# Patient Record
Sex: MALE | Race: WHITE | NOT HISPANIC OR LATINO | Employment: OTHER | URBAN - METROPOLITAN AREA
[De-identification: names, ages, dates, MRNs, and addresses within clinical notes are randomized per-mention and may not be internally consistent; named-entity substitution may affect disease eponyms.]

---

## 2017-01-13 RX ORDER — LIDOCAINE 50 MG/G
PATCH TOPICAL
Qty: 9 PATCH | Refills: 3 | Status: SHIPPED | OUTPATIENT
Start: 2017-01-13 | End: 2017-01-27 | Stop reason: SDUPTHER

## 2017-01-19 RX ORDER — TRIAMTERENE AND HYDROCHLOROTHIAZIDE 37.5; 25 MG/1; MG/1
TABLET ORAL
Qty: 30 TAB | Refills: 6 | Status: SHIPPED | OUTPATIENT
Start: 2017-01-19 | End: 2017-07-24 | Stop reason: SDUPTHER

## 2017-01-23 ENCOUNTER — TELEPHONE (OUTPATIENT)
Dept: MEDICAL GROUP | Facility: MEDICAL CENTER | Age: 62
End: 2017-01-23

## 2017-01-27 ENCOUNTER — OFFICE VISIT (OUTPATIENT)
Dept: MEDICAL GROUP | Facility: MEDICAL CENTER | Age: 62
End: 2017-01-27
Attending: FAMILY MEDICINE
Payer: MEDICAID

## 2017-01-27 VITALS
DIASTOLIC BLOOD PRESSURE: 80 MMHG | TEMPERATURE: 97.5 F | WEIGHT: 226 LBS | HEIGHT: 66 IN | BODY MASS INDEX: 36.32 KG/M2 | SYSTOLIC BLOOD PRESSURE: 130 MMHG | RESPIRATION RATE: 20 BRPM | HEART RATE: 88 BPM | OXYGEN SATURATION: 94 %

## 2017-01-27 DIAGNOSIS — M79.674 PAIN OF TOE OF RIGHT FOOT: ICD-10-CM

## 2017-01-27 DIAGNOSIS — M25.562 CHRONIC PAIN OF LEFT KNEE: ICD-10-CM

## 2017-01-27 DIAGNOSIS — N30.10 INTERSTITIAL CYSTITIS: ICD-10-CM

## 2017-01-27 DIAGNOSIS — I10 ESSENTIAL HYPERTENSION: ICD-10-CM

## 2017-01-27 DIAGNOSIS — G89.29 CHRONIC PAIN OF LEFT KNEE: ICD-10-CM

## 2017-01-27 PROCEDURE — 99212 OFFICE O/P EST SF 10 MIN: CPT | Performed by: FAMILY MEDICINE

## 2017-01-27 PROCEDURE — 99214 OFFICE O/P EST MOD 30 MIN: CPT | Performed by: FAMILY MEDICINE

## 2017-01-27 RX ORDER — HYDROCODONE BITARTRATE AND ACETAMINOPHEN 10; 325 MG/1; MG/1
1 TABLET ORAL EVERY 6 HOURS PRN
Qty: 120 TAB | Refills: 0 | Status: SHIPPED | OUTPATIENT
Start: 2017-01-27 | End: 2017-02-27 | Stop reason: SDUPTHER

## 2017-01-27 RX ORDER — LIDOCAINE 50 MG/G
PATCH TOPICAL
Qty: 30 PATCH | Refills: 3 | Status: SHIPPED | OUTPATIENT
Start: 2017-01-27 | End: 2018-12-28

## 2017-01-27 NOTE — PROGRESS NOTES
Subjective:      Allen Mccabe is a 61 y.o. male who presents with No chief complaint on file.            HPI 1. Interstitial cystitis-patient still continues to experience daily anterior low pelvic pain. He has been on Elmiron for about 7 months. He did consult with Dr. Orr who stated that if he takes Elmiron for a year without benefit it is likely not to work. Dr. Orr would like to do a updated cystoscopy which Allen is not consenting to because they tend to cause him significant pain for several days after the procedure and there is no active suspicion is he understands that of a new finding that would change therapy. Patient continues to take Norco 10 mg on average 4 times a day which is fairly effective. Intermittently he will add Dilaudid 4 mg 4 flareups of pain that often times are multiple doses just in several days and then the Dilaudid does not get added for days on end and he relies on the Norco alone. Not reporting current hematuria or any urinary incontinence.  2. Left knee pain-patient reports some injury to his left knee about 25 years ago which was improved with a intra-articular steroidal injection at that time. Reports over the last 1-1/2 years he's had intermittent sharp pains in certain positions in the area of his left kneecap. He is not reporting any locking, buckling, significant swelling.  3. Right toe pain-patient notes about a 5 year history of intermittent pain if he is reclining with his feet elevated and particular he pulls his right great toe back towards him. With standing or walking he is not having any significant pain. Has not noticed any localized redness or swelling in the area of the right great toe.  4. Hypertension-she states he didn't think his patient is compliant taking Dyazide 37.5/25, diltiazem 300 mg once daily, and terazosin 5 mg. Not reporting any chest pain or chest pressure. Reports home blood pressures typically running approximately 120s over 70s.    ROS  negative for nausea, diarrhea, rash, focal numbness   Social history-single, not working  Current medications-  Prior to Admission medications    Medication Sig Start Date End Date Taking? Authorizing Provider   hydrocodone/acetaminophen (NORCO)  MG Tab Take 1 Tab by mouth every 6 hours as needed. 1/27/17  Yes Michel Triana M.D.   lidocaine (LIDODERM) 5 % Patch APPLY 1 PATCH TO SKIN AS DIRECTED EVERY 24 HOURS. 1/27/17  Yes Michel Triana M.D.   triamterene-hctz (MAXZIDE-25/DYAZIDE) 37.5-25 MG Tab TAKE ONE TABLET BY MOUTH ONCE DAILY 1/19/17   Michel Triana M.D.   HYDROmorphone (DILAUDID) 4 MG Tab Take 1 Tab by mouth every 6 hours as needed. 12/29/16   Michel Triana M.D.   fluocinonide (LIDEX) 0.05 % Cream Apply 1 Application to affected area(s) 2 times a day. 12/5/16   Michel Triana M.D.   diazepam (VALIUM) 10 MG tablet 1.5 at HS PRN SLEEP & .5 in AM  for bladder spasms 11/29/16   Michel Triana M.D.   triamcinolone acetonide (KENALOG) 0.1 % Ointment Apply 1 Application to affected area(s) 2 times a day. 11/3/16   Michel Triana M.D.   Aug Betamethasone Dipropionate (DIPROLENE-AF) 0.05 % Cream Apply 1 g to affected area(s) 2 times a day. 10/25/16   Michel Triana M.D.   promethazine-codeine (PHENERGAN-CODEINE) 6.25-10 MG/5ML Syrup Take 5-10 mL by mouth 4 times a day as needed for Cough. 10/7/16   KENDALL Nair.P.RHEATH.   methocarbamol (ROBAXIN) 500 MG Tab Take 2 Tabs by mouth 3 times a day. 10/4/16   Michel Triana M.D.   betamethasone dipropionate (DIPROLENE) 0.05 % Ointment Apply thinly twice daily as needed 10/3/16   Michel Triana M.D.   pentosan (ELMIRON) 100 MG Cap Take 100 mg by mouth 3 times a day before meals.    Not In System Provider   naproxen (NAPROSYN) 500 MG Tab Take 1 Tab by mouth 2 times a day, with meals. 6/30/16   Michel Triana M.D.   cyclobenzaprine (FLEXERIL) 10 MG Tab Take 1 Tab by mouth 3 times a day as needed for Muscle Spasms. 6/29/16  "  Michel Triana M.D.   diltiazem CD (CARDIZEM CD) 300 MG CAPSULE SR 24 HR TAKE ONE CAPSULE BY MOUTH ONCE DAILY. 5/19/16   Michel Triana M.D.   terazosin (HYTRIN) 5 MG Cap Take 1 Cap by mouth every day. 4/21/16   Michel Triana M.D.   ranitidine (ZANTAC) 150 MG Tab Take 300 mg by mouth as needed for Heartburn.    Not In System Provider   diltiazem CD (CARTIA XT) 300 MG CAPSULE SR 24 HR Take 1 Cap by mouth every day. 9/25/15   Michel Triana M.D.   omeprazole (PRILOSEC) 20 MG delayed-release capsule Take 20 mg by mouth 2 times a day.    Not In System Provider          Objective:     /80 mmHg  Pulse 88  Temp(Src) 36.4 °C (97.5 °F)  Resp 20  Ht 1.676 m (5' 5.98\")  Wt 102.513 kg (226 lb)  BMI 36.49 kg/m2  SpO2 94%     Physical Exam      Gen.- alert, cooperative, in no acute distress  Neck- midline trachea, thyroid not enlarged or tender,supple, no cervical adenopathy  Chest-clear to auscultation and percussion with normal breath sounds. No retractions. Chest wall nontender  Cardiac- regular rhythm and rate. No murmur, thrill, or heave  Back-1+ tender from L5-S4 in the midline and the upper parasacral areas bilaterally.  Left knee-negative for effusion or warmth. No mediolateral instability. Anterior drawer is negative.  Right foot-intact light touch. No redness warmth or deformity. Patient expresses tenderness in the first MTP-DIP region of the great toe particular as that area is put into active extension.       Assessment/Plan:     1. Interstitial cystitis      2. Essential hypertension      3. Chronic pain of left knee      4. Pain of toe of right foot    Plan: 1. UDS today  2. Renew Norco 10 mg, #120 today  3. X-rays of the right toe and left knee  4. Patient could consider taking half doses of Robaxin 500 mg for more severe episodes of neck tightness    Plan: 1. Plain x-rays of the right great toe and left knee  2. Refill Norco 10 mg, #120  3. Revisit in 3 months or sooner if " needed  4. During acute flares of interstitial pain or back pain patient may continue to take his daily Norco along with supplemental doses of Dilaudid 4 mg.

## 2017-01-27 NOTE — MR AVS SNAPSHOT
"        Allen Mccabe   2017 8:30 AM   Office Visit   MRN: [id number]    [facility]   Dept Phone:  [phone]    Description:  Male : 1955   Provider:  Michel Triana M.D.           Reason for Visit     Hypertension           Allergies as of 2017     Allergen Noted Reactions    Nkda [No Known Drug Allergy] 10/04/2010       Zofran [Dextrose-Ondansetron] 2015   Rash    Diffuse rash      You were diagnosed with     Interstitial cystitis   [110961]       Essential hypertension   [8896852]       Chronic pain of left knee   [694612]       Pain of toe of right foot   [070331]         Vital Signs     Blood Pressure Pulse Temperature Respirations Height Weight    130/80 mmHg 88 36.4 °C (97.5 °F) 20 1.676 m (5' 5.98\") 102.513 kg (226 lb)    Body Mass Index Oxygen Saturation Smoking Status             36.49 kg/m2 94% Never Smoker          Basic Information     Date Of Birth Sex Race Ethnicity Preferred Language    1955 Male White Non- English      Problem List              ICD-10-CM Priority Class Noted - Resolved    Interstitial cystitis N30.10   Unknown - Present    Headache R51   Unknown - Present    Sprain of neck S13.9XXA   Unknown - Present    Chronic pain syndrome G89.4   6/15/2009 - Present    Hyperlipidemia E78.5   2010 - Present    Hypertension I10   2010 - Present    Obesity E66.9   2014 - Present    Skin lesion of back L98.9   2014 - Present    Recurrent cough R05   2015 - Present    Paresthesia of right leg R20.2   2015 - Present    Chronic pharyngitis J31.2   2015 - Present    Esophageal reflux K21.9   2015 - Present    Dysphagia R13.10   2015 - Present    Thoracic myofascial strain S29.019A   2015 - Present    Rash R21   2015 - Present    Drug dermatitis L27.0   2015 - Present    Rectal bleeding K62.5   2015 - Present    Accessory skin tags Q82.8   10/2/2015 - Present    Lumbar muscle " pain M54.5   10/27/2015 - Present    Multiple skin nodules R22.9   10/27/2015 - Present    Chronic lumbar pain M54.5, G89.29   12/24/2015 - Present    Dermatitis L30.9   12/24/2015 - Present    Neck pain of over 3 months duration M54.2, G89.29   1/21/2016 - Present    Skin lesion L98.9   2/12/2016 - Present    Obesity (BMI 30-39.9) E66.9   10/3/2016 - Present      Health Maintenance        Date Due Completion Dates    IMM ZOSTER VACCINE 3/7/2016 ---    IMM INFLUENZA (1) 9/1/2016 ---    COLONOSCOPY 1/5/2025 1/5/2015    IMM DTaP/Tdap/Td Vaccine (2 - Td) 2/17/2025 2/17/2015            Current Immunizations     Tdap Vaccine 2/17/2015      Below and/or attached are the medications your provider expects you to take. Review all of your home medications and newly ordered medications with your provider and/or pharmacist. Follow medication instructions as directed by your provider and/or pharmacist. Please keep your medication list with you and share with your provider. Update the information when medications are discontinued, doses are changed, or new medications (including over-the-counter products) are added; and carry medication information at all times in the event of emergency situations     Allergies:  NKDA - (reactions not documented)     ZOFRAN - Rash               Medications  Valid as of: January 27, 2017 - 10:23 AM    Generic Name Brand Name Tablet Size Instructions for use    Betamethasone Dipropionate (Ointment) DIPROLENE 0.05 % Apply thinly twice daily as needed        Betamethasone Dipropionate Aug (Cream) DIPROLENE-AF 0.05 % Apply 1 g to affected area(s) 2 times a day.        Cyclobenzaprine HCl (Tab) FLEXERIL 10 MG Take 1 Tab by mouth 3 times a day as needed for Muscle Spasms.        DiazePAM (Tab) VALIUM 10 MG 1.5 at HS PRN SLEEP & .5 in AM  for bladder spasms        DiltiaZEM HCl Coated Beads (CAPSULE SR 24 HR) CARDIZEM  MG Take 1 Cap by mouth every day.        DiltiaZEM HCl Coated Beads (CAPSULE SR  24 HR) CARDIZEM  MG TAKE ONE CAPSULE BY MOUTH ONCE DAILY.        Fluocinonide (Cream) LIDEX 0.05 % Apply 1 Application to affected area(s) 2 times a day.        Hydrocodone-Acetaminophen (Tab) NORCO  MG Take 1 Tab by mouth every 6 hours as needed.        HYDROmorphone HCl (Tab) DILAUDID 4 MG Take 1 Tab by mouth every 6 hours as needed.        Lidocaine (Patch) LIDODERM 5 % APPLY 1 PATCH TO SKIN AS DIRECTED EVERY 24 HOURS.        Methocarbamol (Tab) ROBAXIN 500 MG Take 2 Tabs by mouth 3 times a day.        Naproxen (Tab) NAPROSYN 500 MG Take 1 Tab by mouth 2 times a day, with meals.        Omeprazole (CAPSULE DELAYED RELEASE) PRILOSEC 20 MG Take 20 mg by mouth 2 times a day.        Pentosan Polysulfate Sodium (Cap) ELMIRON 100 MG Take 100 mg by mouth 3 times a day before meals.        Promethazine-Codeine (Syrup) PHENERGAN-CODEINE 6.25-10 MG/5ML Take 5-10 mL by mouth 4 times a day as needed for Cough.        RaNITidine HCl (Tab) ZANTAC 150 MG Take 300 mg by mouth as needed for Heartburn.        Terazosin HCl (Cap) HYTRIN 5 MG Take 1 Cap by mouth every day.        Triamcinolone Acetonide (Ointment) KENALOG 0.1 % Apply 1 Application to affected area(s) 2 times a day.        Triamterene-HCTZ (Tab) MAXZIDE-25/DYAZIDE 37.5-25 MG TAKE ONE TABLET BY MOUTH ONCE DAILY        .                 Medicines prescribed today were sent to:     [facility] - [address] [address]    Phone: [phone] Fax: [phone]    Open 24 Hours?: No      Medication refill instructions:       If your prescription bottle indicates you have medication refills left, it is not necessary to call your provider’s office. Please contact your pharmacy and they will refill your medication.    If your prescription bottle indicates you do not have any refills left, you may request refills at any time through one of the following ways: The online [employer] system (except Urgent Care), by calling your  provider’s office, or by asking your pharmacy to contact your provider’s office with a refill request. Medication refills are processed only during regular business hours and may not be available until the next business day. Your provider may request additional information or to have a follow-up visit with you prior to refilling your medication.   *Please Note: Medication refills are assigned a new Rx number when refilled electronically. Your pharmacy may indicate that no refills were authorized even though a new prescription for the same medication is available at the pharmacy. Please request the medicine by name with the pharmacy before contacting your provider for a refill.        Your To Do List     Future Labs/Procedures Complete By Expires    DX-KNEE COMPLETE 4+ LEFT  As directed 1/27/2018    DX-TOE(S) 2+ RIGHT  As directed 7/30/2017      Other Notes About Your Plan     Fall risk done 6/25/15           MyChart Access Code: Activation code not generated  Current [employer] Access Status: Active

## 2017-02-06 ENCOUNTER — TELEPHONE (OUTPATIENT)
Dept: MEDICAL GROUP | Facility: MEDICAL CENTER | Age: 62
End: 2017-02-06

## 2017-02-06 RX ORDER — BETAMETHASONE DIPROPIONATE 0.5 MG/G
1 OINTMENT, AUGMENTED TOPICAL 2 TIMES DAILY
Qty: 30 G | Refills: 3 | Status: SHIPPED | OUTPATIENT
Start: 2017-02-06 | End: 2018-12-28

## 2017-02-15 ENCOUNTER — HOSPITAL ENCOUNTER (OUTPATIENT)
Dept: RADIOLOGY | Facility: MEDICAL CENTER | Age: 62
End: 2017-02-15
Attending: FAMILY MEDICINE
Payer: MEDICAID

## 2017-02-15 DIAGNOSIS — M79.674 PAIN OF TOE OF RIGHT FOOT: ICD-10-CM

## 2017-02-15 DIAGNOSIS — M25.562 CHRONIC PAIN OF LEFT KNEE: ICD-10-CM

## 2017-02-15 DIAGNOSIS — G89.29 CHRONIC PAIN OF LEFT KNEE: ICD-10-CM

## 2017-02-15 PROCEDURE — 73660 X-RAY EXAM OF TOE(S): CPT | Mod: RT

## 2017-02-15 PROCEDURE — 73564 X-RAY EXAM KNEE 4 OR MORE: CPT | Mod: LT

## 2017-02-21 ENCOUNTER — TELEPHONE (OUTPATIENT)
Dept: MEDICAL GROUP | Facility: MEDICAL CENTER | Age: 62
End: 2017-02-21

## 2017-02-27 ENCOUNTER — OFFICE VISIT (OUTPATIENT)
Dept: MEDICAL GROUP | Facility: MEDICAL CENTER | Age: 62
End: 2017-02-27
Attending: FAMILY MEDICINE
Payer: MEDICAID

## 2017-02-27 VITALS
TEMPERATURE: 97.5 F | RESPIRATION RATE: 16 BRPM | SYSTOLIC BLOOD PRESSURE: 128 MMHG | BODY MASS INDEX: 36.64 KG/M2 | OXYGEN SATURATION: 93 % | WEIGHT: 228 LBS | HEIGHT: 66 IN | DIASTOLIC BLOOD PRESSURE: 70 MMHG | HEART RATE: 96 BPM

## 2017-02-27 DIAGNOSIS — N30.10 INTERSTITIAL CYSTITIS: ICD-10-CM

## 2017-02-27 DIAGNOSIS — M19.071 OSTEOARTHRITIS OF ANKLE OR FOOT, RIGHT: ICD-10-CM

## 2017-02-27 PROCEDURE — 99213 OFFICE O/P EST LOW 20 MIN: CPT | Performed by: FAMILY MEDICINE

## 2017-02-27 PROCEDURE — 99212 OFFICE O/P EST SF 10 MIN: CPT | Performed by: FAMILY MEDICINE

## 2017-02-27 RX ORDER — HYDROCODONE BITARTRATE AND ACETAMINOPHEN 10; 325 MG/1; MG/1
1 TABLET ORAL EVERY 6 HOURS PRN
Qty: 120 TAB | Refills: 0 | Status: SHIPPED | OUTPATIENT
Start: 2017-02-27 | End: 2017-03-27 | Stop reason: SDUPTHER

## 2017-02-27 RX ORDER — MELOXICAM 15 MG/1
15 TABLET ORAL DAILY
Qty: 30 TAB | Refills: 6 | Status: SHIPPED | OUTPATIENT
Start: 2017-02-27 | End: 2018-01-18

## 2017-02-27 ASSESSMENT — PAIN SCALES - GENERAL: PAINLEVEL: 5=MODERATE PAIN

## 2017-02-27 NOTE — PROGRESS NOTES
"Subjective:      Allen Mccabe is a 61 y.o. male who presents with Results and Toe Pain            HPI Right Great Toe Pain-patient reports ongoing daily level of pain at the base of the right great toe at the first MTP joint. This has been gradually becoming more consistent over the past 5 years. Patient did have a approximately 30-35 year history of recreational running along with athletic running track in high school including some sprinting. Patient notes no significant swelling or discoloration. Most severe pain is noted when patient is actually off of his feet and either flexes or extends his right great toe.    ROS negative for current black or bloody stools, focal numbness, ankle edema       Objective:     /70 mmHg  Pulse 96  Temp(Src) 36.4 °C (97.5 °F)  Resp 16  Ht 1.676 m (5' 5.98\")  Wt 103.42 kg (228 lb)  BMI 36.82 kg/m2  SpO2 93%     Physical Exam  Gen.-alert cooperative male in no acute distress  Right lower extremity-intact light touch. 1+ tender to palpation over the first MTP joint along with a moderate bunion developing at that site.  X-ray-notable for severe degenerative change with loss of joint space and subchondral sclerosis at the first MTP joint          Assessment/Plan:     1. Interstitial cystitis    2. Osteoarthritis right foot       Plan: 1. Trial of meloxicam 15 mg with dinner (suspected patient may be having a drowsiness side effect with previous use of naproxen)  2. Norco 30 day renewal done  "

## 2017-02-27 NOTE — MR AVS SNAPSHOT
"        Allen Mccabe   2017 8:30 AM   Office Visit   MRN: [id number]    [facility]   Dept Phone:  [phone]    Description:  Male : 1955   Provider:  Michel Triana M.D.           Reason for Visit     Results toe x ray results    Toe Pain           Allergies as of 2017     Allergen Noted Reactions    Nkda [No Known Drug Allergy] 10/04/2010       Zofran [Dextrose-Ondansetron] 2015   Rash    Diffuse rash      You were diagnosed with     Interstitial cystitis   [101590]       Osteoarthritis of ankle or foot, right   [735742]         Vital Signs     Blood Pressure Pulse Temperature Respirations Height Weight    128/70 mmHg 96 36.4 °C (97.5 °F) 16 1.676 m (5' 5.98\") 103.42 kg (228 lb)    Body Mass Index Oxygen Saturation Smoking Status             36.82 kg/m2 93% Never Smoker          Basic Information     Date Of Birth Sex Race Ethnicity Preferred Language    1955 Male White Non- English      Problem List              ICD-10-CM Priority Class Noted - Resolved    Interstitial cystitis N30.10   Unknown - Present    Headache R51   Unknown - Present    Sprain of neck S13.9XXA   Unknown - Present    Chronic pain syndrome G89.4   6/15/2009 - Present    Hyperlipidemia E78.5   2010 - Present    Hypertension I10   2010 - Present    Obesity E66.9   2014 - Present    Skin lesion of back L98.9   2014 - Present    Recurrent cough R05   2015 - Present    Paresthesia of right leg R20.2   2015 - Present    Chronic pharyngitis J31.2   2015 - Present    Esophageal reflux K21.9   2015 - Present    Dysphagia R13.10   2015 - Present    Thoracic myofascial strain S29.019A   2015 - Present    Rash R21   2015 - Present    Drug dermatitis L27.0   2015 - Present    Rectal bleeding K62.5   2015 - Present    Accessory skin tags Q82.8   10/2/2015 - Present    Lumbar muscle pain M54.5   10/27/2015 - Present    Multiple skin " nodules R22.9   10/27/2015 - Present    Chronic lumbar pain M54.5, G89.29   12/24/2015 - Present    Dermatitis L30.9   12/24/2015 - Present    Neck pain of over 3 months duration M54.2, G89.29   1/21/2016 - Present    Skin lesion L98.9   2/12/2016 - Present    Obesity (BMI 30-39.9) E66.9   10/3/2016 - Present      Health Maintenance        Date Due Completion Dates    IMM ZOSTER VACCINE 3/7/2016 ---    IMM INFLUENZA (1) 9/1/2016 ---    COLONOSCOPY 1/5/2025 1/5/2015    IMM DTaP/Tdap/Td Vaccine (2 - Td) 2/17/2025 2/17/2015            Current Immunizations     Tdap Vaccine 2/17/2015      Below and/or attached are the medications your provider expects you to take. Review all of your home medications and newly ordered medications with your provider and/or pharmacist. Follow medication instructions as directed by your provider and/or pharmacist. Please keep your medication list with you and share with your provider. Update the information when medications are discontinued, doses are changed, or new medications (including over-the-counter products) are added; and carry medication information at all times in the event of emergency situations     Allergies:  NKDA - (reactions not documented)     ZOFRAN - Rash               Medications  Valid as of: February 27, 2017 -  9:20 AM    Generic Name Brand Name Tablet Size Instructions for use    Betamethasone Dipropionate (Ointment) DIPROLENE 0.05 % Apply thinly twice daily as needed        Betamethasone Dipropionate Aug (Cream) DIPROLENE-AF 0.05 % Apply 1 g to affected area(s) 2 times a day.        Betamethasone Dipropionate Aug (Ointment) DIPROLENE-AF 0.05 % Apply 1 Application to affected area(s) 2 times a day.        Cyclobenzaprine HCl (Tab) FLEXERIL 10 MG Take 1 Tab by mouth 3 times a day as needed for Muscle Spasms.        DiazePAM (Tab) VALIUM 10 MG 1.5 at HS PRN SLEEP & .5 in AM  for bladder spasms        DiltiaZEM HCl Coated Beads (CAPSULE SR 24 HR) CARDIZEM  MG  Take 1 Cap by mouth every day.        DiltiaZEM HCl Coated Beads (CAPSULE SR 24 HR) CARDIZEM  MG TAKE ONE CAPSULE BY MOUTH ONCE DAILY.        Fluocinonide (Cream) LIDEX 0.05 % Apply 1 Application to affected area(s) 2 times a day.        Hydrocodone-Acetaminophen (Tab) NORCO  MG Take 1 Tab by mouth every 6 hours as needed.        HYDROmorphone HCl (Tab) DILAUDID 4 MG Take 1 Tab by mouth every 6 hours as needed.        Lidocaine (Patch) LIDODERM 5 % APPLY 1 PATCH TO SKIN AS DIRECTED EVERY 24 HOURS.        Meloxicam (Tab) MOBIC 15 MG Take 1 Tab by mouth every day.        Methocarbamol (Tab) ROBAXIN 500 MG Take 2 Tabs by mouth 3 times a day.        Naproxen (Tab) NAPROSYN 500 MG Take 1 Tab by mouth 2 times a day, with meals.        Omeprazole (CAPSULE DELAYED RELEASE) PRILOSEC 20 MG Take 20 mg by mouth 2 times a day.        Pentosan Polysulfate Sodium (Cap) ELMIRON 100 MG Take 100 mg by mouth 3 times a day before meals.        Promethazine-Codeine (Syrup) PHENERGAN-CODEINE 6.25-10 MG/5ML Take 5-10 mL by mouth 4 times a day as needed for Cough.        RaNITidine HCl (Tab) ZANTAC 150 MG Take 300 mg by mouth as needed for Heartburn.        Terazosin HCl (Cap) HYTRIN 5 MG Take 1 Cap by mouth every day.        Triamcinolone Acetonide (Ointment) KENALOG 0.1 % Apply 1 Application to affected area(s) 2 times a day.        Triamterene-HCTZ (Tab) MAXZIDE-25/DYAZIDE 37.5-25 MG TAKE ONE TABLET BY MOUTH ONCE DAILY        .                 Medicines prescribed today were sent to:     [facility] - [address] [address]    Phone: [phone] Fax: [phone]    Open 24 Hours?: No      Medication refill instructions:       If your prescription bottle indicates you have medication refills left, it is not necessary to call your provider’s office. Please contact your pharmacy and they will refill your medication.    If your prescription bottle indicates you do not have any refills  left, you may request refills at any time through one of the following ways: The online [employer] system (except Urgent Care), by calling your provider’s office, or by asking your pharmacy to contact your provider’s office with a refill request. Medication refills are processed only during regular business hours and may not be available until the next business day. Your provider may request additional information or to have a follow-up visit with you prior to refilling your medication.   *Please Note: Medication refills are assigned a new Rx number when refilled electronically. Your pharmacy may indicate that no refills were authorized even though a new prescription for the same medication is available at the pharmacy. Please request the medicine by name with the pharmacy before contacting your provider for a refill.        Other Notes About Your Plan     Fall risk done 6/25/15           [employer] Access Code: Activation code not generated  Current [employer] Status: Active

## 2017-03-23 ENCOUNTER — PATIENT MESSAGE (OUTPATIENT)
Dept: MEDICAL GROUP | Facility: MEDICAL CENTER | Age: 62
End: 2017-03-23

## 2017-03-27 DIAGNOSIS — L30.9 DERMATITIS: ICD-10-CM

## 2017-03-27 DIAGNOSIS — N30.10 INTERSTITIAL CYSTITIS: ICD-10-CM

## 2017-03-27 RX ORDER — HYDROCODONE BITARTRATE AND ACETAMINOPHEN 10; 325 MG/1; MG/1
1 TABLET ORAL EVERY 6 HOURS PRN
Qty: 120 TAB | Refills: 0 | Status: SHIPPED | OUTPATIENT
Start: 2017-03-27 | End: 2017-04-27 | Stop reason: SDUPTHER

## 2017-03-27 RX ORDER — BETAMETHASONE DIPROPIONATE 0.05 %
OINTMENT (GRAM) TOPICAL
Qty: 30 G | Refills: 1 | Status: SHIPPED | OUTPATIENT
Start: 2017-03-27 | End: 2017-08-04 | Stop reason: SDUPTHER

## 2017-03-31 DIAGNOSIS — S29.019A ACUTE THORACIC MYOFASCIAL STRAIN, INITIAL ENCOUNTER: ICD-10-CM

## 2017-04-03 RX ORDER — METHOCARBAMOL 500 MG/1
1000 TABLET, FILM COATED ORAL 3 TIMES DAILY
Qty: 120 TAB | Refills: 11 | Status: SHIPPED | OUTPATIENT
Start: 2017-04-03 | End: 2018-12-28

## 2017-04-06 DIAGNOSIS — R10.2 PELVIC PAIN: ICD-10-CM

## 2017-04-06 RX ORDER — HYDROMORPHONE HYDROCHLORIDE 4 MG/1
4 TABLET ORAL EVERY 6 HOURS PRN
Qty: 45 TAB | Refills: 0 | Status: CANCELLED | OUTPATIENT
Start: 2017-04-06

## 2017-04-07 DIAGNOSIS — R10.2 PELVIC PAIN: ICD-10-CM

## 2017-04-07 RX ORDER — HYDROMORPHONE HYDROCHLORIDE 4 MG/1
4 TABLET ORAL EVERY 6 HOURS PRN
Qty: 45 TAB | Refills: 0 | Status: CANCELLED | OUTPATIENT
Start: 2017-04-07

## 2017-04-07 RX ORDER — HYDROMORPHONE HYDROCHLORIDE 4 MG/1
4 TABLET ORAL EVERY 6 HOURS PRN
Qty: 45 TAB | Refills: 0 | Status: SHIPPED | OUTPATIENT
Start: 2017-04-07 | End: 2017-07-24 | Stop reason: SDUPTHER

## 2017-04-10 ENCOUNTER — OFFICE VISIT (OUTPATIENT)
Dept: MEDICAL GROUP | Facility: MEDICAL CENTER | Age: 62
End: 2017-04-10
Attending: FAMILY MEDICINE
Payer: MEDICAID

## 2017-04-10 VITALS
RESPIRATION RATE: 16 BRPM | WEIGHT: 230 LBS | TEMPERATURE: 97.9 F | HEIGHT: 66 IN | HEART RATE: 88 BPM | OXYGEN SATURATION: 94 % | BODY MASS INDEX: 36.96 KG/M2 | SYSTOLIC BLOOD PRESSURE: 122 MMHG | DIASTOLIC BLOOD PRESSURE: 76 MMHG

## 2017-04-10 DIAGNOSIS — J11.1 FLU: ICD-10-CM

## 2017-04-10 DIAGNOSIS — E66.9 OBESITY (BMI 30-39.9): ICD-10-CM

## 2017-04-10 PROCEDURE — 99212 OFFICE O/P EST SF 10 MIN: CPT | Performed by: FAMILY MEDICINE

## 2017-04-10 PROCEDURE — 99213 OFFICE O/P EST LOW 20 MIN: CPT | Performed by: FAMILY MEDICINE

## 2017-04-10 RX ORDER — PROMETHAZINE HYDROCHLORIDE AND CODEINE PHOSPHATE 6.25; 1 MG/5ML; MG/5ML
5-10 SYRUP ORAL 4 TIMES DAILY PRN
Qty: 240 ML | Refills: 0 | Status: SHIPPED | OUTPATIENT
Start: 2017-04-10 | End: 2017-04-17 | Stop reason: SDUPTHER

## 2017-04-10 ASSESSMENT — PAIN SCALES - GENERAL: PAINLEVEL: 5=MODERATE PAIN

## 2017-04-10 NOTE — MR AVS SNAPSHOT
"        Allen Mccabe   4/10/2017 2:30 PM   Office Visit   MRN: [id number]    [facility]   Dept Phone:  [phone]    Description:  Male : 1955   Provider:  Michel Triana M.D.           Reason for Visit     Influenza           Allergies as of 4/10/2017     Allergen Noted Reactions    Nkda [No Known Drug Allergy] 10/04/2010       Zofran [Dextrose-Ondansetron] 2015   Rash    Diffuse rash      You were diagnosed with     Flu   [599250]       Obesity (BMI 30-39.9)   [463491]         Vital Signs     Blood Pressure Pulse Temperature Respirations Height Weight    122/76 mmHg 88 36.6 °C (97.9 °F) 16 1.676 m (5' 5.98\") 104.327 kg (230 lb)    Body Mass Index Oxygen Saturation Smoking Status             37.14 kg/m2 94% Never Smoker          Basic Information     Date Of Birth Sex Race Ethnicity Preferred Language    1955 Male White Non- English      Your appointments     2017  8:30 AM   Established Patient with Michel Triana M.D.   [facility] ([facility])    [address] [place] [id number]   [phone]           You will be receiving a confirmation call a few days before your appointment from our automated call confirmation system.              Problem List              ICD-10-CM Priority Class Noted - Resolved    Interstitial cystitis N30.10   Unknown - Present    Headache R51   Unknown - Present    Sprain of neck S13.9XXA   Unknown - Present    Chronic pain syndrome G89.4   6/15/2009 - Present    Hyperlipidemia E78.5   2010 - Present    Hypertension I10   2010 - Present    Obesity E66.9   2014 - Present    Skin lesion of back L98.9   2014 - Present    Recurrent cough R05   2015 - Present    Paresthesia of right leg R20.2   2015 - Present    Chronic pharyngitis J31.2   2015 - Present    Esophageal reflux K21.9   2015 - Present    Dysphagia R13.10   2015 - Present    Thoracic myofascial strain " S29.019A   4/28/2015 - Present    Rash R21   5/12/2015 - Present    Drug dermatitis L27.0   5/18/2015 - Present    Rectal bleeding K62.5   6/25/2015 - Present    Accessory skin tags Q82.8   10/2/2015 - Present    Lumbar muscle pain M54.5   10/27/2015 - Present    Multiple skin nodules R22.9   10/27/2015 - Present    Chronic lumbar pain M54.5, G89.29   12/24/2015 - Present    Dermatitis L30.9   12/24/2015 - Present    Neck pain of over 3 months duration M54.2, G89.29   1/21/2016 - Present    Skin lesion L98.9   2/12/2016 - Present    Obesity (BMI 30-39.9) E66.9   10/3/2016 - Present      Health Maintenance        Date Due Completion Dates    IMM ZOSTER VACCINE 3/7/2016 ---    COLONOSCOPY 1/5/2025 1/5/2015    IMM DTaP/Tdap/Td Vaccine (2 - Td) 2/17/2025 2/17/2015            Current Immunizations     Tdap Vaccine 2/17/2015      Below and/or attached are the medications your provider expects you to take. Review all of your home medications and newly ordered medications with your provider and/or pharmacist. Follow medication instructions as directed by your provider and/or pharmacist. Please keep your medication list with you and share with your provider. Update the information when medications are discontinued, doses are changed, or new medications (including over-the-counter products) are added; and carry medication information at all times in the event of emergency situations     Allergies:  NKDA - (reactions not documented)     ZOFRAN - Rash               Medications  Valid as of: April 10, 2017 -  3:15 PM    Generic Name Brand Name Tablet Size Instructions for use    Betamethasone Dipropionate (Ointment) DIPROLENE 0.05 % Apply thinly twice daily as needed        Betamethasone Dipropionate Aug (Cream) DIPROLENE-AF 0.05 % Apply 1 g to affected area(s) 2 times a day.        Betamethasone Dipropionate Aug (Ointment) DIPROLENE-AF 0.05 % Apply 1 Application to affected area(s) 2 times a day.        Clobetasol Propionate  (Ointment) TEMOVATE 0.05 % Apply 1 Application to affected area(s) 2 times a day.        Cyclobenzaprine HCl (Tab) FLEXERIL 10 MG Take 1 Tab by mouth 3 times a day as needed for Muscle Spasms.        DiazePAM (Tab) VALIUM 10 MG 1.5 at HS PRN SLEEP & .5 in AM  for bladder spasms        DiltiaZEM HCl Coated Beads (CAPSULE SR 24 HR) CARDIZEM  MG Take 1 Cap by mouth every day.        DiltiaZEM HCl Coated Beads (CAPSULE SR 24 HR) CARDIZEM  MG TAKE ONE CAPSULE BY MOUTH ONCE DAILY.        Fluocinonide (Cream) LIDEX 0.05 % Apply 1 Application to affected area(s) 2 times a day.        Hydrocodone-Acetaminophen (Tab) NORCO  MG Take 1 Tab by mouth every 6 hours as needed.        HYDROmorphone HCl (Tab) DILAUDID 4 MG Take 1 Tab by mouth every 6 hours as needed.        Lidocaine (Patch) LIDODERM 5 % APPLY 1 PATCH TO SKIN AS DIRECTED EVERY 24 HOURS.        Meloxicam (Tab) MOBIC 15 MG Take 1 Tab by mouth every day.        Methocarbamol (Tab) ROBAXIN 500 MG Take 2 Tabs by mouth 3 times a day.        Naproxen (Tab) NAPROSYN 500 MG Take 1 Tab by mouth 2 times a day, with meals.        Omeprazole (CAPSULE DELAYED RELEASE) PRILOSEC 20 MG Take 20 mg by mouth 2 times a day.        Pentosan Polysulfate Sodium (Cap) ELMIRON 100 MG Take 100 mg by mouth 3 times a day before meals.        Promethazine-Codeine (Syrup) PHENERGAN-CODEINE 6.25-10 MG/5ML Take 5-10 mL by mouth 4 times a day as needed for Cough.        RaNITidine HCl (Tab) ZANTAC 150 MG Take 300 mg by mouth as needed for Heartburn.        Terazosin HCl (Cap) HYTRIN 5 MG Take 1 Cap by mouth every day.        Triamcinolone Acetonide (Ointment) KENALOG 0.1 % Apply 1 Application to affected area(s) 2 times a day.        Triamterene-HCTZ (Tab) MAXZIDE-25/DYAZIDE 37.5-25 MG TAKE ONE TABLET BY MOUTH ONCE DAILY        .                 Medicines prescribed today were sent to:     [facility] - [place], [place] [address] [address]    Phone:  [phone] Fax: [phone]    Open 24 Hours?: No      Medication refill instructions:       If your prescription bottle indicates you have medication refills left, it is not necessary to call your provider’s office. Please contact your pharmacy and they will refill your medication.    If your prescription bottle indicates you do not have any refills left, you may request refills at any time through one of the following ways: The online "[employer] system (except Urgent Care), by calling your provider’s office, or by asking your pharmacy to contact your provider’s office with a refill request. Medication refills are processed only during regular business hours and may not be available until the next business day. Your provider may request additional information or to have a follow-up visit with you prior to refilling your medication.   *Please Note: Medication refills are assigned a new Rx number when refilled electronically. Your pharmacy may indicate that no refills were authorized even though a new prescription for the same medication is available at the pharmacy. Please request the medicine by name with the pharmacy before contacting your provider for a refill.        Other Notes About Your Plan     Fall risk done 6/25/15           "[employer] Access Code: Activation code not generated  Current "[employer] Status: Active

## 2017-04-10 NOTE — PROGRESS NOTES
"Subjective:      Allen Mccabe is a 62 y.o. male who presents with No chief complaint on file.            HPI 1. Flu illness-patient reports onset 5 days ago of initial lightheadedness with diffuse joint aches and bone aches. Denies much in the way of sore muscles. Over the past 3 days has developed a deep uncomfortable but mostly nonproductive cough. Felt quite warm to nights ago but was unable to measure his temperature      ROS negative for diarrhea, diaphoresis, rash, dyspnea       Objective:     /76 mmHg  Pulse 88  Temp(Src) 36.6 °C (97.9 °F)  Resp 16  Ht 1.676 m (5' 5.98\")  Wt 104.327 kg (230 lb)  BMI 37.14 kg/m2  SpO2 94%     Physical Exam  General- alert,cooperative patient in no acute distress  Ears- normal tms without redness, perforation. Canals unremarkable  Nares- clear, pink, moist mucosa without bleeding. No purulent nasal DC  Orophx.- lips normal. Clear, pink, moist mucosa without redness or exudate. Tongue is midline  Chest-Normal to auscultation and percussion, movement is symmetric. Nontender to palpation.   Skin-Skin is clear without rash, edema, redness, or cyanosis.              Assessment/Plan:     1. Flu      2. Obesity (BMI 30-39.9)    - Patient identified as having weight management issue.  Appropriate orders and counseling given.     Plan: 1.Patient was counseled today regarding importance of appropriate food selection, limiting food portion size, and were appropriate increasing weekly exercise efforts in order to foster healthy gradual weight loss. Potential health risks risks of current obesity reviewed.  2. Rx Phenergan with codeine, 240 mL's to suppress cough  3. Continue generous fluid intake, meloxicam  4. Revisit if not improving in 5 days      "

## 2017-04-17 RX ORDER — PROMETHAZINE HYDROCHLORIDE AND CODEINE PHOSPHATE 6.25; 1 MG/5ML; MG/5ML
5-10 SYRUP ORAL 4 TIMES DAILY PRN
Qty: 240 ML | Refills: 0 | Status: SHIPPED | OUTPATIENT
Start: 2017-04-17 | End: 2018-12-28

## 2017-04-27 ENCOUNTER — OFFICE VISIT (OUTPATIENT)
Dept: MEDICAL GROUP | Facility: MEDICAL CENTER | Age: 62
End: 2017-04-27
Attending: FAMILY MEDICINE
Payer: MEDICAID

## 2017-04-27 VITALS
SYSTOLIC BLOOD PRESSURE: 122 MMHG | HEART RATE: 92 BPM | DIASTOLIC BLOOD PRESSURE: 70 MMHG | HEIGHT: 66 IN | TEMPERATURE: 97.9 F | BODY MASS INDEX: 37.45 KG/M2 | RESPIRATION RATE: 16 BRPM | OXYGEN SATURATION: 97 % | WEIGHT: 233 LBS

## 2017-04-27 DIAGNOSIS — N30.10 INTERSTITIAL CYSTITIS: ICD-10-CM

## 2017-04-27 DIAGNOSIS — E66.9 OBESITY (BMI 30-39.9): ICD-10-CM

## 2017-04-27 DIAGNOSIS — M79.18 LUMBAR MUSCLE PAIN: ICD-10-CM

## 2017-04-27 PROCEDURE — 99213 OFFICE O/P EST LOW 20 MIN: CPT | Performed by: FAMILY MEDICINE

## 2017-04-27 RX ORDER — HYDROCODONE BITARTRATE AND ACETAMINOPHEN 10; 325 MG/1; MG/1
1 TABLET ORAL EVERY 6 HOURS PRN
Qty: 120 TAB | Refills: 0 | Status: SHIPPED | OUTPATIENT
Start: 2017-04-27 | End: 2017-05-24 | Stop reason: SDUPTHER

## 2017-04-27 ASSESSMENT — PAIN SCALES - GENERAL: PAINLEVEL: 5=MODERATE PAIN

## 2017-04-27 NOTE — MR AVS SNAPSHOT
"        Allen Mccabe   2017 8:30 AM   Office Visit   MRN: [id number]    [facility]   Dept Phone:  [phone]    Description:  Male : 1955   Provider:  Michel Triana M.D.           Reason for Visit     Hypertension           Allergies as of 2017     Allergen Noted Reactions    Nkda [No Known Drug Allergy] 10/04/2010       Zofran [Dextrose-Ondansetron] 2015   Rash    Diffuse rash      You were diagnosed with     Obesity (BMI 30-39.9)   [695311]       Interstitial cystitis   [553175]         Vital Signs     Blood Pressure Pulse Temperature Respirations Height Weight    122/70 mmHg 92 36.6 °C (97.9 °F) 16 1.676 m (5' 5.98\") 105.688 kg (233 lb)    Body Mass Index Oxygen Saturation Smoking Status             37.63 kg/m2 97% Never Smoker          Basic Information     Date Of Birth Sex Race Ethnicity Preferred Language    1955 Male White Non- English      Problem List              ICD-10-CM Priority Class Noted - Resolved    Interstitial cystitis N30.10   Unknown - Present    Headache R51   Unknown - Present    Sprain of neck S13.9XXA   Unknown - Present    Chronic pain syndrome G89.4   6/15/2009 - Present    Hyperlipidemia E78.5   2010 - Present    Hypertension I10   2010 - Present    Obesity E66.9   2014 - Present    Skin lesion of back L98.9   2014 - Present    Recurrent cough R05   2015 - Present    Paresthesia of right leg R20.2   2015 - Present    Chronic pharyngitis J31.2   2015 - Present    Esophageal reflux K21.9   2015 - Present    Dysphagia R13.10   2015 - Present    Thoracic myofascial strain S29.019A   2015 - Present    Rash R21   2015 - Present    Drug dermatitis L27.0   2015 - Present    Rectal bleeding K62.5   2015 - Present    Accessory skin tags Q82.8   10/2/2015 - Present    Lumbar muscle pain M54.5   10/27/2015 - Present    Multiple skin nodules R22.9   10/27/2015 - Present   " Chronic lumbar pain M54.5, G89.29   12/24/2015 - Present    Dermatitis L30.9   12/24/2015 - Present    Neck pain of over 3 months duration M54.2, G89.29   1/21/2016 - Present    Skin lesion L98.9   2/12/2016 - Present    Obesity (BMI 30-39.9) E66.9   10/3/2016 - Present      Health Maintenance        Date Due Completion Dates    IMM ZOSTER VACCINE 3/7/2016 ---    COLONOSCOPY 1/5/2025 1/5/2015    IMM DTaP/Tdap/Td Vaccine (2 - Td) 2/17/2025 2/17/2015            Current Immunizations     Tdap Vaccine 2/17/2015      Below and/or attached are the medications your provider expects you to take. Review all of your home medications and newly ordered medications with your provider and/or pharmacist. Follow medication instructions as directed by your provider and/or pharmacist. Please keep your medication list with you and share with your provider. Update the information when medications are discontinued, doses are changed, or new medications (including over-the-counter products) are added; and carry medication information at all times in the event of emergency situations     Allergies:  NKDA - (reactions not documented)     ZOFRAN - Rash               Medications  Valid as of: April 27, 2017 -  1:16 PM    Generic Name Brand Name Tablet Size Instructions for use    Betamethasone Dipropionate (Ointment) DIPROLENE 0.05 % Apply thinly twice daily as needed        Betamethasone Dipropionate Aug (Cream) DIPROLENE-AF 0.05 % Apply 1 g to affected area(s) 2 times a day.        Betamethasone Dipropionate Aug (Ointment) DIPROLENE-AF 0.05 % Apply 1 Application to affected area(s) 2 times a day.        Clobetasol Propionate (Ointment) TEMOVATE 0.05 % Apply 1 Application to affected area(s) 2 times a day.        Cyclobenzaprine HCl (Tab) FLEXERIL 10 MG Take 1 Tab by mouth 3 times a day as needed for Muscle Spasms.        DiazePAM (Tab) VALIUM 10 MG 1.5 at HS PRN SLEEP & .5 in AM  for bladder spasms        DilTIAZem HCl Coated Beads  (CAPSULE SR 24 HR) CARDIZEM  MG Take 1 Cap by mouth every day.        DilTIAZem HCl Coated Beads (CAPSULE SR 24 HR) CARDIZEM  MG TAKE ONE CAPSULE BY MOUTH ONCE DAILY.        Fluocinonide (Cream) LIDEX 0.05 % Apply 1 Application to affected area(s) 2 times a day.        Hydrocodone-Acetaminophen (Tab) NORCO  MG Take 1 Tab by mouth every 6 hours as needed.        HYDROmorphone HCl (Tab) DILAUDID 4 MG Take 1 Tab by mouth every 6 hours as needed.        Lidocaine (Patch) LIDODERM 5 % APPLY 1 PATCH TO SKIN AS DIRECTED EVERY 24 HOURS.        Meloxicam (Tab) MOBIC 15 MG Take 1 Tab by mouth every day.        Methocarbamol (Tab) ROBAXIN 500 MG Take 2 Tabs by mouth 3 times a day.        Naproxen (Tab) NAPROSYN 500 MG Take 1 Tab by mouth 2 times a day, with meals.        Omeprazole (CAPSULE DELAYED RELEASE) PRILOSEC 20 MG Take 20 mg by mouth 2 times a day.        Pentosan Polysulfate Sodium (Cap) ELMIRON 100 MG Take 100 mg by mouth 3 times a day before meals.        Promethazine-Codeine (Syrup) PHENERGAN-CODEINE 6.25-10 MG/5ML Take 5-10 mL by mouth 4 times a day as needed for Cough.        RaNITidine HCl (Tab) ZANTAC 150 MG Take 300 mg by mouth as needed for Heartburn.        Terazosin HCl (Cap) HYTRIN 5 MG TAKE ONE CAPSULE BY MOUTH ONCE DAILY        Triamcinolone Acetonide (Ointment) KENALOG 0.1 % Apply 1 Application to affected area(s) 2 times a day.        Triamterene-HCTZ (Tab) MAXZIDE-25/DYAZIDE 37.5-25 MG TAKE ONE TABLET BY MOUTH ONCE DAILY        .                 Medicines prescribed today were sent to:     [facility] - [place] [address] [address]    Phone: [phone] Fax: [phone]    Open 24 Hours?: No      Medication refill instructions:       If your prescription bottle indicates you have medication refills left, it is not necessary to call your provider’s office. Please contact your pharmacy and they will refill your medication.    If your prescription  bottle indicates you do not have any refills left, you may request refills at any time through one of the following ways: The online [employer] system (except Urgent Care), by calling your provider’s office, or by asking your pharmacy to contact your provider’s office with a refill request. Medication refills are processed only during regular business hours and may not be available until the next business day. Your provider may request additional information or to have a follow-up visit with you prior to refilling your medication.   *Please Note: Medication refills are assigned a new Rx number when refilled electronically. Your pharmacy may indicate that no refills were authorized even though a new prescription for the same medication is available at the pharmacy. Please request the medicine by name with the pharmacy before contacting your provider for a refill.        Your To Do List     Future Labs/Procedures Complete By Expires    FREE THYROXINE  As directed 4/27/2018    TSH  As directed 4/28/2018      Other Notes About Your Plan     Fall risk done 6/25/15           [employer] Access Code: Activation code not generated  Current [employer] Status: Active

## 2017-04-28 NOTE — PROGRESS NOTES
Chief Complaint   Patient presents with   • Hypertension       HISTORY OF PRESENT ILLNESS: Patient is a 62 y.o. male established patient who presents today to follow-up on  chronic pain due to interstitial cystitis, recurrent low back pain        Interstitial cystitis  Patient expresses ongoing pelvic pain at stable levels over the past 3 months. No recent new urologic investigation. He is using Norco 10 mg typically 4 times per day with rare supplementation of Dilaudid 4 mg for severe peaks in pain. Denies any hematuria, urinary incontinence.    Lumbar muscle pain  Patient expresses bilateral paralumbar pain oftentimes when he awakens in the morning. After several voidings that pain subsides. Denies any pain radiating down into either leg, leg numbness, focal leg weakness.      Patient Active Problem List    Diagnosis Date Noted   • Obesity (BMI 30-39.9) 10/03/2016   • Skin lesion 02/12/2016   • Neck pain of over 3 months duration 01/21/2016   • Chronic lumbar pain 12/24/2015   • Dermatitis 12/24/2015   • Lumbar muscle pain 10/27/2015   • Multiple skin nodules 10/27/2015   • Accessory skin tags 10/02/2015   • Rectal bleeding 06/25/2015   • Drug dermatitis 05/18/2015   • Rash 05/12/2015   • Thoracic myofascial strain 04/28/2015   • Chronic pharyngitis 04/01/2015   • Esophageal reflux 04/01/2015   • Dysphagia 04/01/2015   • Recurrent cough 02/06/2015   • Paresthesia of right leg 02/06/2015   • Obesity 12/29/2014   • Skin lesion of back 12/29/2014   • Hypertension 06/29/2010   • Hyperlipidemia 06/11/2010   • Chronic pain syndrome 06/15/2009   • Interstitial cystitis    • Headache    • Sprain of neck        Allergies:Nkda and Zofran    Current Outpatient Prescriptions   Medication Sig Dispense Refill   • hydrocodone/acetaminophen (NORCO)  MG Tab Take 1 Tab by mouth every 6 hours as needed. 120 Tab 0   • terazosin (HYTRIN) 5 MG Cap TAKE ONE CAPSULE BY MOUTH ONCE DAILY 30 Cap 11   • promethazine-codeine  (PHENERGAN-CODEINE) 6.25-10 MG/5ML Syrup Take 5-10 mL by mouth 4 times a day as needed for Cough. 240 mL 0   • HYDROmorphone (DILAUDID) 4 MG Tab Take 1 Tab by mouth every 6 hours as needed. 45 Tab 0   • clobetasol (TEMOVATE) 0.05 % Ointment Apply 1 Application to affected area(s) 2 times a day. 60 g 0   • methocarbamol (ROBAXIN) 500 MG Tab Take 2 Tabs by mouth 3 times a day. 120 Tab 11   • betamethasone dipropionate (DIPROLENE) 0.05 % Ointment Apply thinly twice daily as needed 30 g 1   • meloxicam (MOBIC) 15 MG tablet Take 1 Tab by mouth every day. 30 Tab 6   • augmented betamethasone dipropionate (DIPROLENE-AF) 0.05 % ointment Apply 1 Application to affected area(s) 2 times a day. 30 g 3   • lidocaine (LIDODERM) 5 % Patch APPLY 1 PATCH TO SKIN AS DIRECTED EVERY 24 HOURS. 30 Patch 3   • triamterene-hctz (MAXZIDE-25/DYAZIDE) 37.5-25 MG Tab TAKE ONE TABLET BY MOUTH ONCE DAILY 30 Tab 6   • fluocinonide (LIDEX) 0.05 % Cream Apply 1 Application to affected area(s) 2 times a day. 45 g 1   • diazepam (VALIUM) 10 MG tablet 1.5 at HS PRN SLEEP & .5 in AM  for bladder spasms 60 Tab 0   • triamcinolone acetonide (KENALOG) 0.1 % Ointment Apply 1 Application to affected area(s) 2 times a day. 60 g 1   • Aug Betamethasone Dipropionate (DIPROLENE-AF) 0.05 % Cream Apply 1 g to affected area(s) 2 times a day. 60 g 1   • pentosan (ELMIRON) 100 MG Cap Take 100 mg by mouth 3 times a day before meals.     • naproxen (NAPROSYN) 500 MG Tab Take 1 Tab by mouth 2 times a day, with meals. 60 Tab 3   • cyclobenzaprine (FLEXERIL) 10 MG Tab Take 1 Tab by mouth 3 times a day as needed for Muscle Spasms. 60 Tab 4   • diltiazem CD (CARDIZEM CD) 300 MG CAPSULE SR 24 HR TAKE ONE CAPSULE BY MOUTH ONCE DAILY. 30 Cap 11   • ranitidine (ZANTAC) 150 MG Tab Take 300 mg by mouth as needed for Heartburn.     • diltiazem CD (CARTIA XT) 300 MG CAPSULE SR 24 HR Take 1 Cap by mouth every day. 30 Cap 6   • omeprazole (PRILOSEC) 20 MG delayed-release capsule  "Take 20 mg by mouth 2 times a day.       No current facility-administered medications for this visit.    Social History-single, not working    Social History   Substance Use Topics   • Smoking status: Never Smoker    • Smokeless tobacco: Never Used   • Alcohol Use: 0.0 oz/week     0 Standard drinks or equivalent per week      Comment: rare       Family History   Problem Relation Age of Onset   • Cancer Mother      uterus   • Cancer Father      lung   • Heart Disease Brother      half brother   • Diabetes Neg Hx    • Stroke Neg Hx        ROS:  Review of Systems   Constitutional: Negative for fever, chills, weight loss and malaise/fatigue.   Eyes: Negative for blurred vision.   Respiratory: Negative for cough, sputum production, shortness of breath and wheezing.    Cardiovascular: Negative for chest pain, palpitations, orthopnea and leg swelling.   Endo/Heme/Allergies: Does not bruise/bleed easily.               Exam:  Blood pressure 122/70, pulse 92, temperature 36.6 °C (97.9 °F), resp. rate 16, height 1.676 m (5' 5.98\"), weight 105.688 kg (233 lb), SpO2 97 %.  General:  Well nourished, well developed male in NAD  Head is grossly normal.  Neck: Supple without JVD or bruit. Thyroid is not enlarged. Trachea is midline.  Pulmonary: Clear to ausculation .  Normal effort. No rales, ronchi, or wheezing.  Cardiovascular: Regular rate and rhythm without murmur.  Abdomen-Abdomen is soft, normal bowel sounds, no masses, guarding, ororganomegaly, with 1+ midline suprapubic tenderness  Lower extremities- neg for pretibial edema, redness, tenderness.      Please note that this dictation was created using voice recognition software. I have made every reasonable attempt to correct obvious errors, but I expect that there are errors of grammar and possibly content that I did not discover before finalizing the note.    Assessment/Plan:  1. Obesity (BMI 30-39.9)  TSH    FREE THYROXINE   2. Interstitial cystitis  " hydrocodone/acetaminophen (NORCO)  MG Tab   3. Lumbar muscle pain         Plan: 1. Due to patient's extreme tendency to gain weight on minimal oral intake will update TSH and free T4  2. Renew Norco 10/325, #120-1 month supply  3. Revisit within 3 months

## 2017-04-28 NOTE — ASSESSMENT & PLAN NOTE
Patient expresses bilateral paralumbar pain oftentimes when he awakens in the morning. After several voidings that pain subsides. Denies any pain radiating down into either leg, leg numbness, focal leg weakness.

## 2017-04-28 NOTE — ASSESSMENT & PLAN NOTE
Patient expresses ongoing pelvic pain at stable levels over the past 3 months. No recent new urologic investigation. He is using Norco 10 mg typically 4 times per day with rare supplementation of Dilaudid 4 mg for severe peaks in pain. Denies any hematuria, urinary incontinence.

## 2017-05-03 ENCOUNTER — HOSPITAL ENCOUNTER (OUTPATIENT)
Dept: LAB | Facility: MEDICAL CENTER | Age: 62
End: 2017-05-03
Attending: FAMILY MEDICINE
Payer: MEDICAID

## 2017-05-03 DIAGNOSIS — E66.9 OBESITY (BMI 30-39.9): ICD-10-CM

## 2017-05-03 LAB
T4 FREE SERPL-MCNC: 0.76 NG/DL (ref 0.53–1.43)
TSH SERPL DL<=0.005 MIU/L-ACNC: 1.88 UIU/ML (ref 0.3–3.7)

## 2017-05-03 PROCEDURE — 84443 ASSAY THYROID STIM HORMONE: CPT

## 2017-05-03 PROCEDURE — 36415 COLL VENOUS BLD VENIPUNCTURE: CPT

## 2017-05-03 PROCEDURE — 84439 ASSAY OF FREE THYROXINE: CPT

## 2017-05-04 ENCOUNTER — TELEPHONE (OUTPATIENT)
Dept: MEDICAL GROUP | Facility: MEDICAL CENTER | Age: 62
End: 2017-05-04

## 2017-05-05 NOTE — TELEPHONE ENCOUNTER
----- Message from Michel Triana M.D. sent at 5/4/2017  7:50 AM PDT -----  Normal thyroid level on current labs

## 2017-05-22 ENCOUNTER — TELEPHONE (OUTPATIENT)
Dept: MEDICAL GROUP | Facility: MEDICAL CENTER | Age: 62
End: 2017-05-22

## 2017-05-22 RX ORDER — HALOBETASOL PROPIONATE 0.05 %
OINTMENT (GRAM) TOPICAL
Qty: 1 TUBE | Refills: 6 | Status: SHIPPED | OUTPATIENT
Start: 2017-05-22 | End: 2018-12-28

## 2017-05-24 DIAGNOSIS — N32.89 BLADDER SPASM: ICD-10-CM

## 2017-05-24 DIAGNOSIS — N30.10 INTERSTITIAL CYSTITIS: ICD-10-CM

## 2017-05-24 NOTE — TELEPHONE ENCOUNTER
Was the patient seen in the last year in this department? Yes     Does patient have an active prescription for medications requested? No     Received Request Via: Patient

## 2017-05-25 ENCOUNTER — TELEPHONE (OUTPATIENT)
Dept: MEDICAL GROUP | Facility: MEDICAL CENTER | Age: 62
End: 2017-05-25

## 2017-05-25 DIAGNOSIS — N32.89 BLADDER SPASM: ICD-10-CM

## 2017-05-25 DIAGNOSIS — N30.10 INTERSTITIAL CYSTITIS: ICD-10-CM

## 2017-05-25 RX ORDER — DIAZEPAM 10 MG/1
TABLET ORAL
Qty: 60 TAB | Refills: 0 | Status: SHIPPED | OUTPATIENT
Start: 2017-05-25 | End: 2017-06-22 | Stop reason: SDUPTHER

## 2017-05-25 RX ORDER — HYDROCODONE BITARTRATE AND ACETAMINOPHEN 10; 325 MG/1; MG/1
1 TABLET ORAL EVERY 6 HOURS PRN
Qty: 120 TAB | Refills: 0 | Status: SHIPPED | OUTPATIENT
Start: 2017-05-25 | End: 2017-05-25 | Stop reason: SDUPTHER

## 2017-05-25 RX ORDER — DIAZEPAM 10 MG/1
TABLET ORAL
Qty: 60 TAB | Refills: 0 | Status: SHIPPED | OUTPATIENT
Start: 2017-05-25 | End: 2017-05-25 | Stop reason: SDUPTHER

## 2017-05-25 RX ORDER — HYDROCODONE BITARTRATE AND ACETAMINOPHEN 10; 325 MG/1; MG/1
1 TABLET ORAL EVERY 6 HOURS PRN
Qty: 120 TAB | Refills: 0 | Status: SHIPPED | OUTPATIENT
Start: 2017-05-25 | End: 2017-06-23 | Stop reason: SDUPTHER

## 2017-06-20 DIAGNOSIS — I10 ESSENTIAL HYPERTENSION: ICD-10-CM

## 2017-06-20 RX ORDER — DILTIAZEM HYDROCHLORIDE 300 MG/1
CAPSULE, COATED, EXTENDED RELEASE ORAL
Qty: 30 CAP | Refills: 11 | Status: SHIPPED | OUTPATIENT
Start: 2017-06-20 | End: 2018-08-09 | Stop reason: SDUPTHER

## 2017-06-22 DIAGNOSIS — N32.89 BLADDER SPASM: ICD-10-CM

## 2017-06-22 RX ORDER — DIAZEPAM 10 MG/1
TABLET ORAL
Qty: 60 TAB | Refills: 0 | Status: SHIPPED | OUTPATIENT
Start: 2017-06-22 | End: 2017-10-26

## 2017-06-23 DIAGNOSIS — N30.10 INTERSTITIAL CYSTITIS: ICD-10-CM

## 2017-06-23 RX ORDER — HYDROCODONE BITARTRATE AND ACETAMINOPHEN 10; 325 MG/1; MG/1
1 TABLET ORAL EVERY 6 HOURS PRN
Qty: 120 TAB | Refills: 0 | Status: SHIPPED | OUTPATIENT
Start: 2017-06-23 | End: 2017-07-24 | Stop reason: SDUPTHER

## 2017-07-24 ENCOUNTER — OFFICE VISIT (OUTPATIENT)
Dept: MEDICAL GROUP | Facility: MEDICAL CENTER | Age: 62
End: 2017-07-24
Attending: FAMILY MEDICINE
Payer: MEDICAID

## 2017-07-24 ENCOUNTER — HOSPITAL ENCOUNTER (OUTPATIENT)
Facility: MEDICAL CENTER | Age: 62
End: 2017-07-24
Attending: FAMILY MEDICINE
Payer: MEDICAID

## 2017-07-24 VITALS
DIASTOLIC BLOOD PRESSURE: 70 MMHG | RESPIRATION RATE: 16 BRPM | WEIGHT: 227 LBS | BODY MASS INDEX: 36.48 KG/M2 | SYSTOLIC BLOOD PRESSURE: 140 MMHG | HEIGHT: 66 IN | OXYGEN SATURATION: 95 % | HEART RATE: 100 BPM | TEMPERATURE: 97.5 F

## 2017-07-24 DIAGNOSIS — G89.29 CHRONIC BILATERAL LOW BACK PAIN WITHOUT SCIATICA: ICD-10-CM

## 2017-07-24 DIAGNOSIS — M54.50 CHRONIC BILATERAL LOW BACK PAIN WITHOUT SCIATICA: ICD-10-CM

## 2017-07-24 DIAGNOSIS — Z79.891 CHRONIC USE OF OPIATE DRUGS THERAPEUTIC PURPOSES: ICD-10-CM

## 2017-07-24 DIAGNOSIS — N30.10 INTERSTITIAL CYSTITIS: ICD-10-CM

## 2017-07-24 DIAGNOSIS — N32.89 BLADDER SPASM: ICD-10-CM

## 2017-07-24 DIAGNOSIS — L30.9 DERMATITIS: ICD-10-CM

## 2017-07-24 DIAGNOSIS — R10.2 PELVIC PAIN: ICD-10-CM

## 2017-07-24 DIAGNOSIS — R21 RASH: ICD-10-CM

## 2017-07-24 PROCEDURE — 99214 OFFICE O/P EST MOD 30 MIN: CPT | Performed by: FAMILY MEDICINE

## 2017-07-24 PROCEDURE — 80307 DRUG TEST PRSMV CHEM ANLYZR: CPT

## 2017-07-24 PROCEDURE — 99213 OFFICE O/P EST LOW 20 MIN: CPT | Performed by: FAMILY MEDICINE

## 2017-07-24 PROCEDURE — G0480 DRUG TEST DEF 1-7 CLASSES: HCPCS

## 2017-07-24 PROCEDURE — G0480 DRUG TEST DEF 1-7 CLASSES: HCPCS | Mod: 91

## 2017-07-24 RX ORDER — HYDROMORPHONE HYDROCHLORIDE 4 MG/1
4 TABLET ORAL EVERY 6 HOURS PRN
Qty: 45 TAB | Refills: 0 | Status: SHIPPED | OUTPATIENT
Start: 2017-07-24 | End: 2017-10-26 | Stop reason: SDUPTHER

## 2017-07-24 RX ORDER — TRIAMTERENE AND HYDROCHLOROTHIAZIDE 37.5; 25 MG/1; MG/1
TABLET ORAL
Qty: 30 TAB | Refills: 6 | Status: SHIPPED | OUTPATIENT
Start: 2017-07-24 | End: 2018-03-12 | Stop reason: SDUPTHER

## 2017-07-24 RX ORDER — HYDROCODONE BITARTRATE AND ACETAMINOPHEN 10; 325 MG/1; MG/1
1 TABLET ORAL EVERY 6 HOURS PRN
Qty: 120 TAB | Refills: 0 | Status: SHIPPED | OUTPATIENT
Start: 2017-07-24 | End: 2017-08-22 | Stop reason: SDUPTHER

## 2017-07-24 RX ORDER — DIAZEPAM 10 MG/1
TABLET ORAL
Qty: 45 TAB | Refills: 0 | Status: SHIPPED | OUTPATIENT
Start: 2017-07-24 | End: 2017-08-22 | Stop reason: SDUPTHER

## 2017-07-24 ASSESSMENT — PAIN SCALES - GENERAL: PAINLEVEL: 5=MODERATE PAIN

## 2017-07-24 NOTE — MR AVS SNAPSHOT
"        Allen Mccabe   2017 8:50 AM   Office Visit   MRN: [id number]    [facility]   Dept Phone:  [phone]    Description:  Male : 1955   Provider:  Michel Triana M.D.           Reason for Visit     Follow-Up           Allergies as of 2017     Allergen Noted Reactions    Nkda [No Known Drug Allergy] 10/04/2010       Zofran [Dextrose-Ondansetron] 2015   Rash    Diffuse rash      You were diagnosed with     Chronic use of opiate drugs therapeutic purposes   [0361561]       Bladder spasm   [079003]       Interstitial cystitis   [879263]       Pelvic pain   [462256]       Chronic bilateral low back pain without sciatica   [3713577]       Rash   [485318]         Vital Signs     Blood Pressure Pulse Temperature Respirations Height Weight    140/70 mmHg 100 36.4 °C (97.5 °F) 16 1.676 m (5' 5.98\") 102.967 kg (227 lb)    Body Mass Index Oxygen Saturation Smoking Status             36.66 kg/m2 95% Never Smoker          Basic Information     Date Of Birth Sex Race Ethnicity Preferred Language    1955 Male White Non- English      Your appointments     Aug 22, 2017  8:50 AM   Established Patient with Michel Triana M.D.   [facility] ([facility])    [address] [place] [id number] [phone]           You will be receiving a confirmation call a few days before your appointment from our automated call confirmation system.              Problem List              ICD-10-CM Priority Class Noted - Resolved    Interstitial cystitis N30.10   Unknown - Present    Headache R51   Unknown - Present    Sprain of neck S13.9XXA   Unknown - Present    Chronic pain syndrome G89.4   6/15/2009 - Present    Hyperlipidemia E78.5   2010 - Present    Hypertension I10   2010 - Present    Obesity E66.9   2014 - Present    Skin lesion of back L98.9   2014 - Present    Recurrent cough R05   2015 - Present    Paresthesia of right leg " R20.2   2/6/2015 - Present    Chronic pharyngitis J31.2   4/1/2015 - Present    Esophageal reflux K21.9   4/1/2015 - Present    Dysphagia R13.10   4/1/2015 - Present    Thoracic myofascial strain S29.019A   4/28/2015 - Present    Rash R21   5/12/2015 - Present    Drug dermatitis L27.0   5/18/2015 - Present    Rectal bleeding K62.5   6/25/2015 - Present    Accessory skin tags Q82.8   10/2/2015 - Present    Lumbar muscle pain M54.5   10/27/2015 - Present    Multiple skin nodules R22.9   10/27/2015 - Present    Chronic lumbar pain M54.5, G89.29   12/24/2015 - Present    Dermatitis L30.9   12/24/2015 - Present    Neck pain of over 3 months duration M54.2, G89.29   1/21/2016 - Present    Skin lesion L98.9   2/12/2016 - Present    Obesity (BMI 30-39.9) E66.9   10/3/2016 - Present      Health Maintenance        Date Due Completion Dates    IMM ZOSTER VACCINE 3/7/2016 ---    IMM INFLUENZA (1) 9/1/2017 ---    COLONOSCOPY 1/5/2025 1/5/2015    IMM DTaP/Tdap/Td Vaccine (2 - Td) 2/17/2025 2/17/2015            Current Immunizations     Tdap Vaccine 2/17/2015      Below and/or attached are the medications your provider expects you to take. Review all of your home medications and newly ordered medications with your provider and/or pharmacist. Follow medication instructions as directed by your provider and/or pharmacist. Please keep your medication list with you and share with your provider. Update the information when medications are discontinued, doses are changed, or new medications (including over-the-counter products) are added; and carry medication information at all times in the event of emergency situations     Allergies:  NKDA - (reactions not documented)     ZOFRAN - Rash               Medications  Valid as of: July 24, 2017 -  9:30 AM    Generic Name Brand Name Tablet Size Instructions for use    Betamethasone Dipropionate (Ointment) DIPROLENE 0.05 % Apply thinly twice daily as needed        Betamethasone Dipropionate Aug  (Cream) DIPROLENE-AF 0.05 % Apply 1 g to affected area(s) 2 times a day.        Betamethasone Dipropionate Aug (Ointment) DIPROLENE-AF 0.05 % Apply 1 Application to affected area(s) 2 times a day.        Clobetasol Propionate (Ointment) TEMOVATE 0.05 % Apply 1 Application to affected area(s) 2 times a day.        Cyclobenzaprine HCl (Tab) FLEXERIL 10 MG TAKE 1 TAB BY MOUTH 3 TIMES A DAY AS NEEDED FOR MUSCLE SPASMS.        DiazePAM (Tab) VALIUM 10 MG 1.5 at HS PRN SLEEP & .5 in AM  for bladder spasms        DiazePAM (Tab) VALIUM 10 MG 1 tablet by mouth every morning, one half tablet by mouth every evening        DilTIAZem HCl Coated Beads (CAPSULE SR 24 HR) CARDIZEM  MG Take 1 Cap by mouth every day.        DilTIAZem HCl Coated Beads (CAPSULE SR 24 HR) CARDIZEM  MG TAKE ONE CAPSULE BY MOUTH ONCE DAILY.        Fluocinonide (Cream) LIDEX 0.05 % Apply 1 Application to affected area(s) 2 times a day.        Halobetasol Propionate (Ointment) ULTRAVATE 0.05 % Apply thinly twice daily        Hydrocodone-Acetaminophen (Tab) NORCO  MG Take 1 Tab by mouth every 6 hours as needed.        HYDROmorphone HCl (Tab) DILAUDID 4 MG Take 1 Tab by mouth every 6 hours as needed.        Lidocaine (Patch) LIDODERM 5 % APPLY 1 PATCH TO SKIN AS DIRECTED EVERY 24 HOURS.        Meloxicam (Tab) MOBIC 15 MG Take 1 Tab by mouth every day.        Methocarbamol (Tab) ROBAXIN 500 MG Take 2 Tabs by mouth 3 times a day.        Naproxen (Tab) NAPROSYN 500 MG Take 1 Tab by mouth 2 times a day, with meals.        Omeprazole (CAPSULE DELAYED RELEASE) PRILOSEC 20 MG Take 20 mg by mouth 2 times a day.        Pentosan Polysulfate Sodium (Cap) ELMIRON 100 MG Take 100 mg by mouth 3 times a day before meals.        Promethazine-Codeine (Syrup) PHENERGAN-CODEINE 6.25-10 MG/5ML Take 5-10 mL by mouth 4 times a day as needed for Cough.        RaNITidine HCl (Tab) ZANTAC 150 MG Take 300 mg by mouth as needed for Heartburn.        Terazosin HCl  (Cap) HYTRIN 5 MG TAKE ONE CAPSULE BY MOUTH ONCE DAILY        Triamcinolone Acetonide (Ointment) KENALOG 0.1 % Apply 1 Application to affected area(s) 2 times a day.        Triamterene-HCTZ (Tab) MAXZIDE-25/DYAZIDE 37.5-25 MG TAKE ONE TABLET BY MOUTH ONCE DAILY        .                 Medicines prescribed today were sent to:     [address] [address]    Phone: [phone] Fax: [phone]    Open 24 Hours?: No      Medication refill instructions:       If your prescription bottle indicates you have medication refills left, it is not necessary to call your provider’s office. Please contact your pharmacy and they will refill your medication.    If your prescription bottle indicates you do not have any refills left, you may request refills at any time through one of the following ways: The online [employer] system (except Urgent Care), by calling your provider’s office, or by asking your pharmacy to contact your provider’s office with a refill request. Medication refills are processed only during regular business hours and may not be available until the next business day. Your provider may request additional information or to have a follow-up visit with you prior to refilling your medication.   *Please Note: Medication refills are assigned a new Rx number when refilled electronically. Your pharmacy may indicate that no refills were authorized even though a new prescription for the same medication is available at the pharmacy. Please request the medicine by name with the pharmacy before contacting your provider for a refill.        Your To Do List     Future Labs/Procedures Complete By Expires    PAIN MANAGEMENT PANEL, SCRN W/ RFLX TO QNT  As directed 7/24/2018    Comments:    Current Meds (name, sig, last dose):   Current outpatient prescriptions:   •  hydrocodone/acetaminophen, 1 Tab, Oral, Q6HRS PRN  •  diazepam, 1.5 at HS PRN SLEEP & .5 in AM  for bladder  spasms  •  diltiazem CD, TAKE ONE CAPSULE BY MOUTH ONCE DAILY.  •  cyclobenzaprine, TAKE 1 TAB BY MOUTH 3 TIMES A DAY AS NEEDED FOR MUSCLE SPASMS.  •  halobetasol, Apply thinly twice daily  •  terazosin, TAKE ONE CAPSULE BY MOUTH ONCE DAILY  •  promethazine-codeine, 5-10 mL, Oral, 4X/DAY PRN  •  HYDROmorphone, 4 mg, Oral, Q6HRS PRN  •  clobetasol, 1 Application, Topical, BID  •  methocarbamol, 1,000 mg, Oral, TID  •  betamethasone dipropionate, Apply thinly twice daily as needed  •  meloxicam, 15 mg, Oral, DAILY  •  augmented betamethasone dipropionate, 1 Application, Topical, BID  •  lidocaine, APPLY 1 PATCH TO SKIN AS DIRECTED EVERY 24 HOURS.  •  triamterene-hctz, TAKE ONE TABLET BY MOUTH ONCE DAILY  •  fluocinonide, 1 Application, Topical, BID  •  triamcinolone acetonide, 1 Application, Topical, BID  •  Aug Betamethasone Dipropionate, 1 g, Topical, BID  •  pentosan, 100 mg, Oral, TID AC  •  naproxen, 500 mg, Oral, BID WITH MEALS  •  ranitidine, 300 mg, Oral, PRN, 1 week ago  •  diltiazem CD, 300 mg, Oral, QDAY, 2/11/2016 at Unknown time  •  omeprazole, 20 mg, Oral, BID, 1 week ago            Other Notes About Your Plan     Fall risk done 6/25/15           [employer] Access Code: Activation code not generated  Current Syncl[employer] Status: Active

## 2017-07-25 NOTE — ASSESSMENT & PLAN NOTE
Patient reports continued daily suprapubic achiness varying from moderate to occasionally severe. He is not sure how effective the current diazepam 15 mg in the morning and 5 mg in the evening is towards improving his recurrent bladder spasms. Denies any gross hematuria. Notes dysuria and incomplete emptying during the day better emptying at night when he has not urinated for 3 or 4 hours. He is continuing on Elmiron, along with Norco 4 times a day. We discussed again today the potential additive risk of respiratory depression when combining benzodiazepines such as his diazepam with opiates, such as his hydrocodone. Patient would like to try a gradual reduction in diazepam dose. He has not reported any dyspneic episodes or awakening severely short of breath at night. Patient has been taking these 2 medications in combination for over 2 years.

## 2017-07-25 NOTE — PROGRESS NOTES
Chief Complaint   Patient presents with   • Follow-Up       HISTORY OF PRESENT ILLNESS: Patient is a 62 y.o. male established patient who presents today to follow-up on dyshidrotic eczema affecting his fingers, recurrent lumbar pain, chronic interstitial cystitis, review medications.        Dermatitis  Patient notes reduced frequency of small 2-3 mm ulcerations/breaks in his cuticle on the nails of his hands since he is still using the last remnants of betamethasone previous prescription. He has tried a number of other medium and high-strength topical steroids as we have attempted to comply with prior authorization requests from his health plan. None of the others have been significantly effective. He has also tried milder topical OTC steroids also ineffective. Feet are not similarly affected. No recent fevers.    Chronic lumbar pain  Patient reports ongoing daily bilateral lumbar pain with fluctuating intensity of a very localized stabbing nature right paralumbar pain. Intensity can vary from minimal as today to quite severe but for just a few seconds but possibly recurring several times to the day. Patient reports no specific trigger for the sharp stabbing pain. No current leg numbness.    Interstitial cystitis  Patient reports continued daily suprapubic achiness varying from moderate to occasionally severe. He is not sure how effective the current diazepam 15 mg in the morning and 5 mg in the evening is towards improving his recurrent bladder spasms. Denies any gross hematuria. Notes dysuria and incomplete emptying during the day better emptying at night when he has not urinated for 3 or 4 hours. He is continuing on Elmiron, along with Norco 4 times a day. We discussed again today the potential additive risk of respiratory depression when combining benzodiazepines such as his diazepam with opiates, such as his hydrocodone. Patient would like to try a gradual reduction in diazepam dose. He has not reported any  dyspneic episodes or awakening severely short of breath at night. Patient has been taking these 2 medications in combination for over 2 years.      Patient Active Problem List    Diagnosis Date Noted   • Obesity (BMI 30-39.9) 10/03/2016   • Neck pain of over 3 months duration 01/21/2016   • Chronic lumbar pain 12/24/2015   • Dermatitis 12/24/2015   • Lumbar muscle pain 10/27/2015   • Drug dermatitis 05/18/2015   • Thoracic myofascial strain 04/28/2015   • Chronic pharyngitis 04/01/2015   • Esophageal reflux 04/01/2015   • Dysphagia 04/01/2015   • Recurrent cough 02/06/2015   • Paresthesia of right leg 02/06/2015   • Obesity 12/29/2014   • Skin lesion of back 12/29/2014   • Hypertension 06/29/2010   • Hyperlipidemia 06/11/2010   • Chronic pain syndrome 06/15/2009   • Interstitial cystitis    • Headache    • Sprain of neck      Social history-single, not working  Allergies:Nkda and Zofran    Current Outpatient Prescriptions   Medication Sig Dispense Refill   • diazepam (VALIUM) 10 MG tablet 1 tablet by mouth every morning, one half tablet by mouth every evening 45 Tab 0   • hydrocodone/acetaminophen (NORCO)  MG Tab Take 1 Tab by mouth every 6 hours as needed. 120 Tab 0   • HYDROmorphone (DILAUDID) 4 MG Tab Take 1 Tab by mouth every 6 hours as needed. 45 Tab 0   • triamterene-hctz (MAXZIDE-25/DYAZIDE) 37.5-25 MG Tab TAKE ONE TABLET BY MOUTH ONCE DAILY 30 Tab 6   • diazepam (VALIUM) 10 MG tablet 1.5 at HS PRN SLEEP & .5 in AM  for bladder spasms 60 Tab 0   • diltiazem CD (CARDIZEM CD) 300 MG CAPSULE SR 24 HR TAKE ONE CAPSULE BY MOUTH ONCE DAILY. 30 Cap 11   • cyclobenzaprine (FLEXERIL) 10 MG Tab TAKE 1 TAB BY MOUTH 3 TIMES A DAY AS NEEDED FOR MUSCLE SPASMS. 60 Tab 3   • halobetasol (ULTRAVATE) 0.05 % ointment Apply thinly twice daily 1 Tube 6   • terazosin (HYTRIN) 5 MG Cap TAKE ONE CAPSULE BY MOUTH ONCE DAILY 30 Cap 11   • promethazine-codeine (PHENERGAN-CODEINE) 6.25-10 MG/5ML Syrup Take 5-10 mL by mouth 4 times  a day as needed for Cough. 240 mL 0   • clobetasol (TEMOVATE) 0.05 % Ointment Apply 1 Application to affected area(s) 2 times a day. 60 g 0   • methocarbamol (ROBAXIN) 500 MG Tab Take 2 Tabs by mouth 3 times a day. 120 Tab 11   • betamethasone dipropionate (DIPROLENE) 0.05 % Ointment Apply thinly twice daily as needed 30 g 1   • meloxicam (MOBIC) 15 MG tablet Take 1 Tab by mouth every day. 30 Tab 6   • augmented betamethasone dipropionate (DIPROLENE-AF) 0.05 % ointment Apply 1 Application to affected area(s) 2 times a day. 30 g 3   • lidocaine (LIDODERM) 5 % Patch APPLY 1 PATCH TO SKIN AS DIRECTED EVERY 24 HOURS. 30 Patch 3   • fluocinonide (LIDEX) 0.05 % Cream Apply 1 Application to affected area(s) 2 times a day. 45 g 1   • triamcinolone acetonide (KENALOG) 0.1 % Ointment Apply 1 Application to affected area(s) 2 times a day. 60 g 1   • Aug Betamethasone Dipropionate (DIPROLENE-AF) 0.05 % Cream Apply 1 g to affected area(s) 2 times a day. 60 g 1   • pentosan (ELMIRON) 100 MG Cap Take 100 mg by mouth 3 times a day before meals.     • naproxen (NAPROSYN) 500 MG Tab Take 1 Tab by mouth 2 times a day, with meals. 60 Tab 3   • ranitidine (ZANTAC) 150 MG Tab Take 300 mg by mouth as needed for Heartburn.     • diltiazem CD (CARTIA XT) 300 MG CAPSULE SR 24 HR Take 1 Cap by mouth every day. 30 Cap 6   • omeprazole (PRILOSEC) 20 MG delayed-release capsule Take 20 mg by mouth 2 times a day.       No current facility-administered medications for this visit.       Social History   Substance Use Topics   • Smoking status: Never Smoker    • Smokeless tobacco: Never Used   • Alcohol Use: 0.0 oz/week     0 Standard drinks or equivalent per week      Comment: rare       Family History   Problem Relation Age of Onset   • Cancer Mother      uterus   • Cancer Father      lung   • Heart Disease Brother      half brother   • Diabetes Neg Hx    • Stroke Neg Hx        ROS:  Review of Systems   Constitutional: Negative for fever, chills,  "weight loss and malaise/fatigue.   Eyes: Negative for blurred vision.   Respiratory: Negative for cough, sputum production, shortness of breath and wheezing.    Cardiovascular: Negative for chest pain, palpitations, orthopnea and leg swelling.   Gastrointestinal: Negative for heartburn, nausea, vomiting and abdominal pain.    Endo/Heme/Allergies: Does not bruise/bleed easily.               Exam:  Blood pressure 140/70, pulse 100, temperature 36.4 °C (97.5 °F), resp. rate 16, height 1.676 m (5' 5.98\"), weight 102.967 kg (227 lb), SpO2 95 %.  General:  Well nourished, well developed male in NAD  Head is grossly normal.  Neck: Supple without JVD or bruit. Thyroid is not enlarged. Trachea is midline.  Pulmonary: Clear to ausculation .  Normal effort. No rales, ronchi, or wheezing.  Cardiovascular: Regular rate and rhythm without murmur.  Abdomen-Abdomen is soft, normal bowel sounds, no masses, guarding, ororganomegaly, with 1+ suprapubic tenderness in the midline  Lower extremities- neg for pretibial edema, redness, tenderness.   Hands- occasional 3 mm shallow breaks in the cuticle border on several fingernails without associated redness or discharge  Please note that this dictation was created using voice recognition software. I have made every reasonable attempt to correct obvious errors, but I expect that there are errors of grammar and possibly content that I did not discover before finalizing the note.    Assessment/Plan:  1. Chronic use of opiate drugs therapeutic purposes  Controlled Substance Treatment Agreement    PAIN MANAGEMENT PANEL, SCRN W/ RFLX TO QNT   2. Bladder spasm     3. Interstitial cystitis  hydrocodone/acetaminophen (NORCO)  MG Tab   4. Pelvic pain  HYDROmorphone (DILAUDID) 4 MG Tab   5. Chronic bilateral low back pain without sciatica     6. Rash     7. Dermatitis       Plan: 1. Update UDS, controlled substance agreement  2. Reduce diazepam dosing to 10 mg each morning and 5 mg each " evening  Renew Norco 10/325, #120, along with Dilaudid 4 mg which is used rarely for breakthrough pain (abdominal/back).  3. Radiating inquiry on the status of recent prior authorization for betamethasone steroid ointment

## 2017-07-25 NOTE — ASSESSMENT & PLAN NOTE
Patient reports ongoing daily bilateral lumbar pain with fluctuating intensity of a very localized stabbing nature right paralumbar pain. Intensity can vary from minimal as today to quite severe but for just a few seconds but possibly recurring several times to the day. Patient reports no specific trigger for the sharp stabbing pain. No current leg numbness.

## 2017-07-25 NOTE — ASSESSMENT & PLAN NOTE
Patient notes reduced frequency of small 2-3 mm ulcerations/breaks in his cuticle on the nails of his hands since he is still using the last remnants of betamethasone previous prescription. He has tried a number of other medium and high-strength topical steroids as we have attempted to comply with prior authorization requests from his health plan. None of the others have been significantly effective. He has also tried milder topical OTC steroids also ineffective. Feet are not similarly affected. No recent fevers.

## 2017-07-26 LAB
AMPHET CTO UR CFM-MCNC: NEGATIVE NG/ML
BARBITURATES CTO UR CFM-MCNC: NEGATIVE NG/ML
BENZODIAZ CTO UR CFM-MCNC: POSITIVE NG/ML
BUPRENORPHINE UR-MCNC: NEGATIVE NG/ML
CANNABINOIDS CTO UR CFM-MCNC: NEGATIVE NG/ML
CARISOPRODOL UR-MCNC: NEGATIVE NG/ML
COCAINE CTO UR CFM-MCNC: NEGATIVE NG/ML
DRUG SCREEN COMMENT UR-IMP: NORMAL
ETHYL GLUCURONIDE UR QL SCN: NEGATIVE NG/ML
FENTANYL UR-MCNC: NEGATIVE NG/ML
MEPERIDINE CTO UR SCN-MCNC: NEGATIVE NG/ML
METHADONE CTO UR CFM-MCNC: NEGATIVE NG/ML
OPIATES UR QL SCN: POSITIVE NG/ML
OXYCDOXYM URSCRN 2005102: NEGATIVE NG/ML
PCP CTO UR CFM-MCNC: NEGATIVE NG/ML
PROPOXYPH CTO UR CFM-MCNC: NEGATIVE NG/ML
TAPENTADOL UR-MCNC: NEGATIVE NG/ML
TRAMADOL CTO UR SCN-MCNC: NEGATIVE NG/ML
ZOLPIDEM UR-MCNC: NEGATIVE NG/ML

## 2017-07-29 LAB
1OH-MIDAZOLAM UR CFM-MCNC: <20 NG/ML
6MAM UR CFM-MCNC: <10 NG/ML
7AMINOCLONAZEPAM UR CFM-MCNC: <5 NG/ML
A-OH ALPRAZ UR CFM-MCNC: <5 NG/ML
ALPRAZ UR CFM-MCNC: <5 NG/ML
CHLORDIAZEP UR CFM-MCNC: <20 NG/ML
CLONAZEPAM UR CFM-MCNC: <5 NG/ML
CODEINE UR CFM-MCNC: <20 NG/ML
DIAZEPAM UR CFM-MCNC: <20 NG/ML
HYDROCODONE UR CFM-MCNC: 73 NG/ML
HYDROMORPHONE UR CFM-MCNC: <20 NG/ML
LORAZEPAM UR CFM-MCNC: <20 NG/ML
MIDAZOLAM UR CFM-MCNC: <20 NG/ML
MORPHINE UR CFM-MCNC: <20 NG/ML
NORDIAZEPAM UR CFM-MCNC: 203 NG/ML
NORHYDROCODONE UR CFM-MCNC: 169 NG/ML
NOROXYCODONE UR CFM-MCNC: <20 NG/ML
OPIATES UR NOROXYM Q0836: <20 NG/ML
OXAZEPAM UR CFM-MCNC: 1324 NG/ML
OXYCODONE UR CFM-MCNC: <20 NG/ML
OXYMORPHONE UR CFM-MCNC: <20 NG/ML
TEMAZEPAM UR CFM-MCNC: 254 NG/ML

## 2017-08-04 DIAGNOSIS — L30.9 DERMATITIS: ICD-10-CM

## 2017-08-04 RX ORDER — BETAMETHASONE DIPROPIONATE 0.05 %
OINTMENT (GRAM) TOPICAL
Qty: 30 G | Refills: 0 | Status: SHIPPED | OUTPATIENT
Start: 2017-08-04 | End: 2017-08-10 | Stop reason: SDUPTHER

## 2017-08-09 ENCOUNTER — PATIENT MESSAGE (OUTPATIENT)
Dept: MEDICAL GROUP | Facility: MEDICAL CENTER | Age: 62
End: 2017-08-09

## 2017-08-09 DIAGNOSIS — L30.9 DERMATITIS: ICD-10-CM

## 2017-08-10 RX ORDER — BETAMETHASONE DIPROPIONATE 0.05 %
OINTMENT (GRAM) TOPICAL
Qty: 30 G | Refills: 1 | Status: SHIPPED | OUTPATIENT
Start: 2017-08-10 | End: 2018-09-10 | Stop reason: SDUPTHER

## 2017-08-22 ENCOUNTER — OFFICE VISIT (OUTPATIENT)
Dept: MEDICAL GROUP | Facility: MEDICAL CENTER | Age: 62
End: 2017-08-22
Attending: FAMILY MEDICINE
Payer: MEDICAID

## 2017-08-22 VITALS
OXYGEN SATURATION: 96 % | DIASTOLIC BLOOD PRESSURE: 76 MMHG | BODY MASS INDEX: 36.96 KG/M2 | TEMPERATURE: 98.3 F | HEART RATE: 92 BPM | HEIGHT: 66 IN | SYSTOLIC BLOOD PRESSURE: 120 MMHG | WEIGHT: 230 LBS | RESPIRATION RATE: 16 BRPM

## 2017-08-22 DIAGNOSIS — E66.9 OBESITY (BMI 30-39.9): ICD-10-CM

## 2017-08-22 DIAGNOSIS — M25.562 LEFT ANTERIOR KNEE PAIN: ICD-10-CM

## 2017-08-22 DIAGNOSIS — N30.10 INTERSTITIAL CYSTITIS: ICD-10-CM

## 2017-08-22 PROCEDURE — 99214 OFFICE O/P EST MOD 30 MIN: CPT | Performed by: FAMILY MEDICINE

## 2017-08-22 PROCEDURE — 99213 OFFICE O/P EST LOW 20 MIN: CPT | Performed by: FAMILY MEDICINE

## 2017-08-22 RX ORDER — DIAZEPAM 10 MG/1
TABLET ORAL
Qty: 45 TAB | Refills: 2 | Status: SHIPPED | OUTPATIENT
Start: 2017-08-22 | End: 2017-10-26 | Stop reason: SDUPTHER

## 2017-08-22 RX ORDER — HYDROCODONE BITARTRATE AND ACETAMINOPHEN 10; 325 MG/1; MG/1
1 TABLET ORAL EVERY 6 HOURS PRN
Qty: 120 TAB | Refills: 0 | Status: SHIPPED | OUTPATIENT
Start: 2017-08-22 | End: 2017-09-13 | Stop reason: SDUPTHER

## 2017-08-22 ASSESSMENT — PAIN SCALES - GENERAL: PAINLEVEL: NO PAIN

## 2017-08-22 NOTE — PROGRESS NOTES
No chief complaint on file.      HISTORY OF PRESENT ILLNESS: Patient is a 62 y.o. male established patient who presents today to be evaluated for recent left anterior knee pain, persistent obesity, interstitial cystitis.        Left anterior knee pain  Patient reports he became down sideways on his left foot but did not sustain a full on sprain of his left ankle about one week ago. Shortly after that he notices intermittent pain in the inferior anterior left patellar region. So far this morning he has not had significant pain. Pain is typically they're at least half of the days for possibly several hours. Has not noticed any swelling of his left knee and reports no locking or buckling.    Obesity (BMI 30-39.9)  Patient has consistently markedly reduced his daily calorie intake and reports noted success with weight loss. He also reports he is walking at least 5 days a week typically for an hour to 90 minutes. Thyroid was checked in May and TSH was normal range. He is not reporting hair loss or cold intolerance. He is not experiencing any ankle edema.    Interstitial cystitis  Patient continues to have daily low pelvic pain. Not reporting any hematuria. Notes that urination is actually fairly effective with good urinary pressures through the night when he is asleep and not urinating for 2-3 hours before awakening. Typically is reporting nocturia 2-3 times per night. During the day he will feel a mild urge to urinate much more frequently but reports very low volume difficult to pass episodes. He is continuing 5-10 mg doses of diazepam to help control bladder spasms. Also is consistently taking Norco 10 mg 4 times a day with very rare intermittent use of Dilaudid 4 mg for breakthrough pain.      Patient Active Problem List    Diagnosis Date Noted   • Left anterior knee pain 08/22/2017   • Obesity (BMI 30-39.9) 10/03/2016   • Neck pain of over 3 months duration 01/21/2016   • Chronic lumbar pain 12/24/2015   • Dermatitis  12/24/2015   • Lumbar muscle pain 10/27/2015   • Drug dermatitis 05/18/2015   • Thoracic myofascial strain 04/28/2015   • Chronic pharyngitis 04/01/2015   • Esophageal reflux 04/01/2015   • Dysphagia 04/01/2015   • Recurrent cough 02/06/2015   • Paresthesia of right leg 02/06/2015   • Obesity 12/29/2014   • Skin lesion of back 12/29/2014   • Hypertension 06/29/2010   • Hyperlipidemia 06/11/2010   • Chronic pain syndrome 06/15/2009   • Interstitial cystitis    • Headache    • Sprain of neck      Social history-single, not working  Allergies:Nkda and Zofran    Current Outpatient Prescriptions   Medication Sig Dispense Refill   • diazepam (VALIUM) 10 MG tablet 1 tablet by mouth every morning, one half tablet by mouth every evening 45 Tab 2   • hydrocodone/acetaminophen (NORCO)  MG Tab Take 1 Tab by mouth every 6 hours as needed. 120 Tab 0   • betamethasone dipropionate (DIPROLENE) 0.05 % Ointment Apply thinly twice daily as needed 30 g 1   • HYDROmorphone (DILAUDID) 4 MG Tab Take 1 Tab by mouth every 6 hours as needed. 45 Tab 0   • triamterene-hctz (MAXZIDE-25/DYAZIDE) 37.5-25 MG Tab TAKE ONE TABLET BY MOUTH ONCE DAILY 30 Tab 6   • diazepam (VALIUM) 10 MG tablet 1.5 at HS PRN SLEEP & .5 in AM  for bladder spasms 60 Tab 0   • diltiazem CD (CARDIZEM CD) 300 MG CAPSULE SR 24 HR TAKE ONE CAPSULE BY MOUTH ONCE DAILY. 30 Cap 11   • cyclobenzaprine (FLEXERIL) 10 MG Tab TAKE 1 TAB BY MOUTH 3 TIMES A DAY AS NEEDED FOR MUSCLE SPASMS. 60 Tab 3   • halobetasol (ULTRAVATE) 0.05 % ointment Apply thinly twice daily 1 Tube 6   • terazosin (HYTRIN) 5 MG Cap TAKE ONE CAPSULE BY MOUTH ONCE DAILY 30 Cap 11   • promethazine-codeine (PHENERGAN-CODEINE) 6.25-10 MG/5ML Syrup Take 5-10 mL by mouth 4 times a day as needed for Cough. 240 mL 0   • clobetasol (TEMOVATE) 0.05 % Ointment Apply 1 Application to affected area(s) 2 times a day. 60 g 0   • methocarbamol (ROBAXIN) 500 MG Tab Take 2 Tabs by mouth 3 times a day. 120 Tab 11   •  meloxicam (MOBIC) 15 MG tablet Take 1 Tab by mouth every day. 30 Tab 6   • augmented betamethasone dipropionate (DIPROLENE-AF) 0.05 % ointment Apply 1 Application to affected area(s) 2 times a day. 30 g 3   • lidocaine (LIDODERM) 5 % Patch APPLY 1 PATCH TO SKIN AS DIRECTED EVERY 24 HOURS. 30 Patch 3   • fluocinonide (LIDEX) 0.05 % Cream Apply 1 Application to affected area(s) 2 times a day. 45 g 1   • triamcinolone acetonide (KENALOG) 0.1 % Ointment Apply 1 Application to affected area(s) 2 times a day. 60 g 1   • Aug Betamethasone Dipropionate (DIPROLENE-AF) 0.05 % Cream Apply 1 g to affected area(s) 2 times a day. 60 g 1   • pentosan (ELMIRON) 100 MG Cap Take 100 mg by mouth 3 times a day before meals.     • naproxen (NAPROSYN) 500 MG Tab Take 1 Tab by mouth 2 times a day, with meals. 60 Tab 3   • ranitidine (ZANTAC) 150 MG Tab Take 300 mg by mouth as needed for Heartburn.     • diltiazem CD (CARTIA XT) 300 MG CAPSULE SR 24 HR Take 1 Cap by mouth every day. 30 Cap 6   • omeprazole (PRILOSEC) 20 MG delayed-release capsule Take 20 mg by mouth 2 times a day.       No current facility-administered medications for this visit.       Social History   Substance Use Topics   • Smoking status: Never Smoker    • Smokeless tobacco: Never Used   • Alcohol Use: 0.0 oz/week     0 Standard drinks or equivalent per week      Comment: rare       Family History   Problem Relation Age of Onset   • Cancer Mother      uterus   • Cancer Father      lung   • Heart Disease Brother      half brother   • Diabetes Neg Hx    • Stroke Neg Hx        ROS:  Review of Systems   Constitutional: Negative for fever, chills, weight loss and malaise/fatigue.   Eyes: Negative for blurred vision.   Respiratory: Negative for cough, sputum production, shortness of breath and wheezing.    Cardiovascular: Negative for chest pain, palpitations, orthopnea and leg swelling.   Gastrointestinal: Negative for heartburn, nausea, vomiting    Endo/Heme/Allergies:  "Does not bruise/bleed easily.                Exam:  Blood pressure 120/76, pulse 92, temperature 36.8 °C (98.3 °F), resp. rate 16, height 1.676 m (5' 5.98\"), weight 104.327 kg (230 lb), SpO2 96 %.  General:  Well nourished, well developed male in NAD  Head is grossly normal.  Neck: Supple without JVD or bruit. Thyroid is not enlarged. Trachea is midline.  Pulmonary: Clear to ausculation .  Normal effort. No rales, ronchi, or wheezing.  Cardiovascular: Regular rate and rhythm without murmur.  Abdomen-Abdomen is soft, normal bowel sounds, no masses, guarding, ororganomegaly. 1+ bilateral suprapubic tenderness without rebound.  Lower extremities- neg for pretibial edema, redness, tenderness. Left knee shows no effusion or redness. There is no mediolateral instability. Mildly tender to palpation at the medial inferior portion of the patella. Slight patellar click with passive extension. Range of motion 0-90° of flexion. Left ankle shows no swelling or tenderness to palpation at this time      Please note that this dictation was created using voice recognition software. I have made every reasonable attempt to correct obvious errors, but I expect that there are errors of grammar and possibly content that I did not discover before finalizing the note.    Assessment/Plan:  1. Interstitial cystitis  COMP METABOLIC PANEL    hydrocodone/acetaminophen (NORCO)  MG Tab   2. Obesity (BMI 30-39.9)  COMP METABOLIC PANEL   3. Left anterior knee pain       Plan: 1. Suspect degenerative arthritis interfering with patellofemoral function-observe  2. Patient is encouraged to continue to daily reduce food portions  3. Collects a CMP, fasting  4. Renew diazepam 10 mg, #45  5. Renew Norco 10 mg, #120  6. Revisit one month     "

## 2017-08-22 NOTE — ASSESSMENT & PLAN NOTE
Patient has consistently markedly reduced his daily calorie intake and reports noted success with weight loss. He also reports he is walking at least 5 days a week typically for an hour to 90 minutes. Thyroid was checked in May and TSH was normal range. He is not reporting hair loss or cold intolerance. He is not experiencing any ankle edema.

## 2017-08-22 NOTE — ASSESSMENT & PLAN NOTE
Patient continues to have daily low pelvic pain. Not reporting any hematuria. Notes that urination is actually fairly effective with good urinary pressures through the night when he is asleep and not urinating for 2-3 hours before awakening. Typically is reporting nocturia 2-3 times per night. During the day he will feel a mild urge to urinate much more frequently but reports very low volume difficult to pass episodes. He is continuing 5-10 mg doses of diazepam to help control bladder spasms. Also is consistently taking Norco 10 mg 4 times a day with very rare intermittent use of Dilaudid 4 mg for breakthrough pain.

## 2017-08-22 NOTE — ASSESSMENT & PLAN NOTE
Patient reports he became down sideways on his left foot but did not sustain a full on sprain of his left ankle about one week ago. Shortly after that he notices intermittent pain in the inferior anterior left patellar region. So far this morning he has not had significant pain. Pain is typically they're at least half of the days for possibly several hours. Has not noticed any swelling of his left knee and reports no locking or buckling.

## 2017-08-22 NOTE — MR AVS SNAPSHOT
"        Allen Mccabe   2017 8:50 AM   Office Visit   MRN: [id number]    [facility]   Dept Phone:  [phone]    Description:  Male : 1955   Provider:  Michel Triana M.D.           Allergies as of 2017     Allergen Noted Reactions    Nkda [No Known Drug Allergy] 10/04/2010       Zofran [Dextrose-Ondansetron] 2015   Rash    Diffuse rash      You were diagnosed with     Interstitial cystitis   [593366]       Obesity (BMI 30-39.9)   [237204]       Left anterior knee pain   [867159]         Vital Signs     Blood Pressure Pulse Temperature Respirations Height Weight    120/76 mmHg 92 36.8 °C (98.3 °F) 16 1.676 m (5' 5.98\") 104.327 kg (230 lb)    Body Mass Index Oxygen Saturation Smoking Status             37.14 kg/m2 96% Never Smoker          Basic Information     Date Of Birth Sex Race Ethnicity Preferred Language    1955 Male White Non- English      Your appointments     Sep 25, 2017  8:50 AM   Established Patient with Michel Triana M.D.   [facility] ([facility])    [address] [place] [id number]   [phone]           You will be receiving a confirmation call a few days before your appointment from our automated call confirmation system.              Problem List              ICD-10-CM Priority Class Noted - Resolved    Interstitial cystitis N30.10   Unknown - Present    Headache R51   Unknown - Present    Sprain of neck S13.9XXA   Unknown - Present    Chronic pain syndrome G89.4   6/15/2009 - Present    Hyperlipidemia E78.5   2010 - Present    Hypertension I10   2010 - Present    Obesity E66.9   2014 - Present    Skin lesion of back L98.9   2014 - Present    Recurrent cough R05   2015 - Present    Paresthesia of right leg R20.2   2015 - Present    Chronic pharyngitis J31.2   2015 - Present    Esophageal reflux K21.9   2015 - Present    Dysphagia R13.10   2015 - Present    Thoracic " myofascial strain S29.019A   4/28/2015 - Present    Drug dermatitis L27.0   5/18/2015 - Present    Lumbar muscle pain M54.5   10/27/2015 - Present    Chronic lumbar pain M54.5, G89.29   12/24/2015 - Present    Dermatitis L30.9   12/24/2015 - Present    Neck pain of over 3 months duration M54.2, G89.29   1/21/2016 - Present    Obesity (BMI 30-39.9) E66.9   10/3/2016 - Present      Health Maintenance        Date Due Completion Dates    IMM ZOSTER VACCINE 3/7/2016 ---    IMM INFLUENZA (1) 9/1/2017 ---    COLONOSCOPY 1/5/2025 1/5/2015    IMM DTaP/Tdap/Td Vaccine (2 - Td) 2/17/2025 2/17/2015            Current Immunizations     Tdap Vaccine 2/17/2015      Below and/or attached are the medications your provider expects you to take. Review all of your home medications and newly ordered medications with your provider and/or pharmacist. Follow medication instructions as directed by your provider and/or pharmacist. Please keep your medication list with you and share with your provider. Update the information when medications are discontinued, doses are changed, or new medications (including over-the-counter products) are added; and carry medication information at all times in the event of emergency situations     Allergies:  NKDA - (reactions not documented)     ZOFRAN - Rash               Medications  Valid as of: August 22, 2017 -  9:28 AM    Generic Name Brand Name Tablet Size Instructions for use    Betamethasone Dipropionate (Ointment) DIPROLENE 0.05 % Apply thinly twice daily as needed        Betamethasone Dipropionate Aug (Cream) DIPROLENE-AF 0.05 % Apply 1 g to affected area(s) 2 times a day.        Betamethasone Dipropionate Aug (Ointment) DIPROLENE-AF 0.05 % Apply 1 Application to affected area(s) 2 times a day.        Clobetasol Propionate (Ointment) TEMOVATE 0.05 % Apply 1 Application to affected area(s) 2 times a day.        Cyclobenzaprine HCl (Tab) FLEXERIL 10 MG TAKE 1 TAB BY MOUTH 3 TIMES A DAY AS NEEDED FOR  MUSCLE SPASMS.        DiazePAM (Tab) VALIUM 10 MG 1.5 at HS PRN SLEEP & .5 in AM  for bladder spasms        DiazePAM (Tab) VALIUM 10 MG 1 tablet by mouth every morning, one half tablet by mouth every evening        DilTIAZem HCl Coated Beads (CAPSULE SR 24 HR) CARDIZEM  MG Take 1 Cap by mouth every day.        DilTIAZem HCl Coated Beads (CAPSULE SR 24 HR) CARDIZEM  MG TAKE ONE CAPSULE BY MOUTH ONCE DAILY.        Fluocinonide (Cream) LIDEX 0.05 % Apply 1 Application to affected area(s) 2 times a day.        Halobetasol Propionate (Ointment) ULTRAVATE 0.05 % Apply thinly twice daily        Hydrocodone-Acetaminophen (Tab) NORCO  MG Take 1 Tab by mouth every 6 hours as needed.        HYDROmorphone HCl (Tab) DILAUDID 4 MG Take 1 Tab by mouth every 6 hours as needed.        Lidocaine (Patch) LIDODERM 5 % APPLY 1 PATCH TO SKIN AS DIRECTED EVERY 24 HOURS.        Meloxicam (Tab) MOBIC 15 MG Take 1 Tab by mouth every day.        Methocarbamol (Tab) ROBAXIN 500 MG Take 2 Tabs by mouth 3 times a day.        Naproxen (Tab) NAPROSYN 500 MG Take 1 Tab by mouth 2 times a day, with meals.        Omeprazole (CAPSULE DELAYED RELEASE) PRILOSEC 20 MG Take 20 mg by mouth 2 times a day.        Pentosan Polysulfate Sodium (Cap) ELMIRON 100 MG Take 100 mg by mouth 3 times a day before meals.        Promethazine-Codeine (Syrup) PHENERGAN-CODEINE 6.25-10 MG/5ML Take 5-10 mL by mouth 4 times a day as needed for Cough.        RaNITidine HCl (Tab) ZANTAC 150 MG Take 300 mg by mouth as needed for Heartburn.        Terazosin HCl (Cap) HYTRIN 5 MG TAKE ONE CAPSULE BY MOUTH ONCE DAILY        Triamcinolone Acetonide (Ointment) KENALOG 0.1 % Apply 1 Application to affected area(s) 2 times a day.        Triamterene-HCTZ (Tab) MAXZIDE-25/DYAZIDE 37.5-25 MG TAKE ONE TABLET BY MOUTH ONCE DAILY        .                 Medicines prescribed today were sent to:     [id number] - [place], [place] - [address]    [address]  [place] [address]    Phone: [phone] Fax: [phone]    Open 24 Hours?: No      Medication refill instructions:       If your prescription bottle indicates you have medication refills left, it is not necessary to call your provider’s office. Please contact your pharmacy and they will refill your medication.    If your prescription bottle indicates you do not have any refills left, you may request refills at any time through one of the following ways: The online [employer] system (except Urgent Care), by calling your provider’s office, or by asking your pharmacy to contact your provider’s office with a refill request. Medication refills are processed only during regular business hours and may not be available until the next business day. Your provider may request additional information or to have a follow-up visit with you prior to refilling your medication.   *Please Note: Medication refills are assigned a new Rx number when refilled electronically. Your pharmacy may indicate that no refills were authorized even though a new prescription for the same medication is available at the pharmacy. Please request the medicine by name with the pharmacy before contacting your provider for a refill.        Your To Do List     Future Labs/Procedures Complete By Expires    COMP METABOLIC PANEL  As directed 8/23/2018      Other Notes About Your Plan     Fall risk done 6/25/15           [employer] Access Code: Activation code not generated  Current [employer] Status: Active

## 2017-09-13 DIAGNOSIS — N30.10 INTERSTITIAL CYSTITIS: ICD-10-CM

## 2017-09-13 RX ORDER — HYDROCODONE BITARTRATE AND ACETAMINOPHEN 10; 325 MG/1; MG/1
TABLET ORAL
Qty: 120 TAB | Refills: 0 | Status: SHIPPED | OUTPATIENT
Start: 2017-09-13 | End: 2017-09-25 | Stop reason: SDUPTHER

## 2017-09-25 ENCOUNTER — OFFICE VISIT (OUTPATIENT)
Dept: MEDICAL GROUP | Facility: MEDICAL CENTER | Age: 62
End: 2017-09-25
Attending: FAMILY MEDICINE
Payer: MEDICAID

## 2017-09-25 VITALS
RESPIRATION RATE: 16 BRPM | HEIGHT: 66 IN | DIASTOLIC BLOOD PRESSURE: 64 MMHG | WEIGHT: 225 LBS | TEMPERATURE: 97.3 F | HEART RATE: 92 BPM | SYSTOLIC BLOOD PRESSURE: 120 MMHG | OXYGEN SATURATION: 96 % | BODY MASS INDEX: 36.16 KG/M2

## 2017-09-25 DIAGNOSIS — G89.29 CHRONIC BILATERAL LOW BACK PAIN WITHOUT SCIATICA: ICD-10-CM

## 2017-09-25 DIAGNOSIS — N30.10 INTERSTITIAL CYSTITIS: ICD-10-CM

## 2017-09-25 DIAGNOSIS — M54.50 CHRONIC BILATERAL LOW BACK PAIN WITHOUT SCIATICA: ICD-10-CM

## 2017-09-25 PROCEDURE — 99213 OFFICE O/P EST LOW 20 MIN: CPT | Performed by: FAMILY MEDICINE

## 2017-09-25 RX ORDER — HYDROCODONE BITARTRATE AND ACETAMINOPHEN 10; 325 MG/1; MG/1
TABLET ORAL
Qty: 120 TAB | Refills: 0 | Status: SHIPPED | OUTPATIENT
Start: 2017-09-25 | End: 2017-10-26 | Stop reason: SDUPTHER

## 2017-09-25 ASSESSMENT — PAIN SCALES - GENERAL: PAINLEVEL: NO PAIN

## 2017-09-25 NOTE — PROGRESS NOTES
Subjective:      Allen Mccabe is a 62 y.o. male who presents with No chief complaint on file.            HPI 1. Interstitial cystitis-patient reports that his last refill of Norco 10/325, up to 4 times daily was on 822. He was not able to get his refill accomplished last week and even stretching his medicine out his last dose of Norco was about 1 PM yesterday. He is experiencing increased pelvic pain and moderate lower back pain in the past 15 hours or so. Not reporting any sweats or diaphoresis. He is still continuing diazepam 10 mg, 1 every morning and one half tablet at bedtime. Patient reports last urologic consultation was approximately 1 year ago. Patient was concerned over the specialist desire to re-cystoscope him. This is been done 2 or 3 times in the past, with no abnormalities seen other than on the first study (bladder wall tumors, irritation). Patient reports he has discontinued his Elmiron about 1 month ago. Is not reporting any worsening or change in bladder symptom level.    2. Chronic lumbar pain-patient continues to experience soreness and tightness across to his lower to mid back area bilaterally. Pain does not radiate into either lower extremity.    ROS negative for gross hematuria, urinary incontinence, diarrhea. Negative for dyspnea       Objective:     /64   Pulse 92   Temp 36.3 °C (97.3 °F)   Resp 16   Wt 102.1 kg (225 lb)   SpO2 96%   BMI 36.33 kg/m²   Height 1.676m  Physical Exam   Gen.- alert, cooperative, in no acute distress  Neck- midline trachea, thyroid not enlarged or tender,supple, no cervical adenopathy  Chest-clear to auscultation and percussion with normal breath sounds. No retractions. Chest wall nontender  Cardiac- regular rhythm and rate. No murmur, thrill, or heave  Abdomen- normal bowel sounds, soft without guarding. Liver and spleen not enlarged, no palpable masses . 1-2+ tender bilaterally over the right lower quadrant and left lower quadrant and  suprapubic area.          Assessment/Plan:     1. Interstitial cystitis-functional level significantly improved utilizing a controlled use of Norco 10/325 4 times a day, along with diazepam.    - hydrocodone/acetaminophen (NORCO)  MG Tab; TAKE 1 TABLET ORALLY EVERY 6 HOURS IF NEEDED  Dispense: 120 Tab; Refill: 0    2. Chronic bilateral low back pain without sciatica    Plan: 1. Set up opiate prescribing visit next few weeks

## 2017-10-10 ENCOUNTER — PATIENT MESSAGE (OUTPATIENT)
Dept: MEDICAL GROUP | Facility: MEDICAL CENTER | Age: 62
End: 2017-10-10

## 2017-10-26 ENCOUNTER — OFFICE VISIT (OUTPATIENT)
Dept: MEDICAL GROUP | Facility: MEDICAL CENTER | Age: 62
End: 2017-10-26
Attending: FAMILY MEDICINE
Payer: MEDICAID

## 2017-10-26 VITALS
SYSTOLIC BLOOD PRESSURE: 112 MMHG | HEART RATE: 100 BPM | TEMPERATURE: 97.8 F | DIASTOLIC BLOOD PRESSURE: 60 MMHG | OXYGEN SATURATION: 94 % | RESPIRATION RATE: 16 BRPM | BODY MASS INDEX: 36.16 KG/M2 | HEIGHT: 66 IN | WEIGHT: 225 LBS

## 2017-10-26 DIAGNOSIS — N30.10 INTERSTITIAL CYSTITIS: ICD-10-CM

## 2017-10-26 DIAGNOSIS — R10.2 PELVIC PAIN: ICD-10-CM

## 2017-10-26 PROCEDURE — 99214 OFFICE O/P EST MOD 30 MIN: CPT | Performed by: FAMILY MEDICINE

## 2017-10-26 PROCEDURE — 99212 OFFICE O/P EST SF 10 MIN: CPT | Performed by: FAMILY MEDICINE

## 2017-10-26 RX ORDER — HYDROMORPHONE HYDROCHLORIDE 4 MG/1
4 TABLET ORAL EVERY 6 HOURS PRN
Qty: 45 TAB | Refills: 0 | Status: SHIPPED | OUTPATIENT
Start: 2017-10-26 | End: 2018-01-18 | Stop reason: SDUPTHER

## 2017-10-26 RX ORDER — LIDOCAINE 50 MG/G
1 PATCH TOPICAL EVERY 24 HOURS
Qty: 30 PATCH | Refills: 6 | Status: SHIPPED | OUTPATIENT
Start: 2017-10-26 | End: 2018-12-28

## 2017-10-26 RX ORDER — HYDROCODONE BITARTRATE AND ACETAMINOPHEN 10; 325 MG/1; MG/1
TABLET ORAL
Qty: 120 TAB | Refills: 0 | Status: SHIPPED | OUTPATIENT
Start: 2017-10-26 | End: 2018-01-18 | Stop reason: SDUPTHER

## 2017-10-26 RX ORDER — DIAZEPAM 10 MG/1
TABLET ORAL
Qty: 45 TAB | Refills: 1 | Status: SHIPPED | OUTPATIENT
Start: 2017-10-26 | End: 2017-10-26

## 2017-10-26 RX ORDER — HYDROCODONE BITARTRATE AND ACETAMINOPHEN 10; 325 MG/1; MG/1
TABLET ORAL
Qty: 120 TAB | Refills: 0 | Status: SHIPPED | OUTPATIENT
Start: 2017-10-26 | End: 2017-10-26 | Stop reason: SDUPTHER

## 2017-10-26 ASSESSMENT — PAIN SCALES - GENERAL: PAINLEVEL: 6=MODERATE PAIN

## 2017-10-26 NOTE — PROGRESS NOTES
Chief Complaint/HPI:  Allen Mccabe is a 62 y.o.  who presents for follow up/evaluation of chronic pain and medication management.    Location of pain  1. Pelvic pain/Interstitial Cystitis          Location  1( as above) 2( as above) 3 ( as above)   Onset, When did your symptoms start? 2004     Quality: constant or intermittent cnstant     Descriptors: aching, burning, throbbing,shooting,sharp,stabbing,squeezing,pressure,soreness Burning, aching     Changed since last visit? no     Present level(1-10), average in last week 5-6     Best level in the last week 5     Worst level in the last week 8-9     Worsens with: Delayed urination     Improves with: AM urination     Associated symptoms      Is the amount of pain relief you are now obtaining from your current pain relievers enough to make a real difference in your life? Are you satisfied with your current level of pain control? Yes, yes           Since last assessment level of function(ADL) has    Physical Functioning: not changed   Mood:not changed     Family Relationships:not changed   Sleep pattern:not changed   Social Relationships:not changed   Overall functioning:not changed     Potential aberrant behavior Unkempt/impaired:  No Changes route of administration:  No Abusing alcohol or illicit drugs:  NO    Involved in accident:  No Use medication in response to situational stress:  No Purposeful over sedation:    No      Requesting early renewals:  No Insists on certain med by name:  No Negative mood change:    No    Appears intoxicated:  No Multiple prescribers:  No Reports lost or stolen meds:  No    Hoarding Medication:  No Arrested by police:  No Contact with street culture:  No               Comments:           ROS  Any side effects from current medications?          Other therapies tried Date Outcome/Comments/Notes   Nausea:  no   Physical Therapy     Vomiting:none   Occupational Therapy     Constipation:none   Accupunture     Itching:none    Behavioral Therapy     Mental status changes:none Tens Unit     Sweating:none   Pain Management Referral     Fatigue:mild        Drowsiness:none   Previous Imaging/work up     Overall severity of side effects:none            Current medications, problem list, allergies, tobacco and alcohol use were reviewed in EPIC today      PHYSICAL EXAMINATION:  There were no vitals taken for this visit.  General: Well-developed, well-nourished,well kept, no apparent distress, pleasant and cooperative with the examination.  Skin: Warm and dry. No rashes or lesions in visible areas.  Cor: Regular rate and rhythm without murmur gallop or rub.  Lungs: Clear to auscultation with equal breath sounds bilaterally. No wheezes, rhonchi.  Extremities: No cyanosis, clubbing or edema  Musculoskeletal: 1+ tender across lower back  Neuro: intact light touch, intact strength   Psychiatric: Alert and oriented x3, Normal affect and mood.    Aware reviewed yes, scanned into EPIC.      ASSESSMENT/Plan:  1.Chronic pelvic pain/Interstitial Cystitis  Plan: 1. Patient would like to taper off diazepam due to lack of perceived effect-we'll have him take half of a 10 mg tablet twice a day for 1 week then one half of a tablet once a day for 1 more week then discontinue  2. Will write November and December and Norco prescriptions the patient will deliver to his pharmacy, recently picked up his October prescription on 10/23  3. Renew Dilaudid Rx #45  4. Revisit within 3 months

## 2018-01-18 ENCOUNTER — OFFICE VISIT (OUTPATIENT)
Dept: MEDICAL GROUP | Facility: MEDICAL CENTER | Age: 63
End: 2018-01-18
Attending: FAMILY MEDICINE
Payer: MEDICAID

## 2018-01-18 VITALS
WEIGHT: 211 LBS | HEIGHT: 66 IN | SYSTOLIC BLOOD PRESSURE: 122 MMHG | RESPIRATION RATE: 16 BRPM | BODY MASS INDEX: 33.91 KG/M2 | OXYGEN SATURATION: 95 % | DIASTOLIC BLOOD PRESSURE: 76 MMHG | TEMPERATURE: 97.8 F | HEART RATE: 100 BPM

## 2018-01-18 DIAGNOSIS — G89.4 CHRONIC PAIN SYNDROME: ICD-10-CM

## 2018-01-18 DIAGNOSIS — R10.2 PELVIC PAIN: ICD-10-CM

## 2018-01-18 DIAGNOSIS — M54.50 CHRONIC BILATERAL LOW BACK PAIN WITHOUT SCIATICA: ICD-10-CM

## 2018-01-18 DIAGNOSIS — N30.10 INTERSTITIAL CYSTITIS: Primary | ICD-10-CM

## 2018-01-18 DIAGNOSIS — G89.29 CHRONIC BILATERAL LOW BACK PAIN WITHOUT SCIATICA: ICD-10-CM

## 2018-01-18 PROCEDURE — 99212 OFFICE O/P EST SF 10 MIN: CPT | Performed by: FAMILY MEDICINE

## 2018-01-18 PROCEDURE — 99213 OFFICE O/P EST LOW 20 MIN: CPT | Performed by: FAMILY MEDICINE

## 2018-01-18 RX ORDER — HYDROCODONE BITARTRATE AND ACETAMINOPHEN 10; 325 MG/1; MG/1
TABLET ORAL
Qty: 120 TAB | Refills: 0 | Status: SHIPPED | OUTPATIENT
Start: 2018-03-15 | End: 2018-04-10 | Stop reason: SDUPTHER

## 2018-01-18 RX ORDER — HYDROMORPHONE HYDROCHLORIDE 4 MG/1
4 TABLET ORAL EVERY 6 HOURS PRN
Qty: 45 TAB | Refills: 0 | Status: SHIPPED | OUTPATIENT
Start: 2018-01-18 | End: 2018-04-10 | Stop reason: SDUPTHER

## 2018-01-18 RX ORDER — HYDROCODONE BITARTRATE AND ACETAMINOPHEN 10; 325 MG/1; MG/1
TABLET ORAL
Qty: 120 TAB | Refills: 0 | Status: SHIPPED | OUTPATIENT
Start: 2018-02-15 | End: 2018-01-18 | Stop reason: SDUPTHER

## 2018-01-18 RX ORDER — HYDROCODONE BITARTRATE AND ACETAMINOPHEN 10; 325 MG/1; MG/1
TABLET ORAL
Qty: 120 TAB | Refills: 0 | Status: SHIPPED | OUTPATIENT
Start: 2018-01-18 | End: 2018-01-18 | Stop reason: SDUPTHER

## 2018-01-18 ASSESSMENT — PAIN SCALES - GENERAL: PAINLEVEL: 7=MODERATE-SEVERE PAIN

## 2018-01-18 NOTE — PROGRESS NOTES
"Subjective:      Allen Mccabe is a 62 y.o. male who presents with Follow-Up            HPI 1. Interstitial cystitis/chronic pelvic pain-patient reports that for the most part he has been managing reasonably well using the Norco 10 mg 4 times daily with occasional use of Dilaudid for breakthrough pain. Recently he had a transient issue with increased dental pain and his pain pill usage went up. Reports that he's been out of medicine for approximately 5 days. Dental pain has now improved with resumption of using a nighttime bite guard.  2. Chronic lumbar pain-patient reports a worsening in the past several weeks of bilateral low back ache. He is not reporting significant spasms. Reports that he is not got much benefit recently using Flexeril, and Robaxin after his narcotics had been consumed. Not reporting any pain radiating down in either leg or lower extremity numbness. He did physical therapy about 2-3 years ago did not recall much effect at that time. He has lost 13 pounds.    ROS negative for gross hematuria, fever, urinary incontinence       Objective:     /76   Pulse 100   Temp 36.6 °C (97.8 °F)   Resp 16   Ht 1.676 m (5' 5.98\")   Wt 95.7 kg (211 lb)   SpO2 95%   BMI 34.07 kg/m²      Physical Exam  Gen.- alert, cooperative, in no acute distress  Neck- midline trachea, thyroid not enlarged or tender,supple, no cervical adenopathy  Chest-clear to auscultation and percussion with normal breath sounds. No retractions. Chest wall nontender  Cardiac- regular rhythm and rate. No murmur, thrill, or heave  Back-1+ tender from L2-S3 in the midline and the parasacral areas bilaterally.  Lower extremities-intact light touch, strength bilaterally          Assessment/Plan:     1. Interstitial cystitis    - CONSENT FOR OPIATE PRESCRIPTION  - hydrocodone/acetaminophen (NORCO)  MG Tab; TAKE 1 TABLET ORALLY EVERY 6 HOURS IF NEEDED  Dispense: 120 Tab; Refill: 0    2. Chronic pain syndrome    - CONSENT " FOR OPIATE PRESCRIPTION  - hydrocodone/acetaminophen (NORCO)  MG Tab; TAKE 1 TABLET ORALLY EVERY 6 HOURS IF NEEDED  Dispense: 120 Tab; Refill: 0    3. Chronic bilateral low back pain without sciatica      4. Pelvic pain    - HYDROmorphone (DILAUDID) 4 MG Tab; Take 1 Tab by mouth every 6 hours as needed for up to 90 days.  Dispense: 45 Tab; Refill: 0  Plan: 1. Renew Norco, Dilaudid-3 months  2. Trial of physical therapy  3. Discontinue meloxicam due to lack of effect  4. Revisit within 3 months or sooner as needed

## 2018-01-29 ENCOUNTER — PHYSICAL THERAPY (OUTPATIENT)
Dept: PHYSICAL THERAPY | Facility: REHABILITATION | Age: 63
End: 2018-01-29
Attending: FAMILY MEDICINE
Payer: MEDICAID

## 2018-01-29 DIAGNOSIS — G89.29 CHRONIC LOW BACK PAIN WITHOUT SCIATICA, UNSPECIFIED BACK PAIN LATERALITY: ICD-10-CM

## 2018-01-29 DIAGNOSIS — M54.50 CHRONIC LOW BACK PAIN WITHOUT SCIATICA, UNSPECIFIED BACK PAIN LATERALITY: ICD-10-CM

## 2018-01-29 PROCEDURE — 97161 PT EVAL LOW COMPLEX 20 MIN: CPT

## 2018-01-29 ASSESSMENT — ENCOUNTER SYMPTOMS
EXACERBATED BY: BENDING
PAIN SCALE: 5
PAIN SCALE AT LOWEST: 4
PAIN TIMING: ALL DAY
ALLEVIATING FACTORS: HEAT APPLICATION
QUALITY: ACHING
ALLEVIATING FACTORS: STANDING
EXACERBATED BY: DRESSING
ALLEVIATING FACTORS: PAIN MEDICATION
PAIN SCALE AT HIGHEST: 8
QUALITY: RADIATING
QUALITY: DEEP
EXACERBATED BY: SITTING
PAIN TIMING: ON AWAKENING

## 2018-01-29 NOTE — OP THERAPY EVALUATION
Outpatient Physical Therapy  INITIAL EVALUATION    [facility] Physical [facility]  [address]  [address]  [place] [address]  Phone:  [phone]  Fax:  [phone]    Date of Evaluation: 2018    Patient: Allen Mccabe  YOB: 1955  MRN: [id number]     Referring Provider: Michel Triana M.D.  [address]  [place] [address]   Referring Diagnosis Chronic bilateral low back pain without sciatica [M54.5, G89.29]     Time Calculation  Start time: 930  Stop time: 1000 Time Calculation (min): 30 minutes     Physical Therapy Occurrence Codes    Date of onset of impairment:  13   Date physical therapy care plan established or reviewed:  18          Chief Complaint: Back Pain    Visit Diagnoses     ICD-10-CM   1. Chronic low back pain without sciatica, unspecified back pain laterality M54.5    G89.29         Subjective:   History of Present Illness:     Mechanism of injury:  Slow progression of LBP that has reached a plateau. Pt also reports pain that starts in upper back/neck and spreads across shoulders for the last several months.    PMHx includes interstitial cystitis  Prior level of function:  Walking 5 miles/day, 5x/wk (previously avid runner), Shooting range 3x/month  Headaches:  tension headaches  Sleep disturbance:  Not disrupted  Pain:     Current pain ratin    At best pain ratin    At worst pain ratin    Quality:  Deep, aching and radiating    Pain timing:  All day and on awakening    Relieving factors:  Pain medication, heat application and standing    Aggravating factors:  Sitting, bending and dressing      Past Medical History:   Diagnosis Date   • Dyshidrotic eczema    • Headache(784.0)    • Hypertension    • Interstitial cystitis    • Obesity    • Pain     throat   • Sprain of neck    • Urinary bladder disorder      Past Surgical History:   Procedure Laterality Date   • BASAL CELL EXCISION Left 2016    Procedure: SQUAMOUS  CELL EXCISION EAR AND EXCISION LESION EAR;  Surgeon: Austen Epps M.D.;  Location: [facility];  Service:    • LARYNGOSCOPY  4/1/2015    Performed by Michele Maynard M.D. at [facility]   • ESOPHAGOSCOPY  4/1/2015    Performed by Michele Maynard M.D. at [facility]   • CERVICAL FUSION POSTERIOR  2003   • APPENDECTOMY     • CARPAL TUNNEL RELEASE      R   • CYSTOSCOPY     • HERNIA REPAIR      bilat   • OTHER      bladder surgery   • TONSILLECTOMY     • TURP-VAPOR       Social History   Substance Use Topics   • Smoking status: Never Smoker   • Smokeless tobacco: Never Used   • Alcohol use 0.0 oz/week      Comment: rare     Family and Occupational History     Social History   • Marital status: Single     Spouse name: N/A   • Number of children: N/A   • Years of education: N/A       Objective     Neurological Testing     Myotome testing   Lumbar (left)   All left lumbar myotomes within normal limits    Lumbar (right)   All right lumbar myotomes within normal limits    Active Range of Motion     Lumbar   Flexion: Lumbar active flexion: 50%, FT mid-shin//increase pull across central L/S.  Extension: Lumbar active extension: 50%  Left lateral flexion: within functional limits  Right lateral flexion: within functional limits    Joint Play   Spine     Central PA [place]        L4: painful and hypomobile       L5: hypomobile and painful       S1: hypomobile and painful    Unilateral PA Glide (left)        L5: painful and hypomobile    Unilateral PA Glide (right)        L5: painful and hypomobile          Therapeutic Exercises (CPT 40630):     Total treatment time spent on Therapeutic Exercises: 5 minutes.      1. HEP, Prone lying x10min/day        Assessment, Response and Plan:   Assessment details:  62 yr old male w/chronic LBP as well as newer onset upper back pain. Pt's sxs consistent w/disc derangement in L/S. Skilled PT indicated to address the following goals.   Goals:    Short Term Goals:   Pt able to sit as needed during day w/25% decreased c/o LBP  Pt able to get dressed w/25% decreased c/o LBP  Short term goal time span:  1-2 weeks      Long Term Goals:    Pt able to sit as needed during day w/75% decreased c/o LBP  Pt able to get dressed w/75% decreased c/o LBP  Pt able to stabilize L/S w/open chain challenge  Long term goal time span:  4-6 weeks    Plan:   Therapy options:  Physical therapy treatment to continue  Planned therapy interventions:  E Stim Unattended (CPT 66981), Mechanical Traction (CPT 21557) and Therapeutic Exercise (CPT 87285)  Frequency:  2x week  Duration in weeks:  6  Plan details:  Cont skilled PT to address T/S mobility, L/S stability, and functional re-training.      Referring provider co-signature:  I have reviewed this plan of care and my co-signature certifies the need for services.    Physician Signature: ________________________________ Date: ______________

## 2018-03-16 RX ORDER — TRIAMTERENE AND HYDROCHLOROTHIAZIDE 37.5; 25 MG/1; MG/1
TABLET ORAL
Qty: 30 TAB | Refills: 0 | Status: SHIPPED | OUTPATIENT
Start: 2018-03-16 | End: 2018-04-09 | Stop reason: SDUPTHER

## 2018-03-19 ENCOUNTER — PHYSICAL THERAPY (OUTPATIENT)
Dept: PHYSICAL THERAPY | Facility: REHABILITATION | Age: 63
End: 2018-03-19
Attending: FAMILY MEDICINE
Payer: MEDICAID

## 2018-03-19 DIAGNOSIS — M54.50 CHRONIC LOW BACK PAIN WITHOUT SCIATICA, UNSPECIFIED BACK PAIN LATERALITY: ICD-10-CM

## 2018-03-19 DIAGNOSIS — G89.29 CHRONIC LOW BACK PAIN WITHOUT SCIATICA, UNSPECIFIED BACK PAIN LATERALITY: ICD-10-CM

## 2018-03-19 PROCEDURE — 97014 ELECTRIC STIMULATION THERAPY: CPT

## 2018-03-19 NOTE — OP THERAPY DAILY TREATMENT
Outpatient Physical Therapy  DAILY TREATMENT     [facility] Physical [address]  [address]  [place] [address]  Phone:  [phone]  Fax:  [phone]    Date: 03/19/2018    Patient: Allen Mccabe  YOB: 1955  MRN: [id number]     Time Calculation  Start time: 0930  Stop time: 1020 Time Calculation (min): 50 minutes     Chief Complaint: Back Pain    Visit #: 2    SUBJECTIVE:  Pt reports pain is about the same. Waited over a month for authorization.    OBJECTIVE:    Therapeutic Exercises (CPT 98137):     1. Prone press on elbows , x10    2. Full prone press, x10    3. 3D pelvic tilts, x10 ea    4. TrA isometrics, 10x10 sec    5. Ball curls w/cuff, x30    Therapeutic Treatments and Modalities:     1. E Stim Unattended (CPT 30308), IFC and MHP to L/S prone inclined    Time-based treatments/modalities:  Therapeutic exercise minutes (CPT 31418): 30 minutes     Pain rating before treatment: 6  Pain rating after treatment: 5    ASSESSMENT:   Pt w/poor breathing habits increasing abdominal pressure during exs.    PLAN/RECOMMENDATIONS:   Plan for treatment: therapy treatment to continue next visit.Planned interventions for next visit: continue with current treatment, E-stim unattended (CPT 48262), mechanical traction (CPT 94837) and therapeutic exercise (CPT 97331) Progress L/S stab exs w/ext bias.

## 2018-03-21 ENCOUNTER — PHYSICAL THERAPY (OUTPATIENT)
Dept: PHYSICAL THERAPY | Facility: REHABILITATION | Age: 63
End: 2018-03-21
Attending: FAMILY MEDICINE
Payer: MEDICAID

## 2018-03-21 DIAGNOSIS — M54.50 CHRONIC LOW BACK PAIN WITHOUT SCIATICA, UNSPECIFIED BACK PAIN LATERALITY: ICD-10-CM

## 2018-03-21 DIAGNOSIS — G89.29 CHRONIC LOW BACK PAIN WITHOUT SCIATICA, UNSPECIFIED BACK PAIN LATERALITY: ICD-10-CM

## 2018-03-21 PROCEDURE — 97110 THERAPEUTIC EXERCISES: CPT

## 2018-03-21 PROCEDURE — 97012 MECHANICAL TRACTION THERAPY: CPT

## 2018-03-22 NOTE — OP THERAPY DAILY TREATMENT
"  Outpatient Physical Therapy  DAILY TREATMENT     [facility] Physical Therapy [address]  [address]  [place] [address]  Phone:  [phone]  Fax:  [phone]    Date: 03/21/2018    Patient: Allen Mccabe  YOB: 1955  MRN: [id number]     Time Calculation  Start time: 1500  Stop time: 1550 Time Calculation (min): 50 minutes     Chief Complaint: Back Pain    Visit #: 3    SUBJECTIVE:  Pt reports slight relief after last session, but skeptical re: long term effects.    OBJECTIVE:    Therapeutic Exercises (CPT 57307):     1. Prone press on elbows , x10    2. Full prone press, x10    3. 3D pelvic tilts, x10 ea    4. SKFO w/cuff, x20 ea    5. Ball curls w/cuff, x30    6. Marching w/cuff, x20 ea    Therapeutic Treatments and Modalities:     1. Mechanical Traction (CPT 91516), L/S w/MHP, 90/50#, 60/20s, x15 min    Time-based treatments/modalities:  Therapeutic exercise minutes (CPT 97099): 30 minutes     Pain rating before treatment: 5  Pain rating after treatment: \"pretty good, too soon to tell\"    ASSESSMENT:   Pt skeptical, but focused on form/exs. Demos poor breathing habits.    PLAN/RECOMMENDATIONS:   Plan for treatment: therapy treatment to continue next visitProgress L/S stab exs w/ext bias, multifidi re-training, wall ball squats, ski jump, foam roller.      "

## 2018-03-26 ENCOUNTER — PHYSICAL THERAPY (OUTPATIENT)
Dept: PHYSICAL THERAPY | Facility: REHABILITATION | Age: 63
End: 2018-03-26
Attending: FAMILY MEDICINE
Payer: MEDICAID

## 2018-03-26 DIAGNOSIS — G89.29 CHRONIC LOW BACK PAIN WITHOUT SCIATICA, UNSPECIFIED BACK PAIN LATERALITY: ICD-10-CM

## 2018-03-26 DIAGNOSIS — M54.50 CHRONIC LOW BACK PAIN WITHOUT SCIATICA, UNSPECIFIED BACK PAIN LATERALITY: ICD-10-CM

## 2018-03-26 PROCEDURE — 97012 MECHANICAL TRACTION THERAPY: CPT

## 2018-03-26 PROCEDURE — 97110 THERAPEUTIC EXERCISES: CPT

## 2018-03-26 NOTE — OP THERAPY DAILY TREATMENT
"  Outpatient Physical Therapy  DAILY TREATMENT     [facility] Physical [address]  [address]  [place] [address]  Phone:  [phone]  Fax:  [phone]    Date: 03/26/2018    Patient: Allen Mccabe  YOB: 1955  MRN: [id number]     Time Calculation  Start time: 1000  Stop time: 1050 Time Calculation (min): 50 minutes     Chief Complaint: Back Pain    Visit #: 4    SUBJECTIVE:  Pt reports increased LBP that seemed to start sometime yesterday. Pt reports that this fluctuation is \"normal\". Pt is not sure how helpful traction is, he reports belt sliding pants down vs stretching.    OBJECTIVE:    Therapeutic Exercises (CPT 89440):     1. Prone lying, x5 min    2. Full prone press, x10    3. Wall ball squats, x20    4. Ski jump, 3x30 sec    5. Foam Roller, pec stretch, alt UE OH and lat    Therapeutic Treatments and Modalities:     1. Mechanical Traction (CPT 32834), L/S w/MHP, 90/50#, 60/20s, x15 min    Time-based treatments/modalities:  Therapeutic exercise minutes (CPT 96063): 30 minutes     Pain rating before treatment: 7  Pain rating after treatment: \"It's different, I'll know in an hour\"    ASSESSMENT:   Pt argumentative today, possibly related to increased pain level or other psychosocial issues. Pt w/improved quality and quantity of movement after ther ex. Cont to be very difficult to assess sx changes due to poor subjective feedback.    PLAN/RECOMMENDATIONS:   Plan for treatment: therapy treatment to continue next visit Progress L/S stab exs w/ext bias, multifidi re-training, and foam roller.      "

## 2018-03-28 ENCOUNTER — PHYSICAL THERAPY (OUTPATIENT)
Dept: PHYSICAL THERAPY | Facility: REHABILITATION | Age: 63
End: 2018-03-28
Attending: FAMILY MEDICINE
Payer: MEDICAID

## 2018-03-28 DIAGNOSIS — M54.50 CHRONIC LOW BACK PAIN WITHOUT SCIATICA, UNSPECIFIED BACK PAIN LATERALITY: ICD-10-CM

## 2018-03-28 DIAGNOSIS — G89.29 CHRONIC LOW BACK PAIN WITHOUT SCIATICA, UNSPECIFIED BACK PAIN LATERALITY: ICD-10-CM

## 2018-03-28 PROCEDURE — 97014 ELECTRIC STIMULATION THERAPY: CPT

## 2018-03-28 PROCEDURE — 97110 THERAPEUTIC EXERCISES: CPT

## 2018-03-28 NOTE — OP THERAPY DAILY TREATMENT
Outpatient Physical Therapy  DAILY TREATMENT     [facility] Physical [address]  [address]  [place] [address]  Phone:  [phone]  Fax:  [phone]    Date: 2018    Patient: Allen Mccabe  YOB: 1955  MRN: [id number]     Time Calculation  Start time: 0900  Stop time: 0950 Time Calculation (min): 50 minutes     Chief Complaint: Back Pain    Visit #: 5    SUBJECTIVE:  Pt reports back feels about the same, also notes general tightness in B LEs that seems more constant recently.    OBJECTIVE:    Therapeutic Exercises (CPT 47557):     1. Prone lying and press ups, x5 min    2. Foam Roller, pec stretch, alt UE OH and lat    3. Wall ball squats, x20    4. Ski jump, 3x30 sec    5. Multifidi alt UE w/pink TB, x20 ea    Therapeutic Treatments and Modalities:     1. Mechanical Traction (CPT 02067), L/S w/MHP, 90/50#, 60/20s, x15 min    Time-based treatments/modalities:  Therapeutic exercise minutes (CPT 42735): 30 minutes     Pain rating before treatment: 6-7  Pain rating after treatment: 6-7    ASSESSMENT:   Pt tolerates all ther ex w/o increased c/o LBP    PLAN/RECOMMENDATIONS:   Pt has two additional authorized visits however timeframe has . Pt would benefit from increased time to utilize his remaining two sessions in order to address L/S dynamic stability.

## 2018-04-09 RX ORDER — TRIAMTERENE AND HYDROCHLOROTHIAZIDE 37.5; 25 MG/1; MG/1
TABLET ORAL
Qty: 30 TAB | Refills: 6 | Status: SHIPPED | OUTPATIENT
Start: 2018-04-09 | End: 2018-11-07 | Stop reason: SDUPTHER

## 2018-04-10 ENCOUNTER — OFFICE VISIT (OUTPATIENT)
Dept: MEDICAL GROUP | Facility: MEDICAL CENTER | Age: 63
End: 2018-04-10
Attending: FAMILY MEDICINE
Payer: MEDICAID

## 2018-04-10 VITALS
BODY MASS INDEX: 33.59 KG/M2 | SYSTOLIC BLOOD PRESSURE: 118 MMHG | DIASTOLIC BLOOD PRESSURE: 60 MMHG | HEART RATE: 98 BPM | WEIGHT: 209 LBS | RESPIRATION RATE: 16 BRPM | HEIGHT: 66 IN | OXYGEN SATURATION: 95 % | TEMPERATURE: 97.3 F

## 2018-04-10 DIAGNOSIS — G89.29 CHRONIC LOW BACK PAIN WITHOUT SCIATICA, UNSPECIFIED BACK PAIN LATERALITY: ICD-10-CM

## 2018-04-10 DIAGNOSIS — N30.10 INTERSTITIAL CYSTITIS: ICD-10-CM

## 2018-04-10 DIAGNOSIS — R10.2 PELVIC PAIN: ICD-10-CM

## 2018-04-10 DIAGNOSIS — G89.4 CHRONIC PAIN SYNDROME: ICD-10-CM

## 2018-04-10 DIAGNOSIS — M54.50 CHRONIC LOW BACK PAIN WITHOUT SCIATICA, UNSPECIFIED BACK PAIN LATERALITY: ICD-10-CM

## 2018-04-10 PROCEDURE — 99213 OFFICE O/P EST LOW 20 MIN: CPT | Performed by: FAMILY MEDICINE

## 2018-04-10 RX ORDER — HYDROCODONE BITARTRATE AND ACETAMINOPHEN 10; 325 MG/1; MG/1
TABLET ORAL
Qty: 120 TAB | Refills: 0 | Status: SHIPPED | OUTPATIENT
Start: 2018-06-10 | End: 2018-07-09 | Stop reason: SDUPTHER

## 2018-04-10 RX ORDER — HYDROMORPHONE HYDROCHLORIDE 4 MG/1
4 TABLET ORAL EVERY 6 HOURS PRN
Qty: 45 TAB | Refills: 0 | Status: SHIPPED | OUTPATIENT
Start: 2018-04-10 | End: 2018-11-13 | Stop reason: SDUPTHER

## 2018-04-10 RX ORDER — HYDROCODONE BITARTRATE AND ACETAMINOPHEN 10; 325 MG/1; MG/1
TABLET ORAL
Qty: 120 TAB | Refills: 0 | Status: SHIPPED | OUTPATIENT
Start: 2018-05-10 | End: 2018-04-10 | Stop reason: SDUPTHER

## 2018-04-10 RX ORDER — LIDOCAINE 50 MG/G
2-4 OINTMENT TOPICAL PRN
Qty: 1 TUBE | Refills: 6 | Status: SHIPPED | OUTPATIENT
Start: 2018-04-10 | End: 2018-07-26 | Stop reason: SDUPTHER

## 2018-04-10 RX ORDER — HYDROCODONE BITARTRATE AND ACETAMINOPHEN 10; 325 MG/1; MG/1
TABLET ORAL
Qty: 120 TAB | Refills: 0 | Status: SHIPPED | OUTPATIENT
Start: 2018-04-10 | End: 2018-04-10 | Stop reason: SDUPTHER

## 2018-04-10 ASSESSMENT — PAIN SCALES - GENERAL: PAINLEVEL: 5=MODERATE PAIN

## 2018-04-10 NOTE — PROGRESS NOTES
Subjective:      Allen Mccabe is a 63 y.o. male who presents with No chief complaint on file.            1.    1. Chronic pain-patient reports occasional low anterior pelvic pain consistent with his interstitial cystitis. More consistently however on a daily basis he is experiencing pain at level 6/10 in his lumbosacral midline just to the left of midline. The pain typically does not radiate to either lower extremity currently. He did complete 4 of 6 authorized physical therapy visits, got a late start due to late notification of approval. That was modestly helpful. He is still doing his daily stretches and exercises but reports that benefits seemed to wane after 15-30 minutes. Has noticed recently that he will defecate up to 4 times each morning to feel completely empty but is not having any fecal incontinence.    ROS negative for hematuria, constipation, abdominal loading       Objective:     Vitals: Temperature 97.3°F, /60, pulse 98, respirations 16, O2 saturation 95%, height 5 feet 6 inches, weight 209 pounds    Physical Exam  Gen.- alert, cooperative, in no acute distress  Neck- midline trachea, thyroid not enlarged or tender,supple, no cervical adenopathy  Chest-clear to auscultation and percussion with normal breath sounds. No retractions. Chest wall nontender  Cardiac- regular rhythm and rate. No murmur, thrill, or heave  Back-1+ tender just to the left of midline at L5-S1, nontender elsewhere          Assessment/Plan:     1. Interstitial cystitis    - HYDROcodone/acetaminophen (NORCO)  MG Tab; TAKE 1 TABLET ORALLY EVERY 6 HOURS IF NEEDED  Dispense: 120 Tab; Refill: 0  - CONSENT FOR OPIATE PRESCRIPTION    2. Chronic pain syndrome    - HYDROcodone/acetaminophen (NORCO)  MG Tab; TAKE 1 TABLET ORALLY EVERY 6 HOURS IF NEEDED  Dispense: 120 Tab; Refill: 0  - CONSENT FOR OPIATE PRESCRIPTION    3. Chronic low back pain without sciatica, unspecified back pain laterality    - CONSENT FOR  OPIATE PRESCRIPTION    4. Pelvic pain    - HYDROmorphone (DILAUDID) 4 MG Tab; Take 1 Tab by mouth every 6 hours as needed for up to 90 days.  Dispense: 45 Tab; Refill: 0    Plan: 1. Renew Norco 10/325 up to 4 times daily  2. Renew Dilaudid for mg #45 for occasional more severe breakthrough pain, to last 3 months  3. Trial of lidocaine 5% ointment to the lower back pain focus

## 2018-06-05 ENCOUNTER — TELEPHONE (OUTPATIENT)
Dept: PHYSICAL THERAPY | Facility: REHABILITATION | Age: 63
End: 2018-06-05

## 2018-06-05 NOTE — OP THERAPY DISCHARGE SUMMARY
Outpatient Physical Therapy  DISCHARGE SUMMARY NOTE      [facility] Therapy [address]  [address]  [place] [address]  Phone:  [phone]  Fax:  [phone]    Date of Visit: 2018    Patient: Allen Mccabe  YOB: 1955  MRN: [id number]     Referring Provider: Michel Triana M.D.   Referring Diagnosis Chronic bilateral low back pain without sciatica [M54.5, G89.29]     Physical Therapy Occurrence Codes    Date of onset of impairment:  13   Date physical therapy care plan established or reviewed:  18        Your patient is being discharged from Physical Therapy with the following comments:   · Goals partially met    Comments:  Pt responded to extension biased ther ex. Increased repetition of HEP over a longer period of time would be required to make more long-lasting changes.    Recommendations:  D/C, authorization has  and additional referrals were not made.    Eun Brannon, PT, DPT    Date: 2018

## 2018-06-26 ENCOUNTER — PATIENT MESSAGE (OUTPATIENT)
Dept: MEDICAL GROUP | Facility: MEDICAL CENTER | Age: 63
End: 2018-06-26

## 2018-06-26 DIAGNOSIS — G89.29 CHRONIC LOW BACK PAIN WITHOUT SCIATICA, UNSPECIFIED BACK PAIN LATERALITY: ICD-10-CM

## 2018-06-26 DIAGNOSIS — M54.50 CHRONIC LOW BACK PAIN WITHOUT SCIATICA, UNSPECIFIED BACK PAIN LATERALITY: ICD-10-CM

## 2018-06-26 DIAGNOSIS — N30.10 INTERSTITIAL CYSTITIS: ICD-10-CM

## 2018-06-28 ENCOUNTER — TELEPHONE (OUTPATIENT)
Dept: MEDICAL GROUP | Facility: MEDICAL CENTER | Age: 63
End: 2018-06-28

## 2018-06-28 DIAGNOSIS — R10.2 PELVIC PAIN: ICD-10-CM

## 2018-06-28 RX ORDER — HYDROMORPHONE HYDROCHLORIDE 2 MG/1
2-4 TABLET ORAL EVERY 6 HOURS PRN
Qty: 60 TAB | Refills: 0 | Status: SHIPPED | OUTPATIENT
Start: 2018-06-28 | End: 2018-08-27 | Stop reason: SDUPTHER

## 2018-06-28 NOTE — TELEPHONE ENCOUNTER
1. Caller Name: Allen                                         Call Back Number: [phone] (home)         Patient approves a detailed voicemail message: yes    Pt is requesting to decrease his dilaudid to 2mg from 4mg as he feels that he would be better controlled on a lower dose. Please advise. Pt requesting 90 tabs per month.

## 2018-07-09 ENCOUNTER — HOSPITAL ENCOUNTER (OUTPATIENT)
Facility: MEDICAL CENTER | Age: 63
End: 2018-07-09
Attending: FAMILY MEDICINE
Payer: MEDICAID

## 2018-07-09 ENCOUNTER — OFFICE VISIT (OUTPATIENT)
Dept: MEDICAL GROUP | Facility: MEDICAL CENTER | Age: 63
End: 2018-07-09
Attending: FAMILY MEDICINE
Payer: MEDICAID

## 2018-07-09 VITALS
RESPIRATION RATE: 16 BRPM | HEIGHT: 66 IN | OXYGEN SATURATION: 96 % | DIASTOLIC BLOOD PRESSURE: 70 MMHG | TEMPERATURE: 97.6 F | SYSTOLIC BLOOD PRESSURE: 112 MMHG | BODY MASS INDEX: 33.43 KG/M2 | HEART RATE: 84 BPM | WEIGHT: 208 LBS

## 2018-07-09 DIAGNOSIS — G89.29 CHRONIC LOW BACK PAIN WITHOUT SCIATICA, UNSPECIFIED BACK PAIN LATERALITY: ICD-10-CM

## 2018-07-09 DIAGNOSIS — G89.4 CHRONIC PAIN SYNDROME: ICD-10-CM

## 2018-07-09 DIAGNOSIS — N30.10 INTERSTITIAL CYSTITIS: ICD-10-CM

## 2018-07-09 DIAGNOSIS — M54.50 CHRONIC LOW BACK PAIN WITHOUT SCIATICA, UNSPECIFIED BACK PAIN LATERALITY: ICD-10-CM

## 2018-07-09 DIAGNOSIS — I10 ESSENTIAL HYPERTENSION: ICD-10-CM

## 2018-07-09 DIAGNOSIS — E66.9 OBESITY (BMI 30-39.9): ICD-10-CM

## 2018-07-09 PROCEDURE — 80307 DRUG TEST PRSMV CHEM ANLYZR: CPT

## 2018-07-09 PROCEDURE — 99214 OFFICE O/P EST MOD 30 MIN: CPT | Performed by: FAMILY MEDICINE

## 2018-07-09 PROCEDURE — 99212 OFFICE O/P EST SF 10 MIN: CPT | Performed by: FAMILY MEDICINE

## 2018-07-09 RX ORDER — HYDROCODONE BITARTRATE AND ACETAMINOPHEN 10; 325 MG/1; MG/1
TABLET ORAL
Qty: 120 TAB | Refills: 0 | Status: SHIPPED | OUTPATIENT
Start: 2018-07-09 | End: 2018-07-09 | Stop reason: SDUPTHER

## 2018-07-09 RX ORDER — HYDROCODONE BITARTRATE AND ACETAMINOPHEN 10; 325 MG/1; MG/1
TABLET ORAL
Qty: 120 TAB | Refills: 0 | Status: SHIPPED | OUTPATIENT
Start: 2018-09-05 | End: 2018-10-02 | Stop reason: SDUPTHER

## 2018-07-09 RX ORDER — HYDROCODONE BITARTRATE AND ACETAMINOPHEN 10; 325 MG/1; MG/1
TABLET ORAL
Qty: 120 TAB | Refills: 0 | Status: SHIPPED | OUTPATIENT
Start: 2018-08-06 | End: 2018-07-09 | Stop reason: SDUPTHER

## 2018-07-09 ASSESSMENT — PATIENT HEALTH QUESTIONNAIRE - PHQ9: CLINICAL INTERPRETATION OF PHQ2 SCORE: 0

## 2018-07-09 ASSESSMENT — PAIN SCALES - GENERAL: PAINLEVEL: 7=MODERATE-SEVERE PAIN

## 2018-07-09 NOTE — ASSESSMENT & PLAN NOTE
Patient is experiencing intermittent episodes lasting 3-5 days of acute flares of bilateral lumbar back pain. Most recent flare began 3 days ago with no obvious overuse. Patient denies radiation of pain into either lower extremity. There is no current constipation. During these times patient will use 2 mg Dilaudid 1 or 2 tablets per day on top of the usual 4 times a day Norco dose.

## 2018-07-09 NOTE — ASSESSMENT & PLAN NOTE
Patient is compliant taking diltiazem, Maxide, and terazosin. Not reporting chest pain, chest pressure, palpitations

## 2018-07-09 NOTE — PROGRESS NOTES
Chief Complaint   Patient presents with   • Back Pain       HISTORY OF PRESENT ILLNESS: Patient is a 63 y.o. male established patient who presents today to follow-up on recurrent low back pain, hypertension, interstitial cystitis        Chronic low back pain without sciatica  Patient is experiencing intermittent episodes lasting 3-5 days of acute flares of bilateral lumbar back pain. Most recent flare began 3 days ago with no obvious overuse. Patient denies radiation of pain into either lower extremity. There is no current constipation. During these times patient will use 2 mg Dilaudid 1 or 2 tablets per day on top of the usual 4 times a day Norco dose.    Hypertension  Patient is compliant taking diltiazem, Maxide, and terazosin. Not reporting chest pain, chest pressure, palpitations    Interstitial cystitis  Patient reports continued daily low anterior pelvic discomfort. Not reporting any hematuria. He is no longer taking Elmiron due to lack of effect. Denies urinary incontinence     Social history-single, not currently working  Patient Active Problem List    Diagnosis Date Noted   • Chronic low back pain without sciatica 04/10/2018   • Left anterior knee pain 08/22/2017   • Obesity (BMI 30-39.9) 10/03/2016   • Neck pain of over 3 months duration 01/21/2016   • Dermatitis 12/24/2015   • Lumbar muscle pain 10/27/2015   • Drug dermatitis 05/18/2015   • Thoracic myofascial strain 04/28/2015   • Chronic pharyngitis 04/01/2015   • Esophageal reflux 04/01/2015   • Dysphagia 04/01/2015   • Recurrent cough 02/06/2015   • Paresthesia of right leg 02/06/2015   • Obesity 12/29/2014   • Skin lesion of back 12/29/2014   • Hypertension 06/29/2010   • Hyperlipidemia 06/11/2010   • Chronic pain syndrome 06/15/2009   • Interstitial cystitis    • Headache    • Sprain of neck        Allergies:Nkda [no known drug allergy] and Zofran [dextrose-ondansetron]    Current Outpatient Prescriptions   Medication Sig Dispense Refill   • [START  ON 9/5/2018] HYDROcodone/acetaminophen (NORCO)  MG Tab TAKE 1 TABLET ORALLY EVERY 6 HOURS IF NEEDED 120 Tab 0   • HYDROmorphone (DILAUDID) 2 MG Tab Take 1-2 Tabs by mouth every 6 hours as needed for Severe Pain for up to 60 days. 60 Tab 0   • HYDROmorphone (DILAUDID) 4 MG Tab Take 1 Tab by mouth every 6 hours as needed for up to 90 days. 45 Tab 0   • lidocaine (XYLOCAINE) 5 % Ointment Apply 2-4 g to affected area(s) as needed. 1 Tube 6   • triamterene-hctz (MAXZIDE-25/DYAZIDE) 37.5-25 MG Tab TAKE 1 TABLET ORALLY ONCE DAILY 30 Tab 6   • lidocaine (LIDODERM) 5 % Patch Apply 1 Patch to skin as directed every 24 hours. 30 Patch 6   • betamethasone dipropionate (DIPROLENE) 0.05 % Ointment Apply thinly twice daily as needed 30 g 1   • diltiazem CD (CARDIZEM CD) 300 MG CAPSULE SR 24 HR TAKE ONE CAPSULE BY MOUTH ONCE DAILY. 30 Cap 11   • halobetasol (ULTRAVATE) 0.05 % ointment Apply thinly twice daily 1 Tube 6   • terazosin (HYTRIN) 5 MG Cap TAKE ONE CAPSULE BY MOUTH ONCE DAILY 30 Cap 11   • promethazine-codeine (PHENERGAN-CODEINE) 6.25-10 MG/5ML Syrup Take 5-10 mL by mouth 4 times a day as needed for Cough. 240 mL 0   • clobetasol (TEMOVATE) 0.05 % Ointment Apply 1 Application to affected area(s) 2 times a day. 60 g 0   • methocarbamol (ROBAXIN) 500 MG Tab Take 2 Tabs by mouth 3 times a day. 120 Tab 11   • augmented betamethasone dipropionate (DIPROLENE-AF) 0.05 % ointment Apply 1 Application to affected area(s) 2 times a day. 30 g 3   • lidocaine (LIDODERM) 5 % Patch APPLY 1 PATCH TO SKIN AS DIRECTED EVERY 24 HOURS. 30 Patch 3   • fluocinonide (LIDEX) 0.05 % Cream Apply 1 Application to affected area(s) 2 times a day. 45 g 1   • triamcinolone acetonide (KENALOG) 0.1 % Ointment Apply 1 Application to affected area(s) 2 times a day. 60 g 1   • Aug Betamethasone Dipropionate (DIPROLENE-AF) 0.05 % Cream Apply 1 g to affected area(s) 2 times a day. 60 g 1   • pentosan (ELMIRON) 100 MG Cap Take 100 mg by mouth 3 times a  "day before meals.     • ranitidine (ZANTAC) 150 MG Tab Take 300 mg by mouth as needed for Heartburn.     • diltiazem CD (CARTIA XT) 300 MG CAPSULE SR 24 HR Take 1 Cap by mouth every day. 30 Cap 6   • omeprazole (PRILOSEC) 20 MG delayed-release capsule Take 20 mg by mouth 2 times a day.       No current facility-administered medications for this visit.        Social History   Substance Use Topics   • Smoking status: Never Smoker   • Smokeless tobacco: Never Used   • Alcohol use 0.0 oz/week      Comment: rare       Family History   Problem Relation Age of Onset   • Cancer Mother      uterus   • Cancer Father      lung   • Heart Disease Brother      half brother   • Diabetes Neg Hx    • Stroke Neg Hx        ROS:  Review of Systems   Constitutional: Negative for fever, chills, weight loss and malaise/fatigue.   Eyes: Negative for blurred vision.   Respiratory: Negative for cough, sputum production, shortness of breath and wheezing.    Cardiovascular: Negative for chest pain, palpitations, orthopnea and leg swelling.   Gastrointestinal: Negative for heartburn, nausea, vomiting and abdominal pain.    Endo/Heme/Allergies: Does not bruise/bleed easily.               Exam:  Blood pressure 112/70, pulse 84, temperature 36.4 °C (97.6 °F), resp. rate 16, height 1.676 m (5' 5.98\"), weight 94.3 kg (208 lb), SpO2 96 %.  General:  Well nourished, well developed male in NAD  Head is grossly normal.  Neck: Supple without JVD or bruit. Thyroid is not enlarged. Trachea is midline.  Pulmonary: Clear to ausculation .  Normal effort. No rales, ronchi, or wheezing.  Cardiovascular: Regular rate and rhythm without murmur.  Abdomen-Abdomen is soft, normal bowel sounds, no masses, guarding, ororganomegaly, with mild tenderness in the right and left lower quadrant, no rebound  Lower extremities- neg for pretibial edema, redness, tenderness.   Back-1+ tender in the midline from L4-S4 and the hair of spinous areas from L4-S4 " bilaterally    Please note that this dictation was created using voice recognition software. I have made every reasonable attempt to correct obvious errors, but I expect that there are errors of grammar and possibly content that I did not discover before finalizing the note.    Assessment/Plan:  1. Obesity (BMI 30-39.9)  Patient identified as having weight management issue.  Appropriate orders and counseling given.   2. Essential hypertension     3. Chronic pain syndrome  HYDROcodone/acetaminophen (NORCO)  MG Tab    DISCONTINUED: HYDROcodone/acetaminophen (NORCO)  MG Tab    DISCONTINUED: HYDROcodone/acetaminophen (NORCO)  MG Tab    DISCONTINUED: HYDROcodone/acetaminophen (NORCO)  MG Tab   4. Interstitial cystitis  HYDROcodone/acetaminophen (NORCO)  MG Tab    DISCONTINUED: HYDROcodone/acetaminophen (NORCO)  MG Tab    DISCONTINUED: HYDROcodone/acetaminophen (NORCO)  MG Tab    DISCONTINUED: HYDROcodone/acetaminophen (NORCO)  MG Tab   5. Chronic low back pain without sciatica, unspecified back pain laterality        Plan: 1. We'll continue Norco 10/325 4 times daily, 3 prescriptions written today  2. Continue intermittent sparing use of Dilaudid 2-4 mg for breakthrough more severe pain  3. Update UDS today  4. Patient is making a concerted effort to limit portions and lose weight, currently has lost about 17 pounds in the past 6 months.  5. Revisit in 3 months

## 2018-07-10 DIAGNOSIS — G89.4 CHRONIC PAIN SYNDROME: ICD-10-CM

## 2018-07-12 LAB

## 2018-07-13 ENCOUNTER — TELEPHONE (OUTPATIENT)
Dept: MEDICAL GROUP | Facility: MEDICAL CENTER | Age: 63
End: 2018-07-13

## 2018-07-14 NOTE — TELEPHONE ENCOUNTER
----- Message from Michel Triana M.D. sent at 7/12/2018  1:42 PM PDT -----  When did patient take his last Norco or Dilaudid prior to his urine sample provided on Tuesday 7/10/18?

## 2018-07-26 RX ORDER — LIDOCAINE 50 MG/G
OINTMENT TOPICAL
Qty: 1 TUBE | Refills: 5 | Status: SHIPPED | OUTPATIENT
Start: 2018-07-26 | End: 2019-01-10 | Stop reason: SDUPTHER

## 2018-08-27 DIAGNOSIS — M54.50 CHRONIC LOW BACK PAIN WITHOUT SCIATICA, UNSPECIFIED BACK PAIN LATERALITY: ICD-10-CM

## 2018-08-27 DIAGNOSIS — G89.29 CHRONIC LOW BACK PAIN WITHOUT SCIATICA, UNSPECIFIED BACK PAIN LATERALITY: ICD-10-CM

## 2018-08-27 DIAGNOSIS — N30.10 INTERSTITIAL CYSTITIS: ICD-10-CM

## 2018-08-30 RX ORDER — HYDROMORPHONE HYDROCHLORIDE 2 MG/1
2-4 TABLET ORAL EVERY 6 HOURS PRN
Qty: 60 TAB | Refills: 0 | Status: SHIPPED | OUTPATIENT
Start: 2018-08-30 | End: 2018-10-29

## 2018-10-02 ENCOUNTER — OFFICE VISIT (OUTPATIENT)
Dept: MEDICAL GROUP | Facility: MEDICAL CENTER | Age: 63
End: 2018-10-02
Attending: FAMILY MEDICINE
Payer: MEDICAID

## 2018-10-02 VITALS
BODY MASS INDEX: 34.23 KG/M2 | RESPIRATION RATE: 16 BRPM | DIASTOLIC BLOOD PRESSURE: 60 MMHG | WEIGHT: 213 LBS | HEART RATE: 88 BPM | TEMPERATURE: 97.9 F | OXYGEN SATURATION: 95 % | HEIGHT: 66 IN | SYSTOLIC BLOOD PRESSURE: 112 MMHG

## 2018-10-02 DIAGNOSIS — R05.9 COUGH: ICD-10-CM

## 2018-10-02 DIAGNOSIS — L30.9 DERMATITIS: ICD-10-CM

## 2018-10-02 DIAGNOSIS — N30.10 INTERSTITIAL CYSTITIS: ICD-10-CM

## 2018-10-02 DIAGNOSIS — G89.4 CHRONIC PAIN SYNDROME: ICD-10-CM

## 2018-10-02 PROCEDURE — 99213 OFFICE O/P EST LOW 20 MIN: CPT | Performed by: FAMILY MEDICINE

## 2018-10-02 PROCEDURE — 99212 OFFICE O/P EST SF 10 MIN: CPT | Performed by: FAMILY MEDICINE

## 2018-10-02 RX ORDER — PROMETHAZINE HYDROCHLORIDE AND CODEINE PHOSPHATE 6.25; 1 MG/5ML; MG/5ML
5 SYRUP ORAL 4 TIMES DAILY PRN
Qty: 480 ML | Refills: 0 | Status: SHIPPED | OUTPATIENT
Start: 2018-10-02 | End: 2018-10-25 | Stop reason: SDUPTHER

## 2018-10-02 RX ORDER — HYDROCODONE BITARTRATE AND ACETAMINOPHEN 10; 325 MG/1; MG/1
TABLET ORAL
Qty: 120 TAB | Refills: 0 | Status: SHIPPED | OUTPATIENT
Start: 2018-10-02 | End: 2018-10-02 | Stop reason: SDUPTHER

## 2018-10-02 RX ORDER — HYDROCODONE BITARTRATE AND ACETAMINOPHEN 10; 325 MG/1; MG/1
TABLET ORAL
Qty: 120 TAB | Refills: 0 | Status: SHIPPED | OUTPATIENT
Start: 2018-12-02 | End: 2018-12-28 | Stop reason: SDUPTHER

## 2018-10-02 RX ORDER — BETAMETHASONE DIPROPIONATE 0.05 %
OINTMENT (GRAM) TOPICAL
Qty: 45 G | Refills: 0 | Status: SHIPPED | OUTPATIENT
Start: 2018-10-02 | End: 2019-10-22 | Stop reason: SDUPTHER

## 2018-10-02 RX ORDER — HYDROCODONE BITARTRATE AND ACETAMINOPHEN 10; 325 MG/1; MG/1
TABLET ORAL
Qty: 120 TAB | Refills: 0 | Status: SHIPPED | OUTPATIENT
Start: 2018-11-02 | End: 2018-10-02 | Stop reason: SDUPTHER

## 2018-10-02 ASSESSMENT — PAIN SCALES - GENERAL: PAINLEVEL: 6=MODERATE PAIN

## 2018-10-02 NOTE — PROGRESS NOTES
"Subjective:      Allen Mccabe is a 63 y.o. male who presents with Back Pain            HPI 1. Chronic pain syndrome-patient continues to take Norco 10/325 4 times daily with an occasional Dilaudid 2 mg tablet for breakthrough pain for pain generators coming from his suprapubic area (bladder), and low back. Low back pain does not radiate in a sciatica type pattern. He is not reporting any recent hematuria. Patient reports pain level will rise up to 7-8 out of 10 on occasion.  2. Acute URI-patient notes a minimally productive cough along with sore throat over the past 4-5 days.    ROSneg for fever, ear pain, nausea       Objective:     /60 (BP Location: Left arm, Patient Position: Sitting, BP Cuff Size: Large adult)   Pulse 88   Temp 36.6 °C (97.9 °F) (Temporal)   Resp 16   Ht 1.676 m (5' 5.98\")   Wt 96.6 kg (213 lb)   SpO2 95%   BMI 34.40 kg/m²      Physical Exam  General- alert,cooperative patient in no acute distress  Ears- normal tms without redness, perforation. Canals unremarkable  Nares- clear, pink, moist mucosa without bleeding. No purulent nasal DC  Orophx.- lips normal. Clear, pink, moist mucosa without redness or exudate. Tongue is midline  Chest-Normal to auscultation and percussion, movement is symmetric. Nontender to palpation.              Assessment/Plan:     1. Interstitial cystitis    - HYDROcodone/acetaminophen (NORCO)  MG Tab; TAKE 1 TABLET ORALLY EVERY 6 HOURS IF NEEDED  Dispense: 120 Tab; Refill: 0    2. Chronic pain syndrome    - HYDROcodone/acetaminophen (NORCO)  MG Tab; TAKE 1 TABLET ORALLY EVERY 6 HOURS IF NEEDED  Dispense: 120 Tab; Refill: 0    3. Cough    - promethazine-codeine (PHENERGAN-CODEINE) 6.25-10 MG/5ML Syrup; Take 5 mL by mouth 4 times a day as needed for Cough for up to 90 days.  Dispense: 480 mL; Refill: 0    Plan: 1. Renew Norco 10/325 for use up to 4 times daily, ×3 months  2. Renew Phenergan with codeine cough syrup  3. Patient will likely " need a refill of the Dilaudid and another 2-4 weeks.  4. Update UDS today (patient reports that he believes he had at least 2 doses of Norco in the 24 hours prior to the last UDS. Reports that he had 4 doses of Norco in the past 24 hours.

## 2018-10-05 ENCOUNTER — HOSPITAL ENCOUNTER (OUTPATIENT)
Dept: LAB | Facility: MEDICAL CENTER | Age: 63
End: 2018-10-05
Attending: FAMILY MEDICINE
Payer: MEDICAID

## 2018-10-05 LAB
FORWARD REASON: SPWHY: NORMAL
FORWARDED TO LAB: SPWHR: NORMAL
SPECIMEN SENT: SPWT1: NORMAL

## 2018-10-17 DIAGNOSIS — R05.9 COUGH: ICD-10-CM

## 2018-10-18 ENCOUNTER — PATIENT MESSAGE (OUTPATIENT)
Dept: MEDICAL GROUP | Facility: MEDICAL CENTER | Age: 63
End: 2018-10-18

## 2018-10-18 RX ORDER — PROMETHAZINE HYDROCHLORIDE AND CODEINE PHOSPHATE 6.25; 1 MG/5ML; MG/5ML
5 SYRUP ORAL 4 TIMES DAILY PRN
Qty: 480 ML | Refills: 0 | OUTPATIENT
Start: 2018-10-18 | End: 2019-01-16

## 2018-10-22 ENCOUNTER — PATIENT MESSAGE (OUTPATIENT)
Dept: MEDICAL GROUP | Facility: MEDICAL CENTER | Age: 63
End: 2018-10-22

## 2018-10-22 DIAGNOSIS — R05.9 COUGH: ICD-10-CM

## 2018-10-25 RX ORDER — PROMETHAZINE HYDROCHLORIDE AND CODEINE PHOSPHATE 6.25; 1 MG/5ML; MG/5ML
5 SYRUP ORAL 4 TIMES DAILY PRN
Qty: 480 ML | Refills: 0 | Status: SHIPPED | OUTPATIENT
Start: 2018-10-25 | End: 2019-03-19 | Stop reason: SDUPTHER

## 2018-10-25 NOTE — TELEPHONE ENCOUNTER
From: Allen Mccabe  To: Michel Triana M.D.  Sent: 10/22/2018 7:26 PM PDT  Subject: Prescription Question    Doc:  By my calculation the prescription would last for 24 days. It is now day 22 since it has been filled. I ran out yesterday and continue to hack mostly at night(trying to go to sleep) and in the morning. I sent the request on 10/19 not thinking you would get to it so soon but knowing I would be running out during the weekend. I sent a new refill request this evening before you sent message. If you can fill great if not, ok. If cough persists much longer I will come in to see you.   allen hou

## 2018-11-08 RX ORDER — TRIAMTERENE AND HYDROCHLOROTHIAZIDE 37.5; 25 MG/1; MG/1
TABLET ORAL
Qty: 30 TAB | Refills: 6 | Status: SHIPPED | OUTPATIENT
Start: 2018-11-08 | End: 2019-06-06 | Stop reason: SDUPTHER

## 2018-11-13 DIAGNOSIS — R10.2 PELVIC PAIN: ICD-10-CM

## 2018-11-13 RX ORDER — HYDROMORPHONE HYDROCHLORIDE 4 MG/1
4 TABLET ORAL EVERY 6 HOURS PRN
Qty: 45 TAB | Refills: 0 | Status: SHIPPED | OUTPATIENT
Start: 2018-11-13 | End: 2019-01-29 | Stop reason: SDUPTHER

## 2018-12-28 ENCOUNTER — OFFICE VISIT (OUTPATIENT)
Dept: MEDICAL GROUP | Facility: MEDICAL CENTER | Age: 63
End: 2018-12-28
Attending: FAMILY MEDICINE
Payer: MEDICAID

## 2018-12-28 VITALS
BODY MASS INDEX: 35.84 KG/M2 | RESPIRATION RATE: 16 BRPM | TEMPERATURE: 97.7 F | HEART RATE: 76 BPM | WEIGHT: 223 LBS | HEIGHT: 66 IN | OXYGEN SATURATION: 95 % | SYSTOLIC BLOOD PRESSURE: 122 MMHG | DIASTOLIC BLOOD PRESSURE: 60 MMHG

## 2018-12-28 DIAGNOSIS — N30.10 INTERSTITIAL CYSTITIS: ICD-10-CM

## 2018-12-28 DIAGNOSIS — G89.4 CHRONIC PAIN SYNDROME: ICD-10-CM

## 2018-12-28 DIAGNOSIS — R51.9 RECURRENT OCCIPITAL HEADACHE: ICD-10-CM

## 2018-12-28 PROCEDURE — 99213 OFFICE O/P EST LOW 20 MIN: CPT | Performed by: FAMILY MEDICINE

## 2018-12-28 RX ORDER — CYCLOBENZAPRINE HCL 10 MG
10 TABLET ORAL 3 TIMES DAILY PRN
Qty: 45 TAB | Refills: 1 | Status: SHIPPED | OUTPATIENT
Start: 2018-12-28 | End: 2023-04-07

## 2018-12-28 RX ORDER — HYDROCODONE BITARTRATE AND ACETAMINOPHEN 10; 325 MG/1; MG/1
TABLET ORAL
Qty: 120 TAB | Refills: 0 | Status: SHIPPED | OUTPATIENT
Start: 2018-12-28 | End: 2019-01-17 | Stop reason: SDUPTHER

## 2018-12-28 RX ORDER — MELOXICAM 15 MG/1
15 TABLET ORAL DAILY
Qty: 30 TAB | Refills: 1 | Status: SHIPPED | OUTPATIENT
Start: 2018-12-28 | End: 2019-07-12

## 2018-12-28 ASSESSMENT — PAIN SCALES - GENERAL: PAINLEVEL: 6=MODERATE PAIN

## 2018-12-28 NOTE — PROGRESS NOTES
"Subjective:      Allen Mccabe is a 63 y.o. male who presents with No chief complaint on file.            HPI 1.  Occipital headache-patient reports a persistent mild level headache bilaterally over the posterior aspect of his head over the past month.  Also notes about the same time onset of a persistent lower right posterior neck ache.  Patient has had previous 2 level fusion.  MRI from 2016 showed significant arthritic change in his neck with at that time some left-sided foraminal stenosis.  Patient reports that the posterior headache will at times radiate around to the front.  There is been no associated visual change, nausea      ROS negative for recent fever, diaphoresis, double vision, tinnitus, slurred speech, focal weakness       Objective:     /60 (BP Location: Left arm, Patient Position: Sitting, BP Cuff Size: Large adult)   Pulse 76   Temp 36.5 °C (97.7 °F) (Temporal)   Resp 16   Ht 1.676 m (5' 5.98\")   Wt 101.2 kg (223 lb)   SpO2 95%   BMI 36.01 kg/m²      Physical Exam  Gen.- alert, cooperative, in no acute distress  Neck- midline trachea, thyroid not enlarged or tender,supple, no cervical adenopathy.  Point tender over the lower right posterior aspect  Chest-clear to auscultation and percussion with normal breath sounds. No retractions. Chest wall nontender  Cardiac- regular rhythm and rate. No murmur, thrill, or heave  Upper extremities-intact light touch, intact strength.   Ears-normal TMs and canals bilaterally  Eyes-fundi show flat disks and normal-appearing vessels bilaterally       Assessment/Plan:     1. Interstitial cystitis    - HYDROcodone/acetaminophen (NORCO)  MG Tab; TAKE 1 TABLET ORALLY EVERY 6 HOURS IF NEEDED  Dispense: 120 Tab; Refill: 0    2. Chronic pain syndrome    - HYDROcodone/acetaminophen (NORCO)  MG Tab; TAKE 1 TABLET ORALLY EVERY 6 HOURS IF NEEDED  Dispense: 120 Tab; Refill: 0    3. Recurrent occipital headache-suspect significant cervical " degenerative arthritis component    Plan: 1.  Renew Norco 10/325 #120-chronic use  2.  Restart Flexeril 10 mg up to 3 times daily as needed  3.  Restart Mobic 15 mg once daily with food-GI precautions reviewed  4.  Revisit in 1 month

## 2019-01-17 ENCOUNTER — OFFICE VISIT (OUTPATIENT)
Dept: MEDICAL GROUP | Facility: MEDICAL CENTER | Age: 64
End: 2019-01-17
Attending: FAMILY MEDICINE
Payer: MEDICAID

## 2019-01-17 VITALS
WEIGHT: 219 LBS | HEART RATE: 88 BPM | TEMPERATURE: 97.9 F | BODY MASS INDEX: 35.2 KG/M2 | SYSTOLIC BLOOD PRESSURE: 118 MMHG | RESPIRATION RATE: 16 BRPM | HEIGHT: 66 IN | OXYGEN SATURATION: 94 % | DIASTOLIC BLOOD PRESSURE: 62 MMHG

## 2019-01-17 DIAGNOSIS — G89.4 CHRONIC PAIN SYNDROME: ICD-10-CM

## 2019-01-17 DIAGNOSIS — N30.10 INTERSTITIAL CYSTITIS: ICD-10-CM

## 2019-01-17 PROCEDURE — 99214 OFFICE O/P EST MOD 30 MIN: CPT | Performed by: FAMILY MEDICINE

## 2019-01-17 PROCEDURE — 99213 OFFICE O/P EST LOW 20 MIN: CPT | Performed by: FAMILY MEDICINE

## 2019-01-17 RX ORDER — HYDROCODONE BITARTRATE AND ACETAMINOPHEN 10; 325 MG/1; MG/1
TABLET ORAL
Qty: 120 TAB | Refills: 0 | Status: SHIPPED | OUTPATIENT
Start: 2019-02-22 | End: 2019-01-17 | Stop reason: SDUPTHER

## 2019-01-17 RX ORDER — HYDROCODONE BITARTRATE AND ACETAMINOPHEN 10; 325 MG/1; MG/1
TABLET ORAL
Qty: 120 TAB | Refills: 0 | Status: SHIPPED | OUTPATIENT
Start: 2019-03-22 | End: 2019-04-19 | Stop reason: SDUPTHER

## 2019-01-17 RX ORDER — HYDROCODONE BITARTRATE AND ACETAMINOPHEN 10; 325 MG/1; MG/1
TABLET ORAL
Qty: 120 TAB | Refills: 0 | Status: SHIPPED | OUTPATIENT
Start: 2019-01-25 | End: 2019-01-17 | Stop reason: SDUPTHER

## 2019-01-17 NOTE — PROGRESS NOTES
Pain Management Assessment:    1. Current analgesic regimen:   Norco 10/325 4/day, Dilaudid 4 mg for intermittent breakthrough pain    2. Analgesia:   ? What was your pain level on average during the past week? 7   ? What was your pain level at its worst during the past week? 7   ? What percentage of your pain has been relieved during the past week? 40   ? Is the amount of pain relief you are now obtaining from your current pain                             reliever(s) enough to make a real difference in your life? Yes for IC, not much help with headaches     3. ADL’s - indicate whether patient’s functioning with current pain reliever(s) is better, the same, or worse since the last assessment:   ? Physical functioning: same   ? Family relationships: same   ? Social relationships:same   ? Mood: same   ? Sleep patterns: sl worse   ? Overall functioning: same    4. Adverse events:   Are you experiencing any side effects from current pain relievers? no    5. [facility] 7[facility]12[facility]18    6. [place] Prescription Monitoring Program last reviewed: 1/17/19    7. Opiate agreement signed and scanned: yes    Patient was seen for 25 minutes face to face of which, 15 minutes was spent counseling regarding the above mentioned problems.    Social history-single, not currently working  Current medications-  Prior to Admission medications    Medication Sig Start Date End Date Taking? Authorizing Provider   HYDROcodone/acetaminophen (NORCO)  MG Tab TAKE 1 TABLET ORALLY EVERY 6 HOURS IF NEEDED 3/22/19 4/22/19 Yes Michel Triana M.D.   lidocaine (XYLOCAINE) 5 % Ointment APPLY 2-4 GRAMS TO AFFECTED AREAS IF NEEDED 1/10/19   Michel Triana M.D.   cyclobenzaprine (FLEXERIL) 10 MG Tab Take 1 Tab by mouth 3 times a day as needed. 12/28/18   Michel Triana M.D.   meloxicam (MOBIC) 15 MG tablet Take 1 Tab by mouth every day. 12/28/18   Michel Triana M.D.   HYDROmorphone (DILAUDID) 4 MG Tab Take 1 Tab by mouth  every 6 hours as needed for up to 90 days. 11/13/18 2/11/19  Michel Triana M.D.   triamterene-hctz (MAXZIDE-25/DYAZIDE) 37.5-25 MG Tab TAKE 1 TABLET ORALLY ONCE DAILY 11/8/18   Michel Triana M.D.   promethazine-codeine (PHENERGAN-CODEINE) 6.25-10 MG/5ML Syrup Take 5 mL by mouth 4 times a day as needed for Cough for up to 90 days. 10/25/18 1/23/19  Michel Triana M.D.   betamethasone dipropionate (DIPROLENE) 0.05 % Ointment APPLY THINLY 2 TIMES DAILY IF NEEDED 10/2/18   Michel Triana M.D.   DILTIAZem CD (CARDIZEM CD) 300 MG CAPSULE SR 24 HR TAKE 1 CAPSULE ORALLY ONCE DAILY 8/9/18   Michel Triana M.D.   terazosin (HYTRIN) 5 MG Cap TAKE 1 CAPSULE ORALLY ONCE DAILY 8/9/18   Michel Triana M.D.     Physical exam-General-alert cooperative male in mild distress  Neck-1+-2+ tender bilaterally from C3-C7 more tender at the right lateral base of the left lateral base.  Upper extremities-intact light touch, intact strength  Lower extremities-intact light touch, intact strength  Cardiac-regular rate and rhythm. No heave or thrill. Murmur absent  Psych-normal affect with good eye contact. Normal grooming. Oriented x4.    Assessment 1.  Chronic pain  2.  Chronic neck pain  3.  Interstitial cystitis  4.  Chronic lumbar pain  5.  Norco renewed times 3 months  6.  Anticipate Dilaudid refill request in early February  7.  Patient will retry meloxicam for chronic neck and back pain-watch for night sweats (not sure if this was due to recent trial of Flexeril)

## 2019-01-29 ENCOUNTER — PATIENT MESSAGE (OUTPATIENT)
Dept: MEDICAL GROUP | Facility: MEDICAL CENTER | Age: 64
End: 2019-01-29

## 2019-01-29 DIAGNOSIS — R10.2 PELVIC PAIN: ICD-10-CM

## 2019-01-29 DIAGNOSIS — M54.2 NECK PAIN OF OVER 3 MONTHS DURATION: ICD-10-CM

## 2019-01-29 RX ORDER — TIZANIDINE 4 MG/1
4 TABLET ORAL EVERY 6 HOURS PRN
Qty: 60 TAB | Refills: 3 | Status: SHIPPED | OUTPATIENT
Start: 2019-01-29 | End: 2019-02-04 | Stop reason: SDUPTHER

## 2019-01-29 RX ORDER — HYDROMORPHONE HYDROCHLORIDE 4 MG/1
4 TABLET ORAL EVERY 6 HOURS PRN
Qty: 45 TAB | Refills: 0 | Status: SHIPPED | OUTPATIENT
Start: 2019-01-29 | End: 2019-04-10 | Stop reason: SDUPTHER

## 2019-01-29 NOTE — TELEPHONE ENCOUNTER
From: Allen Mccabe  To: Michel Triana M.D.  Sent: 1/29/2019 2:17 PM PST  Subject: RE: Procedure Question    OK I will give the med a try. As far as the pt, I continue to do the excercises from last session and if you think seeing again I am open to trying.    ----- Message -----  From: Michel Triana M.D.  Sent: 1/29/19, 11:28 AM  To: Allen Mccabe  Subject: RE: Procedure Question    Allen, go ahead and drop the meloxicam due to lack of effect. The options with regard to the neck pain include an alternative, non-Flexeril, muscle relaxant such as tizanidine 4 mg. Could also send you to physical therapy for about 6 visits to see if they can assist with this soreness/pain. Local heat also may be of short-term benefit. Dr. MILAN    ----- Message -----   From: Allen Mccabe   Sent: 1/29/2019 10:21 AM PST   To: Michel Triana M.D.  Subject: Procedure Question    Doc; It has been 11 days since last visit. I have been taking the Meloxicam(1 per day) with some intermittent night sweats at times. However I do not see any improvements in my right side neck tension or headaches. Headaches are low level almost all the time and increasing to intensity of 7.5/10 for periods lasting up to a couple of days.

## 2019-01-29 NOTE — TELEPHONE ENCOUNTER
From: Allen Mccabe  Sent: 1/29/2019 10:12 AM PST  Subject: Medication Renewal Request    Allen Mccabe would like a refill of the following medications:     HYDROmorphone (DILAUDID) 4 MG Tab [Michel Triana M.D.]    Preferred pharmacy: [id number] - [place], [place] - [address]    Comment:

## 2019-02-02 ENCOUNTER — PATIENT MESSAGE (OUTPATIENT)
Dept: MEDICAL GROUP | Facility: MEDICAL CENTER | Age: 64
End: 2019-02-02

## 2019-02-02 DIAGNOSIS — M54.2 NECK PAIN OF OVER 3 MONTHS DURATION: ICD-10-CM

## 2019-02-04 RX ORDER — TIZANIDINE 4 MG/1
4 TABLET ORAL EVERY 6 HOURS PRN
Qty: 60 TAB | Refills: 3 | Status: SHIPPED | OUTPATIENT
Start: 2019-02-04 | End: 2020-09-29 | Stop reason: SDUPTHER

## 2019-02-04 NOTE — TELEPHONE ENCOUNTER
From: Allen Mccabe  To: Michel Triana M.D.  Sent: 2/2/2019 2:30 PM PST  Subject: RE: Procedure Question    Doc;  [facility] does not take my insurance so I called and asked them to cancel my prescription for the tizanidine. They cannot transfer the rx so will you write anew one and [facility]     Thanks......Allen  ----- Message -----  From: Michel Triana M.D.  Sent: 1/29/19, 3:39 PM  To: Allen Mccabe  Subject: RE: Procedure Question    Allen I would suggest doing both since we do not have too many other real alternatives to follow back on. Will send both orders. Dr. MILAN    ----- Message -----   From: Allen Mccabe   Sent: 1/29/2019 2:17 PM PST   To: Michel Triana M.D.  Subject: RE: Procedure Question    OK I will give the med a try. As far as the pt, I continue to do the excercises from last session and if you think seeing again I am open to trying.    ----- Message -----  From: Michel Triana M.D.  Sent: 1/29/19, 11:28 AM  To: Allen Mccabe  Subject: RE: Procedure Question    Allen, go ahead and drop the meloxicam due to lack of effect. The options with regard to the neck pain include an alternative, non-Flexeril, muscle relaxant such as tizanidine 4 mg. Could also send you to physical therapy for about 6 visits to see if they can assist with this soreness/pain. Local heat also may be of short-term benefit. Dr. MILAN    ----- Message -----   From: Allen Mccabe   Sent: 1/29/2019 10:21 AM PST   To: Michel Triana M.D.  Subject: Procedure Question    Doc; It has been 11 days since last visit. I have been taking the Meloxicam(1 per day) with some intermittent night sweats at times. However I do not see any improvements in my right side neck tension or headaches. Headaches are low level almost all the time and increasing to intensity of 7.5/10 for periods lasting up to a couple of days.

## 2019-03-06 ENCOUNTER — PHYSICAL THERAPY (OUTPATIENT)
Dept: PHYSICAL THERAPY | Facility: REHABILITATION | Age: 64
End: 2019-03-06
Attending: FAMILY MEDICINE
Payer: MEDICAID

## 2019-03-06 DIAGNOSIS — M54.2 NECK PAIN OF OVER 3 MONTHS DURATION: ICD-10-CM

## 2019-03-06 PROCEDURE — 97162 PT EVAL MOD COMPLEX 30 MIN: CPT

## 2019-03-06 ASSESSMENT — ENCOUNTER SYMPTOMS
PAIN SCALE AT HIGHEST: 8
PAIN SCALE AT LOWEST: 5
QUALITY: TIGHTNESS
PAIN TIMING: ALL DAY
PAIN SCALE: 7
QUALITY: TIGHT

## 2019-03-06 NOTE — OP THERAPY EVALUATION
"  Outpatient Physical Therapy  INITIAL EVALUATION    [facility] Physical Therapy [facility]  [address]  [address]  [place] [address]  Phone:  [phone]  Fax:  [phone]    Date of Evaluation: 03/06/2019    Patient: Allen Mccabe  YOB: 1955  MRN: [id number]     Referring Provider: Michel Triana M.D.  [address]  [address]  [place] [address]   Referring Diagnosis Neck pain of over 3 months duration [M54.2, G89.29]     Time Calculation  Start time: 1100  Stop time: 1150 Time Calculation (min): 50 minutes     Physical Therapy Occurrence Codes    Date of onset of impairment:  1/29/19   Date physical therapy care plan established or reviewed:  3/6/19   Date physical therapy treatment started:  3/6/19          Chief Complaint: Neck Pain    Visit Diagnoses     ICD-10-CM   1. Neck pain of over 3 months duration M54.2    G89.29         Subjective:   History of Present Illness:     Mechanism of injury:  Pt \"Allne\" states his neck pain is on the right side, the muscles are extremely tense and tight. The pain and HA are pretty constant, they range in intensity, but always there. At times it is painful to the touch. He is having headaches, pretty much the entire head, not in one area or one side. He has difficulty pinpointing what aggravates it, other than moving too fast will aggravate his HA. 15 years ago he had 2 vertebrae fused and has severely limited ROM rotation to the L, which affects him looking in his blindspot. He has a sock filled with rice and heats it up and lays it over his eyes. It feels good for about 1/2 after, but does not last. Pt is retired, does a lot of reading and walking. The pain does not impede what he is doing, but does decrease his quality of life. He was taking opioids and valium, and his doctor had him pick one so stopped valium. He does a lot of stretching and ROM exercises, self massages. Pt denies n/t/weakness into his BUE. Pt denies n/v/weakness/dizziness, vision " "changes. Pt denies cardiac issues, history of cancer, nothing else to add. No allergies to note.  Headache comments: Constant HA  Sleep disturbance:  Not disrupted  Pain:     Current pain ratin    At best pain ratin    At worst pain ratin (8.5)    Quality:  Tightness and tight (\"pushing in on a muscle bruise\")    Pain timing:  All day  Patient Goals:     Patient goals for therapy:  Decreased pain, increased strength and increased motion    Other patient goals:  Decreased HAs, decreased pain      Past Medical History:   Diagnosis Date   • Dyshidrotic eczema    • Headache(784.0)    • Hypertension    • Interstitial cystitis    • Obesity    • Pain     throat   • Sprain of neck    • Urinary bladder disorder      Past Surgical History:   Procedure Laterality Date   • BASAL CELL EXCISION Left 2016    Procedure: SQUAMOUS CELL EXCISION EAR AND EXCISION LESION EAR;  Surgeon: Austen Epps M.D.;  Location: SURGERY SURGICAL [facility] ORS;  Service:    • LARYNGOSCOPY  2015    Performed by Michele Maynard M.D. at SURGERY SAME DAY [facility] ORS   • ESOPHAGOSCOPY  2015    Performed by Michele Maynard M.D. at SURGERY SAME DAY [facility] ORS   • CERVICAL FUSION POSTERIOR     • APPENDECTOMY     • CARPAL TUNNEL RELEASE      R   • CYSTOSCOPY     • HERNIA REPAIR      bilat   • OTHER      bladder surgery   • TONSILLECTOMY     • TURP-VAPOR       Social History   Substance Use Topics   • Smoking status: Never Smoker   • Smokeless tobacco: Never Used   • Alcohol use 0.0 oz/week      Comment: rare     Family and Occupational History     Social History   • Marital status: Single     Spouse name: N/A   • Number of children: N/A   • Years of education: N/A       Objective     Postural Observations  Seated posture: fair  Standing posture: fair  Correction of posture: has no consistent effect        Shoulder Screen    Shoulder active range of motion within functional limits.  Shoulder strength within functional " limits.    Palpation   Left   Tenderness of the cervical paraspinals, levator scapulae, scalenes, sternocleidomastoid, supraspinatus, thoracic paraspinals and upper trapezius.     Right   Tenderness of the cervical paraspinals, levator scapulae, scalenes, sternocleidomastoid, supraspinatus, thoracic paraspinals and upper trapezius.     Active Range of Motion     Cervical Spine   Flexion: within functional limits  Extension: within functional limits  Left lateral flexion: decreased  Right lateral flexion: decreased  Left rotation: decreased (45 deg)  Right rotation: decreased (61 deg)    Additional Active Range of Motion Details  More pain R>L with SB and rotation    Joint Play   Spine     Central PA [place]        C0-1: hypomobile       C2: hypomobile       C3: hypomobile and painful       C4: hypomobile and painful       C5: hypomobile and painful       C6: WFL       C7: WFL       T1: hypermobile and painful       T2: hypomobile and painful       T3: hypomobile and painful    Unilateral PA Glide (left)        C0-1: painful and hypomobile       C2: hypomobile and painful       C3: hypomobile and painful       C4: hypomobile and painful       C5: hypomobile and painful       C6: WFL       C7: WFL       T1: hypomobile and painful       T2: painful and hypomobile    Unilateral PA Glide (right)        C0-1: hypomobile and painful       C2: painful and hypomobile       C3: painful and hypomobile       C4: hypomobile and painful       C5: hypomobile and painful       C6: WFL       C7: WFL       T1: hypomobile and painful       T2: painful and hypomobile    Additional joint play details:   Pain with R side glide of C3-4. Increased HA with rotational ROM and pressure to the L of C3-4        Strength:      Additional Strength Details  MMT: 5/5 for B shoulder abduction/IR/ER, elbow flexion/extension, wrist flexion/extension,  squeeze, thumb abduction, finger adduction    Tests   Cervical spine     Left Spine   Negative  "Sharp-Karuna test.     Right spine   Negative Sharp-Karuna test.         Therapeutic Exercises (CPT 35185):     1. Chin tuck, 5\" x 10 x 1    2. Scap retract, 5\" x 10 x 1    3. Scap + ER, pink, 10 x 1    4. Cx rotation ROM with towel, 10 x 1      Time-based treatments/modalities:  Therapeutic exercise minutes (CPT 08552): 5 minutes       Assessment, Response and Plan:   Impairments: abnormal muscle firing, abnormal or restricted ROM, impaired physical strength and pain with function    Assessment details:  Pt is a pleasant, cooperative 64 yo male who presents with R neck pain and Has. Pt's headaches appear to be a combination of cervicogenic and tension HA. Pt has decreased cx ROM and SB with more pain on the R side of his neck. Pt's HA are constant and increase with L cx ROM rotation with pressure to C3-4 indicating cervicogenic origin, but are also more broad without a clear indication of which movements relieve his pain, indicating tension origin. Pt will benefit from skilled physical therapy in order to strengthen his scap and DNF musculature, improve his painfree cx ROM, and decrease his overall pain and HA in order to return to ADLs and recreational activities with less pain and restriction.  Other barriers to therapy:  Chronicity of pain, no clear indication of what aggravates/helps  Prognosis: fair    Goals:   Short Term Goals:   1. Pt is to have 60 deg bilateral cx rotation ROM  2. Pt is to have a HA <5 days/week  3. Pt to perform HEP without cueing demonstrating compliance  Short term goal time span:  2-4 weeks      Long Term Goals:    1. Pt is to have cx SB without increased in baseline pain  2. Pt is to have 3/10 VAS pain at rest in neck and HA  3. Pt is to have HA <3 days/week  Long term goal time span:  6-8 weeks    Plan:   Therapy options:  Physical therapy treatment to continue  Planned therapy interventions:  Mechanical Traction (CPT 61411), Neuromuscular Re-education (CPT 93419), Therapeutic " Exercise (CPT 51824) and E Stim Unattended (CPT 59327)  Frequency:  2x week  Duration in weeks:  8  Discussed with:  Patient  Plan details:  Pt to start with PT for 2x/week and will then decrease to 1x/week once HEP is established and pt is making improvements between visits.    1 estim (72119), 1 mech traction (88899), 1 neuro re ed (96069), 1 therapeutic exercise (15031) per session      Functional Limitations and Severity Modifiers  Neck Disability Total: 15   Current:  na   Goal:  na     Referring provider co-signature:  I have reviewed this plan of care and my co-signature certifies the need for services.  Certification Dates:   From 03/06/19     To 05/01/19    Physician Signature: ________________________________ Date: ______________

## 2019-03-08 ENCOUNTER — APPOINTMENT (OUTPATIENT)
Dept: PHYSICAL THERAPY | Facility: REHABILITATION | Age: 64
End: 2019-03-08
Attending: FAMILY MEDICINE
Payer: MEDICAID

## 2019-03-13 ENCOUNTER — APPOINTMENT (OUTPATIENT)
Dept: PHYSICAL THERAPY | Facility: REHABILITATION | Age: 64
End: 2019-03-13
Attending: FAMILY MEDICINE
Payer: MEDICAID

## 2019-03-15 ENCOUNTER — APPOINTMENT (OUTPATIENT)
Dept: PHYSICAL THERAPY | Facility: REHABILITATION | Age: 64
End: 2019-03-15
Attending: FAMILY MEDICINE
Payer: MEDICAID

## 2019-03-19 DIAGNOSIS — R05.9 COUGH: ICD-10-CM

## 2019-03-19 RX ORDER — PROMETHAZINE HYDROCHLORIDE AND CODEINE PHOSPHATE 6.25; 1 MG/5ML; MG/5ML
5 SYRUP ORAL 4 TIMES DAILY PRN
Qty: 480 ML | Refills: 0 | Status: SHIPPED | OUTPATIENT
Start: 2019-03-19 | End: 2019-06-17

## 2019-03-27 ENCOUNTER — APPOINTMENT (OUTPATIENT)
Dept: PHYSICAL THERAPY | Facility: REHABILITATION | Age: 64
End: 2019-03-27
Attending: FAMILY MEDICINE
Payer: MEDICAID

## 2019-03-29 ENCOUNTER — APPOINTMENT (OUTPATIENT)
Dept: PHYSICAL THERAPY | Facility: REHABILITATION | Age: 64
End: 2019-03-29
Attending: FAMILY MEDICINE
Payer: MEDICAID

## 2019-04-03 ENCOUNTER — PHYSICAL THERAPY (OUTPATIENT)
Dept: PHYSICAL THERAPY | Facility: REHABILITATION | Age: 64
End: 2019-04-03
Attending: FAMILY MEDICINE
Payer: MEDICAID

## 2019-04-03 DIAGNOSIS — G89.29 CHRONIC LOW BACK PAIN WITHOUT SCIATICA, UNSPECIFIED BACK PAIN LATERALITY: ICD-10-CM

## 2019-04-03 DIAGNOSIS — M54.50 CHRONIC LOW BACK PAIN WITHOUT SCIATICA, UNSPECIFIED BACK PAIN LATERALITY: ICD-10-CM

## 2019-04-03 DIAGNOSIS — M54.2 NECK PAIN OF OVER 3 MONTHS DURATION: ICD-10-CM

## 2019-04-03 PROCEDURE — 97012 MECHANICAL TRACTION THERAPY: CPT

## 2019-04-03 PROCEDURE — 97112 NEUROMUSCULAR REEDUCATION: CPT

## 2019-04-03 PROCEDURE — 97110 THERAPEUTIC EXERCISES: CPT

## 2019-04-03 NOTE — OP THERAPY DAILY TREATMENT
"  Outpatient Physical Therapy  DAILY TREATMENT     [facility] Physical [address]  [address]  [place] [address]  Phone:  [phone]  Fax:  [phone]    Date: 04/03/2019    Patient: Allen Mccabe  YOB: 1955  MRN: [id number]     Time Calculation  Start time: 1030  Stop time: 1120 Time Calculation (min): 50 minutes     Chief Complaint: Neck Problem    Visit #: 2    SUBJECTIVE:  Pt states things are going. The neck pain and Has are always there, but intensity changes. The last 3 weeks he has been really sick so has not really been focused on the exercises or the neck.     OBJECTIVE:  Current objective measures: decreased L rotation, increased L C3-5 rotational stiffness           Therapeutic Exercises (CPT 33270):     1. Chin tuck, 5\" x 10 x 1    2. Scap retract, 5\" x 10 x 1    3. Scap + ER pink, , 10 x 1    4. Cx rotation ROM with towel, 10 x 1      Therapeutic Exercise Summary: Reviewed HEP above.     Therapeutic Treatments and Modalities:     1. Neuromuscular Re-education (CPT 08930), STM to B cervical paraspinals, UT, 15 min. C3-6 rotational mobs, grade 3, 20\" x 3 each. cx distraction, grade 3, 6\" x 5.     2. Mechanical Traction (CPT 81988), cx traction, 15/8lbs, 60/20\" x 15 min with P    Time-based treatments/modalities:  Therapeutic exercise minutes (CPT 27083): 10 minutes  Neuromusc re-ed, balance, coor, post minutes (CPT 34478): 20 minutes       ASSESSMENT:   Response to treatment: Pt presents with neck pain and Has. Pt has not had much change or really been able to focus on his neck due to being sick for three weeks. HEP was reviewed and pt encouraged to continue. Pt had restriction in his L rotation, especially at C3-5, which improved following manual. Pt felt traction pull could be stronger.     PLAN/RECOMMENDATIONS:   Plan for treatment: therapy treatment to continue next visit.  Planned interventions for next visit: continue with current treatment. Repeat " manual (L C3-5) rotation. Repeat traction, increase to 18lbs

## 2019-04-05 ENCOUNTER — PHYSICAL THERAPY (OUTPATIENT)
Dept: PHYSICAL THERAPY | Facility: REHABILITATION | Age: 64
End: 2019-04-05
Attending: FAMILY MEDICINE
Payer: MEDICAID

## 2019-04-05 DIAGNOSIS — M54.2 NECK PAIN OF OVER 3 MONTHS DURATION: ICD-10-CM

## 2019-04-05 PROCEDURE — 97110 THERAPEUTIC EXERCISES: CPT

## 2019-04-05 PROCEDURE — 97012 MECHANICAL TRACTION THERAPY: CPT

## 2019-04-05 PROCEDURE — 97112 NEUROMUSCULAR REEDUCATION: CPT

## 2019-04-05 NOTE — OP THERAPY DAILY TREATMENT
"  Outpatient Physical Therapy  DAILY TREATMENT     [facility] Physical [address]  [address]  [place] [address]  Phone:  [phone]  Fax:  [phone]    Date: 04/05/2019    Patient: Allen Mccabe  YOB: 1955  MRN: [id number]     Time Calculation  Start time: 0930  Stop time: 1020 Time Calculation (min): 50 minutes     Chief Complaint: Neck Problem    Visit #: 3    SUBJECTIVE:  Pt states he is doing okay. Neck has actually been feeling okay, but did wake up with a HA this morning.     OBJECTIVE:  Current objective measures: decreased L rotation, increased L C3-5 rotational stiffness           Therapeutic Exercises (CPT 76493):     1. Chin tuck, 5\" x 10 x 1, reviewed    2. Scap retract, 5\" x 10 x 1, reviewed    3. Scap + ER pink, , 10 x 1, reviewed    4. Cx rotation ROM with towel, 10 x 1, reviewed    5. Rows, 5\" x 20 x 1, pink    6. Shoulder ext, 5\" x 10 x 1, pink    7. Box exer, 10 x 1      Therapeutic Exercise Summary: Reviewed HEP above.     Therapeutic Treatments and Modalities:     1. Neuromuscular Re-education (CPT 81784), STM to B UT/cx parasipnals, 5 min. seated distraction with L rotation, 10 x 2.  seated MWM with distraction and blocking at C5-6 with L rotation, 10 x 1, improved L rotation following MWM    2. Mechanical Traction (CPT 75134), cx traction, 18/10lbs, 60/20\" x 15 min with MHP    Time-based treatments/modalities:  Therapeutic exercise minutes (CPT 51405): 10 minutes  Neuromusc re-ed, balance, coor, post minutes (CPT 54403): 15 minutes       ASSESSMENT:   Response to treatment: Pt presents with neck pain and Has. Pt had increased ROM in L cx rotation following MWM technique. Pt had increased UT activation with rows/shoulder ext, can correct with cueing.     PLAN/RECOMMENDATIONS:   Plan for treatment: therapy treatment to continue next visit.  Planned interventions for next visit: continue with current treatment. Repeat manual (L C3-5) rotation. " Repeat traction, 18lbs

## 2019-04-10 ENCOUNTER — PHYSICAL THERAPY (OUTPATIENT)
Dept: PHYSICAL THERAPY | Facility: REHABILITATION | Age: 64
End: 2019-04-10
Attending: FAMILY MEDICINE
Payer: MEDICAID

## 2019-04-10 DIAGNOSIS — M54.2 NECK PAIN OF OVER 3 MONTHS DURATION: ICD-10-CM

## 2019-04-10 DIAGNOSIS — M54.50 CHRONIC LOW BACK PAIN WITHOUT SCIATICA, UNSPECIFIED BACK PAIN LATERALITY: ICD-10-CM

## 2019-04-10 DIAGNOSIS — G89.29 CHRONIC LOW BACK PAIN WITHOUT SCIATICA, UNSPECIFIED BACK PAIN LATERALITY: ICD-10-CM

## 2019-04-10 PROCEDURE — 97012 MECHANICAL TRACTION THERAPY: CPT

## 2019-04-10 PROCEDURE — 97110 THERAPEUTIC EXERCISES: CPT

## 2019-04-10 PROCEDURE — 97112 NEUROMUSCULAR REEDUCATION: CPT

## 2019-04-10 NOTE — OP THERAPY DAILY TREATMENT
"  Outpatient Physical Therapy  DAILY TREATMENT     [facility] Physical [address]  [address]  [place] [address]  Phone:  [phone]  Fax:  [phone]    Date: 04/10/2019    Patient: Allen Mccabe  YOB: 1955  MRN: [id number]     Time Calculation  Start time: 1030  Stop time: 1120 Time Calculation (min): 50 minutes     Chief Complaint: Neck Problem    Visit #: 4    SUBJECTIVE:  Pt states he has had a HA all morning.    OBJECTIVE:  Current objective measures: decreased L rotation, increased L C3-5 rotational stiffness           Therapeutic Exercises (CPT 42274):     1. Chin tuck, 5\" x 10 x 1, reviewed    2. Scap retract, 5\" x 10 x 1, reviewed    3. Scap + ER pink, , 10 x 1, reviewed    4. Cx rotation ROM with towel, 10 x 1, reviewed    5. Rows, 5\" x 20 x 1, pink, reviewed    6. Shoulder ext, 5\" x 10 x 1, pink, reviewed    7. Box exer, 10 x 1, reviewed      Therapeutic Exercise Summary: Reviewed HEP above.     Therapeutic Treatments and Modalities:     1. Neuromuscular Re-education (CPT 22341), STM to B UT/cx parasipnals, 15 min. seated distraction with L rotation, 10 x 2.  seated MWM with distraction and blocking at C5-6 with L rotation, 10 x 1, decraesed HA following manual.    2. Mechanical Traction (CPT 76803), cx traction, 18/10lbs, 60/20\" x 15 min with [facility]    Time-based treatments/modalities:  Therapeutic exercise minutes (CPT 87885): 8 minutes  Neuromusc re-ed, balance, coor, post minutes (CPT 83817): 20 minutes       ASSESSMENT:   Response to treatment: Pt presents with neck pain and Has. Pt had decreased HA intensity following manual and improved L cx rotation ROM.     PLAN/RECOMMENDATIONS:   Plan for treatment: therapy treatment to continue next visit.  Planned interventions for next visit: continue with current treatment. Repeat manual (L C3-5) rotation. Focus on upper thoracic.  Repeat traction, 18lbs. PROGRESS NOTE so pt can schedule into May.      "

## 2019-04-12 ENCOUNTER — PHYSICAL THERAPY (OUTPATIENT)
Dept: PHYSICAL THERAPY | Facility: REHABILITATION | Age: 64
End: 2019-04-12
Attending: FAMILY MEDICINE
Payer: MEDICAID

## 2019-04-12 DIAGNOSIS — M54.2 NECK PAIN OF OVER 3 MONTHS DURATION: ICD-10-CM

## 2019-04-12 PROCEDURE — 97112 NEUROMUSCULAR REEDUCATION: CPT

## 2019-04-12 PROCEDURE — 97110 THERAPEUTIC EXERCISES: CPT

## 2019-04-12 PROCEDURE — 97012 MECHANICAL TRACTION THERAPY: CPT

## 2019-04-12 NOTE — OP THERAPY PROGRESS SUMMARY
Outpatient Physical Therapy  PROGRESS SUMMARY NOTE      [facility] Physical Therapy [facility]  [address]  [address]  [place] [address]  Phone:  [phone]  Fax:  [phone]    Date of Visit: 04/12/2019    Patient: Allen Mccabe  YOB: 1955  MRN: [id number]     Referring Provider: Michel Triana M.D.  [address]  [address]  [place] [address]   Referring Diagnosis Cervicalgia [M54.2];Other chronic pain [G89.29]     Visit Diagnoses     ICD-10-CM   1. Neck pain of over 3 months duration M54.2    G89.29       Rehab Potential: good    Physical Therapy Occurrence Codes    Date of onset of impairment:  1/29/19   Date physical therapy care plan established or reviewed:  3/6/19   Date physical therapy treatment started:  3/6/19          Cert Period: 03/06/19 to 05/01/19  Progress Report Period: 04/12/19 to 06/07/19    Functional Limitations and Severity Modifiers      Current status:     Goal status:           Objective Findings and Assessment:   Patient progression towards goals: Pt has improved cx rotation ROM and decreased intensity of HA and neck pain overall, but continues to have consistent HA throughout the day.    Objective findings and assessment details: Cx ROM: rotation 55 deg to the L, 56 deg to the R. SB creates HA.   VAS: 6/10 worst, 2/10 current    Goals:   Short Term Goals:   1. Pt is to have 60 deg bilateral cx rotation ROM   2. Pt is to have a HA <5 days/week.   3. Pt to perform HEP without cueing demonstrating compliance.     Short term goal time span:  2-4 weeks      Long Term Goals:    1. Pt is to have cx SB without increased in baseline pain.  2. Pt is to have 3/10 VAS pain at rest in neck and HA.   3. Pt is to have HA <3 days/week.   Long term goal time span:  6-8 weeks    Plan:   Planned therapy interventions:  Neuromuscular Re-education (CPT 18775), Therapeutic Exercise (CPT 95241) and Mechanical Traction (CPT 18701)  Frequency:  2x week  Duration in weeks:  8  Plan  details:  Pt will benefit from continued PT for another 2x/week for 8 weeks to continue to work on improved cx ROM and decrease in neck pain and HA.    1 TE (65850), 2 Neuro re-ed (47616), 1 [facility] traction (28811) per visit        Referring provider co-signature:  I have reviewed this plan of care and my co-signature certifies the need for services.    Physician Signature: ________________________________ Date: ______________

## 2019-04-12 NOTE — OP THERAPY DAILY TREATMENT
"  Outpatient Physical Therapy  DAILY TREATMENT     [facility] Physical [address]  [address]  [place] [address]  Phone:  [phone]  Fax:  [phone]    Date: 04/12/2019    Patient: Allen Mccabe  YOB: 1955  MRN: [id number]     Time Calculation  Start time: 1000  Stop time: 1050 Time Calculation (min): 50 minutes     Chief Complaint: Neck Problem    Visit #: 5    SUBJECTIVE:  Pt states he still has a HA, but it is less intense and it feels better. Been better since Wednesday.     OBJECTIVE:  Current objective measures: decreased L rotation, increased L C3-5 rotational stiffness  Cx ROM: rotation 55 deg to the L, 56 deg to the R. SB creates HA.   VAS: 6/10 worst, 2/10 current       Short Term Goals:   1. Pt is to have 60 deg bilateral cx rotation ROM PROGRESSING  2. Pt is to have a HA <5 days/week. PROGRESSING  3. Pt to perform HEP without cueing demonstrating compliance. MET  Short term goal time span:  2-4 weeks      Long Term Goals:    1. Pt is to have cx SB without increased in baseline pain. NOT MET  2. Pt is to have 3/10 VAS pain at rest in neck and HA. NOT MET  3. Pt is to have HA <3 days/week. NOT MET  Long term goal time span:  6-8 weeks    Therapeutic Exercises (CPT 75646):     1. Chin tuck, 5\" x 10 x 1, reviewed    2. Scap retract, 5\" x 10 x 1, reviewed    3. Scap + ER pink, , 10 x 1, reviewed    4. Cx rotation ROM with towel, 10 x 1, reviewed    5. Rows, 5\" x 20 x 1, pink, reviewed    6. Shoulder ext, 5\" x 10 x 1, pink, reviewed    7. Box exer, 10 x 1, reviewed      Therapeutic Exercise Summary: Reviewed HEP above.     Therapeutic Treatments and Modalities:     1. Neuromuscular Re-education (CPT 18529), STM to B UT/cx parasipnals, 15 min. seated distraction with L rotation, 10 x 2.  , decraesed HA following manual.    2. Mechanical Traction (CPT 16930), cx traction, 18/10lbs, 60/20\" x 15 min with MHP    Time-based treatments/modalities:  Therapeutic exercise " minutes (CPT 16624): 8 minutes  Neuromusc re-ed, balance, coor, post minutes (CPT 74536): 20 minutes       ASSESSMENT:   Response to treatment: Pt presents with neck pain and Has. Pt has improved cx rotation ROM and decreased intensity of HA and neck pain overall, but continues to have consistent HA throughout the day. Pt had decreased HA intensity following manual and improved L cx rotation ROM.     PLAN/RECOMMENDATIONS:   Plan for treatment: therapy treatment to continue next visit.  Planned interventions for next visit: continue with current treatment. Repeat manual (L C3-5) rotation. Focus on upper thoracic.  Repeat traction, 18lbs.

## 2019-04-15 ENCOUNTER — PHYSICAL THERAPY (OUTPATIENT)
Dept: PHYSICAL THERAPY | Facility: REHABILITATION | Age: 64
End: 2019-04-15
Attending: FAMILY MEDICINE
Payer: MEDICAID

## 2019-04-15 DIAGNOSIS — M54.2 NECK PAIN OF OVER 3 MONTHS DURATION: ICD-10-CM

## 2019-04-15 PROCEDURE — 97012 MECHANICAL TRACTION THERAPY: CPT

## 2019-04-15 PROCEDURE — 97110 THERAPEUTIC EXERCISES: CPT

## 2019-04-15 PROCEDURE — 97112 NEUROMUSCULAR REEDUCATION: CPT

## 2019-04-15 NOTE — OP THERAPY DAILY TREATMENT
"  Outpatient Physical Therapy  DAILY TREATMENT     [facility] Physical [address]  [address]  [place] [address]  Phone:  [phone]  Fax:  [phone]    Date: 04/15/2019    Patient: Allen Mccabe  YOB: 1955  MRN: [id number]     Time Calculation  Start time: 1030  Stop time: 1120 Time Calculation (min): 50 minutes     Chief Complaint: Neck Problem    Visit #: 6    SUBJECTIVE:  Pt states he had a good weekend. Neck pain and HA have been pretty good.     OBJECTIVE:  Current objective measures: decreased L rotation, increased L C3-5 rotational stiffness  Cx ROM: rotation 55 deg to the L, 56 deg to the R. SB creates HA.   VAS: 6/10 worst, 2/10 current          Therapeutic Exercises (CPT 04627):     1. Chin tuck, 5\" x 10 x 1, reviewed    2. Scap retract, 5\" x 10 x 1, reviewed    3. Scap + ER pink, , 10 x 1, reviewed    4. Cx rotation ROM with towel, 10 x 1, reviewed    5. Rows, 5\" x 20 x 1, pink, reviewed    6. Shoulder ext, 5\" x 10 x 1, pink, reviewed    7. Box exer, 10 x 1, reviewed      Therapeutic Exercise Summary: Reviewed HEP above.     Therapeutic Treatments and Modalities:     1. Neuromuscular Re-education (CPT 97390), STM to B UT/cx parasipnals, 15 min. seated distraction with R rotation, 10 x 1.  to T1-5, rotational mobs T1-5, grade 3, 20\" x 3 each.  , decraesed HA following manual.    2. Mechanical Traction (CPT 51007), cx traction, 20/12lbs, 60/20\" x 15 min with P    Time-based treatments/modalities:  Therapeutic exercise minutes (CPT 65764): 8 minutes  Neuromusc re-ed, balance, coor, post minutes (CPT 06616): 20 minutes       ASSESSMENT:   Response to treatment: Pt presents with neck pain and Has. Pt had improved L cx rotation with no pain after neuro re-ed today, especially with addition of CPAsl to thoracic spine. Pt had slight improvement in R cx rotation, but discomfort at end range.     PLAN/RECOMMENDATIONS:   Plan for treatment: therapy treatment to " continue next visit.  Planned interventions for next visit: continue with current treatment. Repeat neuro (L C3-5) rotation. Focus on upper thoracic.  Repeat traction, 20lbs.

## 2019-04-17 ENCOUNTER — PHYSICAL THERAPY (OUTPATIENT)
Dept: PHYSICAL THERAPY | Facility: REHABILITATION | Age: 64
End: 2019-04-17
Attending: FAMILY MEDICINE
Payer: MEDICAID

## 2019-04-17 DIAGNOSIS — G89.29 CHRONIC LOW BACK PAIN WITHOUT SCIATICA, UNSPECIFIED BACK PAIN LATERALITY: ICD-10-CM

## 2019-04-17 DIAGNOSIS — M54.50 CHRONIC LOW BACK PAIN WITHOUT SCIATICA, UNSPECIFIED BACK PAIN LATERALITY: ICD-10-CM

## 2019-04-17 DIAGNOSIS — M54.2 NECK PAIN OF OVER 3 MONTHS DURATION: ICD-10-CM

## 2019-04-17 PROCEDURE — 97110 THERAPEUTIC EXERCISES: CPT

## 2019-04-17 PROCEDURE — 97112 NEUROMUSCULAR REEDUCATION: CPT

## 2019-04-17 NOTE — OP THERAPY DAILY TREATMENT
"  Outpatient Physical Therapy  DAILY TREATMENT     [facility] Physical [address].  [address]  [place] [address]  Phone:  [phone]  Fax:  [phone]    Date: 04/17/2019    Patient: Allen Mccabe  YOB: 1955  MRN: [id number]     Time Calculation  Start time: 1035  Stop time: 1100 Time Calculation (min): 25 minutes     Chief Complaint: Neck Problem    Visit #: 7    SUBJECTIVE:  Pt states he is a little nauseaous today. Low back is letting him know it is there. Neck seems to be okay.     OBJECTIVE:  Current objective measures: decreased L rotation, increased L C3-5 rotational stiffness  Cx ROM: rotation 55 deg to the L, 56 deg to the R. SB creates HA.   VAS: 6/10 worst, 2/10 current          Therapeutic Exercises (CPT 51471):     1. Chin tuck, 5\" x 10 x 1, reviewed    2. Scap retract, 5\" x 10 x 1, reviewed    3. Scap + ER pink, , 10 x 1, reviewed    4. Cx rotation ROM with towel, 10 x 1, reviewed    5. Rows, 5\" x 20 x 1, pink, reviewed    6. Shoulder ext, 5\" x 10 x 1, pink, reviewed    7. Box exer, 10 x 1, reviewed      Therapeutic Exercise Summary: Reviewed HEP above.     Therapeutic Treatments and Modalities:     1. Neuromuscular Re-education (CPT 70538), STM to B UT/cx parasipnals, 15 min. seated distraction with R rotation, 10 x 1.  to T1-5, rotational mobs T1-5, grade 3, 20\" x 3 each.      Time-based treatments/modalities:  Therapeutic exercise minutes (CPT 51709): 8 minutes  Neuromusc re-ed, balance, coor, post minutes (CPT 64391): 17 minutes       ASSESSMENT:   Response to treatment: Pt presents with neck pain and Has. Pt had improved B rotation and release of trigger points after today's session. Pt has overall reduction in neck pain and Has. Skipped traction today on account of nausea per pt request.     PLAN/RECOMMENDATIONS:   Plan for treatment: therapy treatment to continue next visit.  Planned interventions for next visit: continue with current treatment. " Repeat neuro (L C3-5) rotation. Focus on upper thoracic.  Repeat traction, 20lbs.

## 2019-04-19 ENCOUNTER — OFFICE VISIT (OUTPATIENT)
Dept: MEDICAL GROUP | Facility: MEDICAL CENTER | Age: 64
End: 2019-04-19
Attending: FAMILY MEDICINE
Payer: MEDICAID

## 2019-04-19 VITALS
SYSTOLIC BLOOD PRESSURE: 122 MMHG | TEMPERATURE: 97 F | HEIGHT: 66 IN | DIASTOLIC BLOOD PRESSURE: 70 MMHG | WEIGHT: 225 LBS | RESPIRATION RATE: 20 BRPM | HEART RATE: 72 BPM | OXYGEN SATURATION: 94 % | BODY MASS INDEX: 36.16 KG/M2

## 2019-04-19 DIAGNOSIS — N30.10 INTERSTITIAL CYSTITIS: ICD-10-CM

## 2019-04-19 DIAGNOSIS — G89.4 CHRONIC PAIN SYNDROME: ICD-10-CM

## 2019-04-19 DIAGNOSIS — R10.2 PELVIC PAIN: ICD-10-CM

## 2019-04-19 PROCEDURE — 99213 OFFICE O/P EST LOW 20 MIN: CPT | Performed by: FAMILY MEDICINE

## 2019-04-19 RX ORDER — HYDROCODONE BITARTRATE AND ACETAMINOPHEN 10; 325 MG/1; MG/1
TABLET ORAL
Qty: 112 TAB | Refills: 0 | Status: SHIPPED | OUTPATIENT
Start: 2019-06-14 | End: 2019-07-02 | Stop reason: SDUPTHER

## 2019-04-19 RX ORDER — HYDROCODONE BITARTRATE AND ACETAMINOPHEN 10; 325 MG/1; MG/1
TABLET ORAL
Qty: 112 TAB | Refills: 0 | Status: SHIPPED | OUTPATIENT
Start: 2019-05-17 | End: 2019-04-19 | Stop reason: SDUPTHER

## 2019-04-19 RX ORDER — HYDROCODONE BITARTRATE AND ACETAMINOPHEN 10; 325 MG/1; MG/1
TABLET ORAL
Qty: 112 TAB | Refills: 0 | Status: SHIPPED | OUTPATIENT
Start: 2019-04-19 | End: 2019-04-19 | Stop reason: SDUPTHER

## 2019-04-19 NOTE — PROGRESS NOTES
"Subjective:      Allen Mccabe is a 64 y.o. male who presents with Follow-Up            HPI 1.Chronic pain- ongoing pelvic pain Associated with his interstitial cystitis.  Patient has been stable and compliant taking typically for Norco 10 mg tablets per day.  Rarely uses hydromorphone 4 mg 4 breakthrough pain.  Patient had difficulty with apparent very weak strength rather ineffective generic versions of Norco.  Reports that his pharmacy just returned back to the quality test brand which has been much more consistently effective in regards to potency.  Notes some intermittent transient nausea but no actual emesis.  No particular constipation.  Patient also adds that he is doing physical therapy for assistance with his headaches.  He is getting significantly more relaxed and looser in the muscles of his neck and shoulder areas.    ROS negative for diarrhea, chest pain, palpitations       Objective:     /70 (BP Location: Left arm, Patient Position: Sitting)   Pulse 72   Temp 36.1 °C (97 °F) (Temporal)   Resp 20   Ht 1.676 m (5' 5.98\")   Wt 102.1 kg (225 lb)   SpO2 94%   BMI 36.34 kg/m²       Physical Exam  Gen.- alert, cooperative, in no acute distress  Neck- midline trachea, thyroid not enlarged or tender,supple, no cervical adenopathy  Chest-clear to auscultation and percussion with normal breath sounds. No retractions. Chest wall nontender  Cardiac- regular rhythm and rate. No murmur, thrill, or heave  Abdomen-Abdomen is soft, nontender, normal bowel sounds, no masses, guarding, or organomegaly.            Assessment/Plan:     1. Pelvic pain      2. Interstitial cystitis    - HYDROcodone/acetaminophen (NORCO)  MG Tab; TAKE 1 TABLET ORALLY EVERY 6 HOURS IF NEEDED  Dispense: 112 Tab; Refill: 0    3. Chronic pain syndrome    - HYDROcodone/acetaminophen (NORCO)  MG Tab; TAKE 1 TABLET ORALLY EVERY 6 HOURS IF NEEDED  Dispense: 112 Tab; Refill: 0    Plan: 1. Continue PT   2.  Renew Norco " 10/325 4 times daily for 3 months  3.  Follow-up in 3 months

## 2019-04-24 ENCOUNTER — APPOINTMENT (OUTPATIENT)
Dept: PHYSICAL THERAPY | Facility: REHABILITATION | Age: 64
End: 2019-04-24
Attending: FAMILY MEDICINE
Payer: MEDICAID

## 2019-04-24 NOTE — OP THERAPY DAILY TREATMENT
"  Outpatient Physical Therapy  DAILY TREATMENT     [facility] Physical [address].  [address]  [place] [address]  Phone:  [phone]  Fax:  [phone]    Date: 04/24/2019    Patient: Allen Mccabe  YOB: 1955  MRN: [id number]     Time Calculation             Chief Complaint: No chief complaint on file.    Visit #: Visit count could not be calculated. Make sure you are using a visit which is associated with an episode.    SUBJECTIVE:  Pt states he is a little nauseaous today. Low back is letting him know it is there. Neck seems to be okay.     OBJECTIVE:  Current objective measures: decreased L rotation, increased L C3-5 rotational stiffness  Cx ROM: rotation 55 deg to the L, 56 deg to the R. SB creates HA.   VAS: 6/10 worst, 2/10 current          Therapeutic Exercises (CPT 27978):     1. Chin tuck, 5\" x 10 x 1, reviewed    2. Scap retract, 5\" x 10 x 1, reviewed    3. Scap + ER pink, , 10 x 1, reviewed    4. Cx rotation ROM with towel, 10 x 1, reviewed    5. Rows, 5\" x 20 x 1, pink, reviewed    6. Shoulder ext, 5\" x 10 x 1, pink, reviewed    7. Box exer, 10 x 1, reviewed      Therapeutic Exercise Summary: Reviewed HEP above.     Therapeutic Treatments and Modalities:     1. Neuromuscular Re-education (CPT 06742), STM to B UT/cx parasipnals, 15 min. seated distraction with R rotation, 10 x 1.  to T1-5, rotational mobs T1-5, grade 3, 20\" x 3 each.      Time-based treatments/modalities:          ASSESSMENT:   Response to treatment: Pt presents with neck pain and Has. Pt had improved B rotation and release of trigger points after today's session. Pt has overall reduction in neck pain and Has. Skipped traction today on account of nausea per pt request.     PLAN/RECOMMENDATIONS:   Plan for treatment: therapy treatment to continue next visit.  Planned interventions for next visit: continue with current treatment. Repeat neuro (L C3-5) rotation. Focus on upper thoracic.  " Repeat traction, 20lbs.

## 2019-04-26 ENCOUNTER — APPOINTMENT (OUTPATIENT)
Dept: PHYSICAL THERAPY | Facility: REHABILITATION | Age: 64
End: 2019-04-26
Attending: FAMILY MEDICINE
Payer: MEDICAID

## 2019-04-26 ENCOUNTER — TELEPHONE (OUTPATIENT)
Dept: PHYSICAL THERAPY | Facility: REHABILITATION | Age: 64
End: 2019-04-26

## 2019-04-26 NOTE — OP THERAPY DISCHARGE SUMMARY
Outpatient Physical Therapy  DISCHARGE SUMMARY NOTE      [facility] Therapy [address].  [address]  [place] [address]  Phone:  [phone]  Fax:  [phone]    Date of Visit: 04/26/2019    Patient: Allen Mccabe  YOB: 1955  MRN: [id number]     Referring Provider: Michel Triana M.D.   Referring Diagnosis Neck pain of over 3 months duration [M54.2, G89.29]     Physical Therapy Occurrence Codes    Date of onset of impairment:  1/29/19   Date physical therapy care plan established or reviewed:  3/6/19   Date physical therapy treatment started:  3/6/19          Functional Limitations and Severity Modifiers      Goal:     Discharge:         Your patient is being discharged from Physical Therapy with the following comments:   · Insurance denied more visits    Comments:  Pt's insurance denied more visits claiming PT was not deemed medically necessary     Limitations Remaining:  Unable to reasses    Recommendations:  Pt is to d/c to HEP. Pt encouraged to call PT clinic for any f/u questions or suggestions in moving forward as he was denied additional visits.    Babs Ga, PT, DPT    Date: 4/26/2019

## 2019-04-29 ENCOUNTER — TELEPHONE (OUTPATIENT)
Dept: MEDICAL GROUP | Facility: MEDICAL CENTER | Age: 64
End: 2019-04-29

## 2019-04-29 DIAGNOSIS — M54.2 NECK PAIN OF OVER 3 MONTHS DURATION: ICD-10-CM

## 2019-04-29 NOTE — TELEPHONE ENCOUNTER
1. Caller Name: Allen                                         Call Back Number: [phone] (home)         Patient approves a detailed voicemail message: yes    2. SPECIFIC Action To Be Taken: New referral to PT    3. Diagnosis/Clinical Reason for Request: Chronic neck pain     4. Specialty & Provider Name/Lab/Imaging Location: [facility] PT    5. Is appointment scheduled for requested order/referral: no    Patient was informed they will receive a return phone call from the office ONLY if there are any questions before processing their request. Advised to call back if they haven't received a call from the referral department in 5 days.

## 2019-05-01 ENCOUNTER — APPOINTMENT (OUTPATIENT)
Dept: PHYSICAL THERAPY | Facility: REHABILITATION | Age: 64
End: 2019-05-01
Attending: FAMILY MEDICINE
Payer: MEDICAID

## 2019-05-10 ENCOUNTER — APPOINTMENT (OUTPATIENT)
Dept: PHYSICAL THERAPY | Facility: REHABILITATION | Age: 64
End: 2019-05-10
Attending: FAMILY MEDICINE
Payer: MEDICAID

## 2019-05-13 ENCOUNTER — APPOINTMENT (OUTPATIENT)
Dept: PHYSICAL THERAPY | Facility: REHABILITATION | Age: 64
End: 2019-05-13
Attending: FAMILY MEDICINE
Payer: MEDICAID

## 2019-06-22 DIAGNOSIS — R10.2 PELVIC PAIN: ICD-10-CM

## 2019-06-24 RX ORDER — HYDROMORPHONE HYDROCHLORIDE 4 MG/1
4 TABLET ORAL EVERY 6 HOURS PRN
Qty: 45 TAB | Refills: 0 | Status: SHIPPED | OUTPATIENT
Start: 2019-06-24 | End: 2019-08-16 | Stop reason: SDUPTHER

## 2019-07-02 ENCOUNTER — PATIENT MESSAGE (OUTPATIENT)
Dept: MEDICAL GROUP | Facility: MEDICAL CENTER | Age: 64
End: 2019-07-02

## 2019-07-02 DIAGNOSIS — N30.10 INTERSTITIAL CYSTITIS: ICD-10-CM

## 2019-07-02 DIAGNOSIS — G89.4 CHRONIC PAIN SYNDROME: ICD-10-CM

## 2019-07-02 RX ORDER — HYDROCODONE BITARTRATE AND ACETAMINOPHEN 10; 325 MG/1; MG/1
TABLET ORAL
Qty: 112 TAB | Refills: 0 | Status: SHIPPED | OUTPATIENT
Start: 2019-07-12 | End: 2019-07-12 | Stop reason: SDUPTHER

## 2019-07-12 ENCOUNTER — OFFICE VISIT (OUTPATIENT)
Dept: MEDICAL GROUP | Facility: MEDICAL CENTER | Age: 64
End: 2019-07-12
Attending: FAMILY MEDICINE
Payer: MEDICAID

## 2019-07-12 VITALS
RESPIRATION RATE: 16 BRPM | HEIGHT: 66 IN | SYSTOLIC BLOOD PRESSURE: 122 MMHG | DIASTOLIC BLOOD PRESSURE: 66 MMHG | BODY MASS INDEX: 36.32 KG/M2 | HEART RATE: 76 BPM | OXYGEN SATURATION: 93 % | TEMPERATURE: 97.3 F | WEIGHT: 226 LBS

## 2019-07-12 DIAGNOSIS — G89.4 CHRONIC PAIN SYNDROME: ICD-10-CM

## 2019-07-12 DIAGNOSIS — E66.9 OBESITY (BMI 30-39.9): ICD-10-CM

## 2019-07-12 DIAGNOSIS — N30.10 INTERSTITIAL CYSTITIS: ICD-10-CM

## 2019-07-12 PROCEDURE — 99213 OFFICE O/P EST LOW 20 MIN: CPT | Performed by: FAMILY MEDICINE

## 2019-07-12 RX ORDER — HYDROCODONE BITARTRATE AND ACETAMINOPHEN 10; 325 MG/1; MG/1
TABLET ORAL
Qty: 112 TAB | Refills: 0 | Status: SHIPPED | OUTPATIENT
Start: 2019-08-12 | End: 2019-07-12 | Stop reason: SDUPTHER

## 2019-07-12 RX ORDER — HYDROCODONE BITARTRATE AND ACETAMINOPHEN 10; 325 MG/1; MG/1
TABLET ORAL
Qty: 112 TAB | Refills: 0 | Status: SHIPPED | OUTPATIENT
Start: 2019-09-11 | End: 2019-07-12 | Stop reason: SDUPTHER

## 2019-07-12 RX ORDER — HYDROCODONE BITARTRATE AND ACETAMINOPHEN 10; 325 MG/1; MG/1
TABLET ORAL
Qty: 112 TAB | Refills: 0 | Status: SHIPPED | OUTPATIENT
Start: 2019-08-08 | End: 2019-07-19 | Stop reason: SDUPTHER

## 2019-07-12 RX ORDER — HYDROCODONE BITARTRATE AND ACETAMINOPHEN 10; 325 MG/1; MG/1
TABLET ORAL
Qty: 112 TAB | Refills: 0 | Status: SHIPPED | OUTPATIENT
Start: 2019-09-05 | End: 2019-09-10 | Stop reason: SDUPTHER

## 2019-07-12 RX ORDER — HYDROCODONE BITARTRATE AND ACETAMINOPHEN 10; 325 MG/1; MG/1
TABLET ORAL
Qty: 112 TAB | Refills: 0 | Status: SHIPPED | OUTPATIENT
Start: 2019-07-12 | End: 2019-07-12 | Stop reason: SDUPTHER

## 2019-07-12 ASSESSMENT — PATIENT HEALTH QUESTIONNAIRE - PHQ9: CLINICAL INTERPRETATION OF PHQ2 SCORE: 0

## 2019-07-12 NOTE — PROGRESS NOTES
"Subjective:      Allen Mccabe is a 64 y.o. male who presents with Pelvic Pain            HPI 1.  Chronic pain syndrome-patient reports that he is continuing his normal activities with low to moderate levels of pain stemming from his interstitial cystitis pelvic area and increasingly from his low back and at times his mid back pain as well.  He is continuing to use Norco 10/325 4 times daily along with occasional hydromorphone 4 mg for occasional breakthrough pain.  Patient recently switched to health plan of [place] Medicaid from [facility] in order to get coverage at his favorite optometrist, Dr. Porter.  This will generate a prior authorization for his opiate prescription.    ROS denies constipation, focal numbness, focal weakness       Objective:     /66 (BP Location: Left arm, Patient Position: Sitting, BP Cuff Size: Adult)   Pulse 76   Temp 36.3 °C (97.3 °F) (Temporal)   Resp 16   Ht 1.676 m (5' 5.98\")   Wt 102.5 kg (226 lb)   SpO2 93%   BMI 36.50 kg/m²      Physical Exam  Gen.- alert, cooperative, in no acute distress  Neck- midline trachea, thyroid not enlarged or tender,supple, no cervical adenopathy  Chest-clear to auscultation and percussion with normal breath sounds. No retractions. Chest wall nontender  Cardiac- regular rhythm and rate. No murmur, thrill, or heave  Abdomen-1+ tender in the suprapubic area and the extreme right upper quadrant around towards his right flank.  No palpable masses.  Normal bowel sounds.  Liver and spleen are not palpably enlarged          Assessment/Plan:     1. Interstitial cystitis    - HYDROcodone/acetaminophen (NORCO)  MG Tab; TAKE 1 TABLET ORALLY EVERY 6 HOURS IF NEEDED  Dispense: 112 Tab; Refill: 0    2. Chronic pain syndrome    - HYDROcodone/acetaminophen (NORCO)  MG Tab; TAKE 1 TABLET ORALLY EVERY 6 HOURS IF NEEDED  Dispense: 112 Tab; Refill: 0    3.Obesity  Plan: 1.  Renew Norco 10/325, 4-week scripts, 3 written  2.  Follow-up in 2 weeks " regarding expanding back pain  3.  Pursue limited portions to achieve gradual weight

## 2019-07-19 ENCOUNTER — OFFICE VISIT (OUTPATIENT)
Dept: MEDICAL GROUP | Facility: MEDICAL CENTER | Age: 64
End: 2019-07-19
Attending: FAMILY MEDICINE
Payer: MEDICAID

## 2019-07-19 VITALS
TEMPERATURE: 97.2 F | HEIGHT: 66 IN | WEIGHT: 224 LBS | BODY MASS INDEX: 36 KG/M2 | SYSTOLIC BLOOD PRESSURE: 114 MMHG | RESPIRATION RATE: 16 BRPM | OXYGEN SATURATION: 98 % | HEART RATE: 76 BPM | DIASTOLIC BLOOD PRESSURE: 66 MMHG

## 2019-07-19 DIAGNOSIS — M79.18 LUMBAR MUSCLE PAIN: ICD-10-CM

## 2019-07-19 DIAGNOSIS — G89.29 CHRONIC MIDLINE LOW BACK PAIN WITHOUT SCIATICA: ICD-10-CM

## 2019-07-19 DIAGNOSIS — M54.50 CHRONIC MIDLINE LOW BACK PAIN WITHOUT SCIATICA: ICD-10-CM

## 2019-07-19 DIAGNOSIS — N30.10 INTERSTITIAL CYSTITIS: ICD-10-CM

## 2019-07-19 DIAGNOSIS — S29.019A THORACIC MYOFASCIAL STRAIN, INITIAL ENCOUNTER: ICD-10-CM

## 2019-07-19 DIAGNOSIS — G89.4 CHRONIC PAIN SYNDROME: ICD-10-CM

## 2019-07-19 PROCEDURE — 99213 OFFICE O/P EST LOW 20 MIN: CPT | Performed by: FAMILY MEDICINE

## 2019-07-19 RX ORDER — HYDROCODONE BITARTRATE AND ACETAMINOPHEN 10; 325 MG/1; MG/1
TABLET ORAL
Qty: 112 TAB | Refills: 0 | Status: SHIPPED | OUTPATIENT
Start: 2019-07-19 | End: 2019-08-16

## 2019-07-19 NOTE — PROGRESS NOTES
"Subjective:      Allen Mccabe is a 64 y.o. male who presents with Back Pain            HPI 1.  Acute mid back pain-patient reports 3-week history of persistent mid back pain superimposed on persistent previously present low back pain.  Patient reports there is no radiation of pain or numbness into either lower extremity.  He has chronic dysuria associated with interstitial cystitis but that urination situation has not changed.  Bowel movements sound appropriate without any incontinence.  There is been no recent trauma.    ROS negative for urinary bleeding, urinary incontinence, leg edema       Objective:     /66 (BP Location: Left arm, Patient Position: Sitting, BP Cuff Size: Adult)   Pulse 76   Temp 36.2 °C (97.2 °F) (Temporal)   Resp 16   Ht 1.676 m (5' 5.98\")   Wt 101.6 kg (224 lb)   SpO2 98%   BMI 36.17 kg/m²      Physical Exam  General-alert cooperative male in mild distress, moving stiffly  Back-1+ tender from L5-S3 in the midline.  Paraspinous areas are nontender to external palpation.  Chest- clear breath sounds without wheezes, rales, ronchi. No retractions. Chest wall nontender.  Lower extremities-intact light touch bilaterally, intact strength            Assessment/Plan:     1. Thoracic myofascial strain, initial encounter      2. Lumbar muscle pain      3. Chronic midline low back pain without sciatica      4. Interstitial cystitis      5. Chronic pain syndrome    1.  Will institute a course of physical therapy, 12 visits  2.  Continue current medications  3.  Norco 10/325, #112 is rewritten today since his new Medicaid plan limited his prescription last week to only a 7-day fill    "

## 2019-07-30 ENCOUNTER — PHYSICAL THERAPY (OUTPATIENT)
Dept: PHYSICAL THERAPY | Facility: MEDICAL CENTER | Age: 64
End: 2019-07-30
Attending: FAMILY MEDICINE
Payer: MEDICAID

## 2019-07-30 DIAGNOSIS — M79.18 LUMBAR MUSCLE PAIN: ICD-10-CM

## 2019-07-30 DIAGNOSIS — S29.019A THORACIC MYOFASCIAL STRAIN, INITIAL ENCOUNTER: ICD-10-CM

## 2019-07-30 PROCEDURE — 97161 PT EVAL LOW COMPLEX 20 MIN: CPT

## 2019-07-30 ASSESSMENT — ENCOUNTER SYMPTOMS
PAIN TIMING: ALL DAY
PAIN TIMING: CONTINUOUSLY
PAIN SCALE AT LOWEST: 5
QUALITY: ACHING
PAIN SCALE AT HIGHEST: 7
PAIN SCALE: 6

## 2019-07-30 ASSESSMENT — ACTIVITIES OF DAILY LIVING (ADL): POOR_BALANCE: 1

## 2019-07-30 NOTE — OP THERAPY EVALUATION
"  Outpatient Physical Therapy  INITIAL EVALUATION    [facility] Outpatient Physical Therapy  [address]  [place] [address]  Phone:  [phone]  Fax:  [phone]    Date of Evaluation: 07/30/2019    Patient: Allen Mccabe  YOB: 1955  MRN: [id number]     Referring Provider: Michel Triana M.D.  [address] [facility]  [place] [address]   Referring Diagnosis Thoracic myofascial strain, initial encounter [S29.019A];Lumbar muscle pain [M79.18]     Time Calculation  Start time: 1300  Stop time: 1355 Time Calculation (min): 55 minutes     Physical Therapy Occurrence Codes    Date of onset of impairment:  7/19/19   Date physical therapy care plan established or reviewed:  7/30/19   Date physical therapy treatment started:  7/30/19          Chief Complaint: Back Problem    Visit Diagnoses     ICD-10-CM   1. Thoracic myofascial strain, initial encounter S29.019A   2. Lumbar muscle pain M79.18         Subjective:   History of Present Illness:     Mechanism of injury:  Pt \"Allen\" states his back is worse and moved up. It is crawling up his back. He used to feel it more in the low back, but now it is like the upper 1/3 on down. He does not think he did anything to tweak it, just started. He feels like it is just from getting old and has gained weight the last 4-5 months. He is changing his diet to address this. He has been avoiding lifting due to fear of injuring his back. He does not feel like his back pain prevents him from doing his ADLs except for lifting from ground, but just has pain with it. He can still walk 4-5 miles, but it hurts. He uses a lidocaine ointment and hot pad almost daily at this point.     Pt denies n/t in BLE, saddle anesthesia, no changes in b/b.     Pt gets some n/t into the tips of his fingers and thumbs and used to just be when he slept on the arm, and takes a few minutes to go away, but has had some days where he has had numbness in his finger " tips last for like a day, but doesn't seem to be limiting him in anyway.    Pt does have some instances in which he gets lightheaded/nauseous with some movements.  Pain:     Current pain ratin    At best pain ratin    At worst pain ratin    Quality:  Aching    Pain timing:  All day and continuously  Patient Goals:     Patient goals for therapy:  Decreased pain, increased motion and increased strength    Other patient goals:  Decreased HA      Past Medical History:   Diagnosis Date   • Dyshidrotic eczema    • Headache(784.0)    • Hypertension    • Interstitial cystitis    • Obesity    • Pain     throat   • Sprain of neck    • Urinary bladder disorder      Past Surgical History:   Procedure Laterality Date   • BASAL CELL EXCISION Left 2016    Procedure: SQUAMOUS CELL EXCISION EAR AND EXCISION LESION EAR;  Surgeon: Austen Epps M.D.;  Location: SURGERY SURGICAL [facility] ORS;  Service:    • LARYNGOSCOPY  2015    Performed by Michele Maynard M.D. at SURGERY SAME DAY [facility] ORS   • ESOPHAGOSCOPY  2015    Performed by Michele Maynard M.D. at SURGERY SAME DAY [facility] ORS   • CERVICAL FUSION POSTERIOR     • APPENDECTOMY     • CARPAL TUNNEL RELEASE      R   • CYSTOSCOPY     • HERNIA REPAIR      bilat   • OTHER      bladder surgery   • TONSILLECTOMY     • TURP-VAPOR       Social History   Substance Use Topics   • Smoking status: Never Smoker   • Smokeless tobacco: Never Used   • Alcohol use 0.0 oz/week      Comment: rare     Family and Occupational History     Social History   • Marital status: Single     Spouse name: N/A   • Number of children: N/A   • Years of education: N/A       Objective     Hip Screen   Hip range of motion within functional limits.  Hip strength within functional limits with the following exceptions: MMT: 4/5 for B hip flexion, pain in LB with R resisted    Neurological Testing     Reflexes   Left   Patellar (L4): normal (2+)    Right   Patellar (L4): normal  "(2+)    Comments   Left Achilles (S1): unable to ellicit  Right Achilles (S1): unable to elliciit    Myotome testing   Lumbar (left)   L1 (hip flexors): 4  L2 (hip flexors): 4  L3 (knee extensors): 5  L4 (ankle dorsiflexors): 5  S1 (ankle plantar flexors): 5    Lumbar (right)   L1 (hip flexors): 4  L2 (hip flexors): 4  L3 (knee extensors): 5  L4 (ankle dorsiflexors): 5  S1 (ankle plantar flexors): 5    Palpation   Left   No palpable tenderness to the gluteus carina, gluteus medius, lumbar paraspinals, piriformis and quadratus lumborum.     Right   No palpable tenderness to the gluteus carina, gluteus medius, lumbar paraspinals, piriformis and quadratus lumborum.     Active Range of Motion     Lumbar   Flexion: within functional limits (increased low back pain)  Extension: decreased (sharp pain in low back)  Left lateral flexion: decreased (pain on right)  Right lateral flexion: decreased (pain on left)  Left rotation: decreased (low back pain)  Right rotation: decreased (low back pain)    Joint Play   Spine     Central PA Clark        L1: painful and hypomobile       L2: painful and hypomobile       L3: painful and hypomobile       L4: painful and hypomobile       L5: hypomobile       S1: hypomobile      Additional Joint Play Details   TTP with CPA of C7. No TTP to T1-12. Most TTP with  of L2-4.   No TTP with UPAs of T1-12, L1-5. Gross hypomobility of the entire lumbar spine.    Strength:      Left Hip   Planes of Motion   Flexion: 4    Right Hip   Planes of Motion   Flexion: 4    Left Knee   Flexion: 5  Extension: 5    Right Knee   Flexion: 5  Extension: 5    Left Ankle/Foot   Dorsiflexion: 5  Plantar flexion: 5    Right Ankle/Foot   Dorsiflexion: 5  Plantar flexion: 5    Tests       Lumbar spine (left)      Negative slump.   Lumbar spine (right)     Negative slump.     Left Hip   SLR: Negative.     Right Hip   SLR: Negative.         Therapeutic Exercises (CPT 61942):     1. LTR, 10 x 1    2. SKTC, 30\" x 1    " "3. Hamstring supine, 10 x 1    4. ADIM, 5\" x 10 x 1    10. UPOC: 10/22/19    Therapeutic Treatments and Modalities:     1. Mechanical Traction (CPT 27972), 90/70lbs, 60/20\" x 15 min with MHP, pt weighs 220lbs. no charge, applied and removed by aide    Time-based treatments/modalities:  Therapeutic exercise minutes (CPT 37217): 5 minutes       Assessment, Response and Plan:   Impairments: abnormal muscle firing, abnormal or restricted ROM, activity intolerance, impaired balance, impaired physical strength, limited ADL's and pain with function    Assessment details:  Pt is a pleasant, cooperative 63 yo male who presents with bilateral low back pain. Pt has decreased global lx mobility and weakness of core and hips with pain into most lx ROM, which causes him pain with most ADLs and does prevent him from lifting. Pt will benefit from skilled physical therapy in order to strengthen his core and hips, improve his lumbar mobility, and decrease his overall pain in order to return to ADLs and recreational activities with less pain and restriction.  Other barriers to therapy:  Chronicity of pain  Goals:   Short Term Goals:   1. Pt is to be able to perform lx ROM into flexion without increase in back pain  2. Pt is to be able to have 4+/5 into hip flexion bilaterally without increase in pain  3. Pt is to perform HEP without cueing demonstrating compliance.  Short term goal time span:  2-4 weeks      Long Term Goals:    1. Pt is to be able to perform lx ROM into all directions without increase in pain.  2. Pt is to be able to lift 20lbs without increase in pain  3. Pt is to score a 9 or less on the RMQ demonstrating meaningful improvement  Long term goal time span:  2-4 months    Plan:   Therapy options:  Physical therapy treatment to continue  Planned therapy interventions:  Neuromuscular Re-education (CPT 04174), Therapeutic Exercise (CPT 46986) and Mechanical Traction (CPT 74905)  Frequency:  2x week  Duration in weeks:  " 12  Plan details:  Pt to start with PT for 2x/week and will then decrease to 1x/week once HEP is established and pt is making improvements between visits.    1 mech traction (26397), neuro re ed (58653), and 2 therapeutic exer (59676)      Functional Limitations and Severity Modifiers  Gm Juan Pablo Low Back Pain and Disability Score: 29.17   Current:  na   Goal:  na     Referring provider co-signature:  I have reviewed this plan of care and my co-signature certifies the need for services.  Certification Dates:   From 07/30/19     To 10/22/19    Physician Signature: ________________________________ Date: ______________

## 2019-08-01 ENCOUNTER — PHYSICAL THERAPY (OUTPATIENT)
Dept: PHYSICAL THERAPY | Facility: MEDICAL CENTER | Age: 64
End: 2019-08-01
Attending: FAMILY MEDICINE
Payer: MEDICAID

## 2019-08-01 DIAGNOSIS — S29.019A THORACIC MYOFASCIAL STRAIN, INITIAL ENCOUNTER: ICD-10-CM

## 2019-08-01 DIAGNOSIS — G89.29 CHRONIC LOW BACK PAIN WITHOUT SCIATICA, UNSPECIFIED BACK PAIN LATERALITY: ICD-10-CM

## 2019-08-01 DIAGNOSIS — M79.18 LUMBAR MUSCLE PAIN: ICD-10-CM

## 2019-08-01 DIAGNOSIS — M54.50 CHRONIC LOW BACK PAIN WITHOUT SCIATICA, UNSPECIFIED BACK PAIN LATERALITY: ICD-10-CM

## 2019-08-01 PROCEDURE — 97110 THERAPEUTIC EXERCISES: CPT

## 2019-08-01 NOTE — OP THERAPY DAILY TREATMENT
"  Outpatient Physical Therapy  DAILY TREATMENT     [facility] Outpatient Physical Therapy  [address]  [place] [address]  Phone:  [phone]  Fax:  [phone]    Date: 08/01/2019    Patient: Allen Mccabe  YOB: 1955  MRN: [id number]     Time Calculation  Start time: 1530  Stop time: 1620 Time Calculation (min): 50 minutes       Chief Complaint: Back Problem    Visit #: 2    SUBJECTIVE:  Pt states he is well today. Exercises are going okay, little sore, but not bad.     OBJECTIVE:  Current objective measures:           Therapeutic Exercises (CPT 83895):     1. LTR, 10 x 1    2. ADIM, 5\" x 10 x 1    3. ADIM + ball squeeze, 5\" x 10 x 1    4. ADIM + hamstring curl on swiss ball, 10 x 1    5. Clams, 10 x 1    10. UPOC: 10/22/19      Therapeutic Exercise Summary: HEP: LTR, SKTC, hamstring supine, ADIM    Therapeutic Treatments and Modalities:     1. Mechanical Traction (CPT 02443), 100/80lbs, 60/20\" x 15 min with MHP, pt weighs 220lbs. no charge, applied and removed by aide    2. Neuromuscular Re-education (CPT 64445), neural gap mob, L side lying, grade 3, 20\" x 2, pt encouraged to think about relaxing muscles for more of a stretch    Time-based treatments/modalities:  Therapeutic exercise minutes (CPT 85424): 25 minutes  Neuromusc re-ed, balance, coor, post minutes (CPT 35733): 3 minutes         ASSESSMENT:   Response to treatment: Pt presents with bilateral low back pain. Pt able to tolerate additional core exercises without increase in symptoms. Pt felt more stretch with higher pull on traction.    PLAN/RECOMMENDATIONS:   Plan for treatment: therapy treatment to continue next visit.  Planned interventions for next visit: continue with current treatment. Progress to more quadruped if tolerated. Repeat neutral gap, try hip mobs.     [facility] neuro re ed (58159), and 2 therapeutic exer (63122) per session     "

## 2019-08-06 ENCOUNTER — PHYSICAL THERAPY (OUTPATIENT)
Dept: PHYSICAL THERAPY | Facility: MEDICAL CENTER | Age: 64
End: 2019-08-06
Attending: FAMILY MEDICINE
Payer: MEDICAID

## 2019-08-06 DIAGNOSIS — M54.50 CHRONIC LOW BACK PAIN WITHOUT SCIATICA, UNSPECIFIED BACK PAIN LATERALITY: ICD-10-CM

## 2019-08-06 DIAGNOSIS — M54.2 NECK PAIN OF OVER 3 MONTHS DURATION: ICD-10-CM

## 2019-08-06 DIAGNOSIS — S29.019A THORACIC MYOFASCIAL STRAIN, INITIAL ENCOUNTER: ICD-10-CM

## 2019-08-06 DIAGNOSIS — G89.29 CHRONIC LOW BACK PAIN WITHOUT SCIATICA, UNSPECIFIED BACK PAIN LATERALITY: ICD-10-CM

## 2019-08-06 DIAGNOSIS — M79.18 LUMBAR MUSCLE PAIN: ICD-10-CM

## 2019-08-06 PROCEDURE — 97110 THERAPEUTIC EXERCISES: CPT

## 2019-08-06 NOTE — OP THERAPY DAILY TREATMENT
"  Outpatient Physical Therapy  DAILY TREATMENT     [facility] Outpatient Physical Therapy  [address]  [place] [address]  Phone:  [phone]  Fax:  [phone]    Date: 08/06/2019    Patient: Allen Mccabe  YOB: 1955  MRN: [id number]     Time Calculation  Start time: 1500  Stop time: 1550 Time Calculation (min): 50 minutes       Chief Complaint: Back Problem    Visit #: 3    SUBJECTIVE:  Pt states he is stiff and sore, which is his new normal. He has not felt any change since starting PT. Pt is more sore, but no change, still hopeful as it has only been two session.    OBJECTIVE:  Current objective measures:           Therapeutic Exercises (CPT 41838):     1. Cat/cow, 10 x 1, pt has difficulty with hip/pelvis dissociation    2. Child's pose (reg/lat), 30\" x 1 each    3. Seated ball rolls (flex/lat), 5 x 1 each    4. Sit<>stand, 10 x 1    5. Standing hip 3-way, 10 x 2, second set - did all 3 directions in a row versus alternating    6. Rows, pink, 10 x 1    10. UPOC: 10/22/19      Therapeutic Exercise Summary: HEP: LTR, SKTC, hamstring supine, ADIM    Therapeutic Treatments and Modalities:     1. Mechanical Traction (CPT 99345), 110/90lbs, 60/20\" x 15 min with MHP, pt weighs 220lbs. no charge, applied and removed by aide    Time-based treatments/modalities:  Therapeutic exercise minutes (CPT 85896): 25 minutes         ASSESSMENT:   Response to treatment: Pt presents with bilateral low back pain. Pt felt good stretch with lumbar flexion ROM in cat/cow and peter pose. Pt able to tolerate start of standing hip strengthening if able to use HHA. Pt did have increased back pain without HHA, will continue to progress standing hip and core strengthening as tolerated.     PLAN/RECOMMENDATIONS:   Plan for treatment: therapy treatment to continue next visit.  Planned interventions for next visit: continue with current treatment. Progress to more standing if tolerated. " Repeat neutral gap, try hip mobs.     1 [facility] traction ([id number]), neuro re ed ([id number]), and 2 therapeutic exer ([id number]) per session

## 2019-08-08 ENCOUNTER — PHYSICAL THERAPY (OUTPATIENT)
Dept: PHYSICAL THERAPY | Facility: MEDICAL CENTER | Age: 64
End: 2019-08-08
Attending: FAMILY MEDICINE
Payer: MEDICAID

## 2019-08-08 DIAGNOSIS — S29.019A THORACIC MYOFASCIAL STRAIN, INITIAL ENCOUNTER: ICD-10-CM

## 2019-08-08 DIAGNOSIS — M54.50 CHRONIC LOW BACK PAIN WITHOUT SCIATICA, UNSPECIFIED BACK PAIN LATERALITY: ICD-10-CM

## 2019-08-08 DIAGNOSIS — G89.29 CHRONIC LOW BACK PAIN WITHOUT SCIATICA, UNSPECIFIED BACK PAIN LATERALITY: ICD-10-CM

## 2019-08-08 DIAGNOSIS — M54.2 NECK PAIN OF OVER 3 MONTHS DURATION: ICD-10-CM

## 2019-08-08 DIAGNOSIS — M79.18 LUMBAR MUSCLE PAIN: ICD-10-CM

## 2019-08-08 PROCEDURE — 97140 MANUAL THERAPY 1/> REGIONS: CPT

## 2019-08-08 PROCEDURE — 97110 THERAPEUTIC EXERCISES: CPT

## 2019-08-08 NOTE — OP THERAPY DAILY TREATMENT
"  Outpatient Physical Therapy  DAILY TREATMENT     [facility] Outpatient Physical Therapy  [address]  [place] [address]  Phone:  [phone]  Fax:  [phone]    Date: 08/08/2019    Patient: Allen Mccabe  YOB: 1955  MRN: [id number]     Time Calculation  Start time: 1500  Stop time: 1550 Time Calculation (min): 50 minutes       Chief Complaint: Back Problem    Visit #: 4    SUBJECTIVE:  Pt states somehow he tweaked his back. He is not sure if it was the exercises or the stretching machine. He is feeling the pain more in the lower back, both sides, maybe a little more on the right.       OBJECTIVE:  Current objective measures:   Pain with  and pressure to B quadratus          Therapeutic Exercises (CPT 97807):     3. Seated ball rolls (flex/lat), 5 x 1 each    10. UPOC: 10/22/19      Therapeutic Exercise Summary: HEP: LTR, SKTC, hamstring supine, ADIM    Reviewed current HEP and instructed to slowly work back into exercises as tolerated.    Therapeutic Treatments and Modalities:     1. Manual Therapy (CPT 79815), see below    2. E Stim Unattended (CPT 65564), ifc to low back with MHP x 15 min, no charge, applied and removed by aide    Therapeutic Treatment and Modalities Summary: MT:   -gentle CPA to L1-5, grade 2, 20\" x 2 each  -UPA to L1-5, grade 2, 20\" x 2 each  -STM to B lumbar p/s, 10 min    Time-based treatments/modalities:  Manual therapy minutes (CPT 60014): 20 minutes  Therapeutic exercise minutes (CPT 23689): 10 minutes         ASSESSMENT:   Response to treatment: Pt presents with bilateral low back pain. Pt had increased pain following last session, unclear if it was due to exercises, traction, or something in his life. Pt instructed to return to current HEP as tolerance and did manual and estim to decrease pain in today's session.    Pt may benefit from back brace to help provide support to core and low back for improved functional tolerance as " he works to build his core strength.    PLAN/RECOMMENDATIONS:   Plan for treatment: therapy treatment to continue next visit.  Planned interventions for next visit: continue with current treatment. Progress to more standing if tolerated. Repeat neutral gap, try hip mobs.     1 [facility] traction ([id number]), neuro re ed (72121), and 2 therapeutic exer (58542) per session

## 2019-08-10 ENCOUNTER — TELEPHONE (OUTPATIENT)
Dept: MEDICAL GROUP | Facility: MEDICAL CENTER | Age: 64
End: 2019-08-10

## 2019-08-10 NOTE — TELEPHONE ENCOUNTER
DOCUMENTATION OF PAR STATUS:    1. Name of Medication & Dose: Lidocaine Ointment 5%     2. Name of Prescription Coverage Company & phone #: [employer]    3. Date Prior Auth Submitted: 8/10/19    4. What information was given to obtain insurance decision? Office notes, Med Hx, Dx    5. Prior Auth Status? Pending    6. Patient Notified: yes

## 2019-08-13 ENCOUNTER — PHYSICAL THERAPY (OUTPATIENT)
Dept: PHYSICAL THERAPY | Facility: MEDICAL CENTER | Age: 64
End: 2019-08-13
Attending: FAMILY MEDICINE
Payer: MEDICAID

## 2019-08-13 DIAGNOSIS — M54.50 CHRONIC LOW BACK PAIN WITHOUT SCIATICA, UNSPECIFIED BACK PAIN LATERALITY: ICD-10-CM

## 2019-08-13 DIAGNOSIS — G89.29 CHRONIC LOW BACK PAIN WITHOUT SCIATICA, UNSPECIFIED BACK PAIN LATERALITY: ICD-10-CM

## 2019-08-13 DIAGNOSIS — M79.18 LUMBAR MUSCLE PAIN: ICD-10-CM

## 2019-08-13 DIAGNOSIS — S29.019A THORACIC MYOFASCIAL STRAIN, INITIAL ENCOUNTER: ICD-10-CM

## 2019-08-13 PROCEDURE — 97110 THERAPEUTIC EXERCISES: CPT

## 2019-08-13 RX ORDER — LIDOCAINE 40 MG/G
1 CREAM TOPICAL ONCE
Qty: 45 G | Refills: 3 | Status: SHIPPED | OUTPATIENT
Start: 2019-08-13 | End: 2019-08-13

## 2019-08-13 NOTE — OP THERAPY DAILY TREATMENT
"  Outpatient Physical Therapy  DAILY TREATMENT     [facility] Outpatient Physical Therapy  [address]  [place] [address]  Phone:  [phone]  Fax:  [phone]    Date: 08/13/2019    Patient: Allen Mccabe  YOB: 1955  MRN: [id number]     Time Calculation  Start time: 1200  Stop time: 1250 Time Calculation (min): 50 minutes       Chief Complaint: Back Problem    Visit #: 5    SUBJECTIVE:  Pt states he is not great. The only thing he is really comfortable with is the towel slides. Sunday and yesterday he had more of a pain down his leg into his mid thigh on the R. He has increased his pain medication. The exercises make him more painful.    He has R achiness in his R shoulder blade.     He sees his doctor on Friday, and he would like to hold off on PT on Thursday until after he sees his physician.       OBJECTIVE:  Current objective measures:   Pain with  and pressure to B quadratus          Therapeutic Exercises (CPT 96285):     1. LTR, 10 x 1    2. SKTC, 20\" x 3    3. Hamstring glide, 10 x 1    4. ADIM, 5\" x 10 x 1    5. ADIM + ball squeeze, 5\" x 10 x 1    6. ADIM + BKFO, 10 x 1    10. UPOC: 10/22/19      Therapeutic Exercise Summary: HEP: LTR, SKTC, hamstring supine, ADIM, ADIM + ball squeeze, seated ball rolls    Therapeutic Treatments and Modalities:     2. E Stim Unattended (CPT 59644), ifc to low back with MHP x 15 min, no charge, applied and removed by aide    Time-based treatments/modalities:  Therapeutic exercise minutes (CPT 67554): 25 minutes         ASSESSMENT:   Response to treatment: Pt presents with bilateral low back pain. Pt had increased pain overall and seems worse with exercises. Pt instructed to d/c more recent exercises and regressed back to early exercises which were reviewed in today's session. Pt has f/u with physician and will decide to continue with PT or not after this visit.    Pt may benefit from back brace to help provide " support to core and low back for improved functional tolerance as he works to build his core strength.    PLAN/RECOMMENDATIONS:   Plan for treatment: therapy treatment to continue next visit.  Planned interventions for next visit: continue with current treatment. Ask  visit     1 [facility] traction ([id number]), neuro re ed ([id number]), and 2 therapeutic exer ([id number]) per session

## 2019-08-13 NOTE — TELEPHONE ENCOUNTER
FINAL PRIOR AUTHORIZATION STATUS:    1.  Name of Medication & Dose: Lidocaine 5% Ointment     2. Prior Auth Status: Denied.  Reason: Pt needs to have tried and failed at least 1 coverd medication. either Lidocaine 4% or Lidocaine 3% cream. (see scanned media)    3. Action Taken: Pharmacy Notified: yes Patient Notified: yes

## 2019-08-15 ENCOUNTER — APPOINTMENT (OUTPATIENT)
Dept: PHYSICAL THERAPY | Facility: MEDICAL CENTER | Age: 64
End: 2019-08-15
Attending: FAMILY MEDICINE
Payer: MEDICAID

## 2019-08-16 ENCOUNTER — OFFICE VISIT (OUTPATIENT)
Dept: MEDICAL GROUP | Facility: MEDICAL CENTER | Age: 64
End: 2019-08-16
Attending: FAMILY MEDICINE
Payer: MEDICAID

## 2019-08-16 VITALS
BODY MASS INDEX: 36 KG/M2 | RESPIRATION RATE: 16 BRPM | TEMPERATURE: 97.8 F | OXYGEN SATURATION: 96 % | HEART RATE: 84 BPM | WEIGHT: 224 LBS | DIASTOLIC BLOOD PRESSURE: 60 MMHG | SYSTOLIC BLOOD PRESSURE: 122 MMHG | HEIGHT: 66 IN

## 2019-08-16 DIAGNOSIS — G89.29 CHRONIC RIGHT-SIDED LOW BACK PAIN WITHOUT SCIATICA: ICD-10-CM

## 2019-08-16 DIAGNOSIS — M54.50 CHRONIC RIGHT-SIDED LOW BACK PAIN WITHOUT SCIATICA: ICD-10-CM

## 2019-08-16 DIAGNOSIS — R10.2 PELVIC PAIN: ICD-10-CM

## 2019-08-16 DIAGNOSIS — S29.019D THORACIC MYOFASCIAL STRAIN, SUBSEQUENT ENCOUNTER: ICD-10-CM

## 2019-08-16 PROCEDURE — 99213 OFFICE O/P EST LOW 20 MIN: CPT | Performed by: FAMILY MEDICINE

## 2019-08-16 RX ORDER — HYDROMORPHONE HYDROCHLORIDE 4 MG/1
4 TABLET ORAL EVERY 6 HOURS PRN
Qty: 45 TAB | Refills: 0 | Status: SHIPPED | OUTPATIENT
Start: 2019-08-16 | End: 2019-10-22 | Stop reason: SDUPTHER

## 2019-08-16 NOTE — PROGRESS NOTES
"Subjective:      Allen Mccabe is a 64 y.o. male who presents with Back Pain            HPI 1.  Chronic back pain-patient continues to be dealing with significant low back pain and recently more significant mid back pain as well.  He did have 2 days of some pain in the medial right thigh which cleared spontaneously in the past week.  He is not incontinent of urine or stool.  He has been attending physical therapy and did 5 visits which included traction device.  About 2 days after his fifth visit he suddenly had a dramatic increase in low back pain.  They have since taken the traction out of his treatment plan.  He still has 7 or 8 visits left.  The also did add a TENS unit.  Patient is curious about whether he might benefit from a corset type of lumbar brace-I think he would.    ROS negative for leg weakness, leg edema, rash       Objective:     /60 (BP Location: Left arm, Patient Position: Sitting, BP Cuff Size: Adult)   Pulse 84   Temp 36.6 °C (97.8 °F)   Resp 16   Ht 1.676 m (5' 5.98\")   Wt 101.6 kg (224 lb)   SpO2 96%   BMI 36.17 kg/m²      Physical Exam  General-alert cooperative male in mild distress who does move up and down slowly.  He is not using a cane or a walker.  Back-1+ tender in the midline from approximately T8-S1.  Mild paralumbar tenderness more on the right than the left side.  No overlying redness or swelling  Lower extremities-intact light touch, intact strength.  Negative straight raising bilaterally     Lumbar MRI-2016-3 different disc protrusions or herniations but not impacting any significant structures     Assessment/Plan:     1. Thoracic myofascial strain, subsequent encounter      2. Chronic right-sided low back pain without sciatica    Plan: 1.  Continue with current moderate to narcotic support, physical therapy, striving towards effective sustained weight loss, and trial of lumbar corset  2.  Revisit in 1 month  "

## 2019-08-19 ENCOUNTER — TELEPHONE (OUTPATIENT)
Dept: MEDICAL GROUP | Facility: MEDICAL CENTER | Age: 64
End: 2019-08-19

## 2019-08-19 ENCOUNTER — PHYSICAL THERAPY (OUTPATIENT)
Dept: PHYSICAL THERAPY | Facility: REHABILITATION | Age: 64
End: 2019-08-19
Attending: FAMILY MEDICINE
Payer: MEDICAID

## 2019-08-19 DIAGNOSIS — M54.2 NECK PAIN OF OVER 3 MONTHS DURATION: ICD-10-CM

## 2019-08-19 DIAGNOSIS — S29.019A THORACIC MYOFASCIAL STRAIN, INITIAL ENCOUNTER: ICD-10-CM

## 2019-08-19 PROCEDURE — 97110 THERAPEUTIC EXERCISES: CPT

## 2019-08-19 NOTE — TELEPHONE ENCOUNTER
FINAL PRIOR AUTHORIZATION STATUS:    1.  Name of Medication & Dose: HYDROmorphone     2. Prior Auth Status: Canceled: Hydromorphone 4 MG, 45 tabs per 30 days for chronic back pain (long term pain) foes not require prior authorization as the medication is currently on the formulary.    3. Action Taken: Pharmacy Notified: yes Patient Notified: yes

## 2019-08-19 NOTE — TELEPHONE ENCOUNTER
MEDICATION PRIOR AUTHORIZATION NEEDED:    1. Name of Medication: HYDROmorphone (DILAUDID) 4 MG Tab    2. Requested By (Name of Pharmacy):      3. Is insurance on file current?     4. What is the name & phone number of the 3rd party payor?

## 2019-08-19 NOTE — OP THERAPY DAILY TREATMENT
"  Outpatient Physical Therapy  DAILY TREATMENT     [facility] Outpatient Physical Therapy  [address]  [place] [address]  Phone:  [phone]  Fax:  [phone]    Date: 08/19/2019    Patient: Allen Mccabe  YOB: 1955  MRN: [id number]     Time Calculation  Start time: 1150  Stop time: 1240 Time Calculation (min): 50 minutes       Chief Complaint: Back Problem    Visit #: 6    SUBJECTIVE:  Pt states he is still sore, but hasn't gotten any worse so feels like that is good. He saw the doc on Friday and he is sending in an order for the back brace. The doc does not want to increase his pain medication at this time.       OBJECTIVE:  Current objective measures:   Pain with  and pressure to B quadratus          Therapeutic Exercises (CPT 12794):     1. LTR, 10 x 1    2. SKTC, 20\" x 3    4. ADIM, 5\" x 10 x 1    5. ADIM + hamstring curl - on ball, 10 x 1    6. ADIM + SLR, 10 x 1    7. PPT + APT (3D pelvis), 10 x 1 each    15. UPOC: 10/22/19      Therapeutic Exercise Summary: HEP: LTR, SKTC, hamstring supine, ADIM, ADIM + ball squeeze, seated ball rolls    Therapeutic Treatments and Modalities:     1. Neuromuscular Re-education (CPT 04183), lumbar traction, grade 3, 20\" x 5 - w/ belt, spoke to pt about concentrated on relaxing muslces throughout    2. E Stim Unattended (CPT 01333), ifc to low back with MHP x 15 min, no charge, applied and removed by aide    Time-based treatments/modalities:  Therapeutic exercise minutes (CPT 41156): 23 minutes  Neuromusc re-ed, balance, coor, post minutes (CPT 86023): 5 minutes         ASSESSMENT:   Response to treatment: Pt presents with bilateral low back pain. Pt able to tolerate additional ADIM exercises with ball without increase in pain. Pt felt slight pull with manual traction without increased symptoms, will check in next session as well.      PLAN/RECOMMENDATIONS:   Plan for treatment: therapy treatment to continue next " visit.  Planned interventions for next visit: continue with current treatment. Progress to seated core.     1 [facility] traction ([id number]), neuro re ed (27608), and 2 therapeutic exer (72768) per session

## 2019-08-21 ENCOUNTER — PHYSICAL THERAPY (OUTPATIENT)
Dept: PHYSICAL THERAPY | Facility: REHABILITATION | Age: 64
End: 2019-08-21
Attending: FAMILY MEDICINE
Payer: MEDICAID

## 2019-08-21 DIAGNOSIS — M79.18 LUMBAR MUSCLE PAIN: ICD-10-CM

## 2019-08-21 DIAGNOSIS — S29.019A THORACIC MYOFASCIAL STRAIN, INITIAL ENCOUNTER: ICD-10-CM

## 2019-08-21 DIAGNOSIS — M54.50 CHRONIC LOW BACK PAIN WITHOUT SCIATICA, UNSPECIFIED BACK PAIN LATERALITY: ICD-10-CM

## 2019-08-21 DIAGNOSIS — G89.29 CHRONIC LOW BACK PAIN WITHOUT SCIATICA, UNSPECIFIED BACK PAIN LATERALITY: ICD-10-CM

## 2019-08-21 PROCEDURE — 97110 THERAPEUTIC EXERCISES: CPT

## 2019-08-21 NOTE — OP THERAPY DAILY TREATMENT
Outpatient Physical Therapy  DAILY TREATMENT     [facility] Outpatient Physical Therapy  [address]  [place] [address]  Phone:  [phone]  Fax:  [phone]    Date: 08/21/2019    Patient: Allen Mccabe  YOB: 1955  MRN: [id number]     Time Calculation  Start time: 0930  Stop time: 1020 Time Calculation (min): 50 minutes       Chief Complaint: Back Problem    Visit #: 7    SUBJECTIVE:  Pt states his upper back and shoulder are feeling better. Lower back is about the same, maybe back to status quo from when he started.       OBJECTIVE:  Current objective measures:   Pain with  and pressure to B quadratus          Therapeutic Exercises (CPT 68518):     1. PPT + APT (3D pelvis), 10 x 1 each    2. Seated ball rolls - lateral, 10 x 1    3. Sit<>stand, 10 x 1, VCs to hip hinge    4. Standing hip abd/extension, 5 x 3, started with HHA, worked to no HHA    5. Multifidus WO, pink, 10 x 1    6. Side step in slight squat, 10 x 1    15. UPOC: 10/22/19      Therapeutic Exercise Summary: HEP: LTR, SKTC, hamstring supine, ADIM, ADIM + ball squeeze, seated ball rolls    Therapeutic Treatments and Modalities:     2. E Stim Unattended (CPT 51416), ifc to low back with MHP x 15 min, no charge, applied and removed by aide    Time-based treatments/modalities:  Therapeutic exercise minutes (CPT 43043): 25 minutes         ASSESSMENT:   Response to treatment: Pt presents with bilateral low back pain. Pt able to tolerate progress to standing exercises without increase in symptoms. He has difficulty performing standing exercises without HHA, but can do so without increase in symptoms.       PLAN/RECOMMENDATIONS:   Plan for treatment: therapy treatment to continue next visit.  Planned interventions for next visit: continue with current treatment. Progress to more standing core/strengthening     1 [facility] traction ([id number]), neuro re ed ([id number]), and 2 therapeutic exer (21251) per  session

## 2019-08-26 ENCOUNTER — PHYSICAL THERAPY (OUTPATIENT)
Dept: PHYSICAL THERAPY | Facility: REHABILITATION | Age: 64
End: 2019-08-26
Attending: FAMILY MEDICINE
Payer: MEDICAID

## 2019-08-26 DIAGNOSIS — M79.18 LUMBAR MUSCLE PAIN: ICD-10-CM

## 2019-08-26 DIAGNOSIS — G89.29 CHRONIC LOW BACK PAIN WITHOUT SCIATICA, UNSPECIFIED BACK PAIN LATERALITY: ICD-10-CM

## 2019-08-26 DIAGNOSIS — M54.50 CHRONIC LOW BACK PAIN WITHOUT SCIATICA, UNSPECIFIED BACK PAIN LATERALITY: ICD-10-CM

## 2019-08-26 DIAGNOSIS — S29.019A THORACIC MYOFASCIAL STRAIN, INITIAL ENCOUNTER: ICD-10-CM

## 2019-08-26 PROCEDURE — 97110 THERAPEUTIC EXERCISES: CPT

## 2019-08-26 NOTE — OP THERAPY DAILY TREATMENT
"  Outpatient Physical Therapy  DAILY TREATMENT     [facility] Outpatient Physical Therapy  [address]  [place] [address]  Phone:  [phone]  Fax:  [phone]    Date: 08/26/2019    Patient: Allen Mccabe  YOB: 1955  MRN: [id number]     Time Calculation  Start time: 1000  Stop time: 1050 Time Calculation (min): 50 minutes       Chief Complaint: Back Problem    Visit #: 8    SUBJECTIVE:  Pt states his upper back is better, but his low back is okay. His low back is maybe a little better, but still not great.       OBJECTIVE:  Current objective measures:   Pain with  and pressure to B quadratus          Therapeutic Exercises (CPT 74489):     2. Seated ball rolls - lateral, 10 x 1    3. Sit<>stand, 10 x 1, VCs to hip hinge    4. Standing hip abd/extension, 5 x 3, started with HHA, worked to no HHA    5. Multifidus WO, pink, 10 x 1    6. Monster walk, pink around knees, 10ft x 3 lengths    7. SL raised on ball, 30\" x 2    8. Counter - flexion/extension stretch, 10 x 2\" x 1    15. UPOC: 10/22/19      Therapeutic Exercise Summary: HEP: LTR, SKTC, hamstring supine, ADIM, ADIM + ball squeeze, seated ball rolls    Therapeutic Treatments and Modalities:     2. E Stim Unattended (CPT 72689), ifc to low back with MHP x 15 min, no charge, applied and removed by aide    Time-based treatments/modalities:  Therapeutic exercise minutes (CPT 77390): 25 minutes         ASSESSMENT:   Response to treatment: Pt presents with bilateral low back pain. Pt able to tolerate additional standing exercises without increase in pain, felt good stretch with standing lumbar flexion/extension at counter.       PLAN/RECOMMENDATIONS:   Plan for treatment: therapy treatment to continue next visit.  Planned interventions for next visit: continue with current treatment. More balance.     1 [facility] ([id number]), neuro re ed (37645), and 2 therapeutic exer (04118) per session     "

## 2019-08-28 ENCOUNTER — PHYSICAL THERAPY (OUTPATIENT)
Dept: PHYSICAL THERAPY | Facility: REHABILITATION | Age: 64
End: 2019-08-28
Attending: FAMILY MEDICINE
Payer: MEDICAID

## 2019-08-28 DIAGNOSIS — M79.18 LUMBAR MUSCLE PAIN: ICD-10-CM

## 2019-08-28 DIAGNOSIS — G89.29 CHRONIC LOW BACK PAIN WITHOUT SCIATICA, UNSPECIFIED BACK PAIN LATERALITY: ICD-10-CM

## 2019-08-28 DIAGNOSIS — M54.50 CHRONIC LOW BACK PAIN WITHOUT SCIATICA, UNSPECIFIED BACK PAIN LATERALITY: ICD-10-CM

## 2019-08-28 DIAGNOSIS — S29.019A THORACIC MYOFASCIAL STRAIN, INITIAL ENCOUNTER: ICD-10-CM

## 2019-08-28 DIAGNOSIS — M54.2 NECK PAIN OF OVER 3 MONTHS DURATION: ICD-10-CM

## 2019-08-28 PROCEDURE — 97110 THERAPEUTIC EXERCISES: CPT

## 2019-08-28 NOTE — OP THERAPY DAILY TREATMENT
"  Outpatient Physical Therapy  DAILY TREATMENT     [facility] Outpatient Physical Therapy  [address]  [place] [address]  Phone:  [phone]  Fax:  [phone]    Date: 08/28/2019    Patient: Allen Mccabe  YOB: 1955  MRN: [id number]     Time Calculation  Start time: 0900  Stop time: 0930 Time Calculation (min): 30 minutes       Chief Complaint: Back Problem    Visit #: 9    SUBJECTIVE:  Pt states he was feeling pretty good until he got off the couch this morning and now he has increased discomfort in his lower back. Upper back is doing well.      OBJECTIVE:  Current objective measures:   Pain with  and pressure to B quadratus          Therapeutic Exercises (CPT 12875):     1. SKTC, 20\" x 2    2. LTR, 10 x 1     3. Piriformis, 20\" x 1    4. Hamstring curl on ball w/ ADIM, 10 x 1    5. Hamstring glide, 10 x 1    6. Seated ball rolls - lateral, 10 x 1    7. Narrow stance on foam (EO/EC), 30\" x 2    8. Wobble board (fwd/lat), 30\" x 2    15. UPOC: 10/22/19      Therapeutic Exercise Summary: HEP: LTR, SKTC, hamstring supine, ADIM, ADIM + ball squeeze, seated ball rolls    Therapeutic Treatments and Modalities:     2. E Stim Unattended (CPT 11514), ifc to low back with MHP x 15 min, no charge, applied and removed by aide    Time-based treatments/modalities:  Therapeutic exercise minutes (CPT 98355): 25 minutes         ASSESSMENT:   Response to treatment: Pt presents with bilateral low back pain. Pt had increased pain this morning after going from standing after sitting for 30 min. Pt had decreased back pain following today's exercises and instructed to focus on gentle mobility exercises for the rest of the day to continue to promote movement and blood flow.        PLAN/RECOMMENDATIONS:   Plan for treatment: therapy treatment to continue next visit.  Planned interventions for next visit: continue with current treatment. More balance.     1 [facility] traction ([id number]), " neuro re ed ([id number]), and 2 therapeutic exer ([id number]) per session

## 2019-09-03 ENCOUNTER — PHYSICAL THERAPY (OUTPATIENT)
Dept: PHYSICAL THERAPY | Facility: MEDICAL CENTER | Age: 64
End: 2019-09-03
Attending: FAMILY MEDICINE
Payer: MEDICAID

## 2019-09-03 DIAGNOSIS — G89.29 CHRONIC LOW BACK PAIN WITHOUT SCIATICA, UNSPECIFIED BACK PAIN LATERALITY: ICD-10-CM

## 2019-09-03 DIAGNOSIS — S29.019A THORACIC MYOFASCIAL STRAIN, INITIAL ENCOUNTER: ICD-10-CM

## 2019-09-03 DIAGNOSIS — M79.18 LUMBAR MUSCLE PAIN: ICD-10-CM

## 2019-09-03 DIAGNOSIS — M54.2 NECK PAIN OF OVER 3 MONTHS DURATION: ICD-10-CM

## 2019-09-03 DIAGNOSIS — M54.50 CHRONIC LOW BACK PAIN WITHOUT SCIATICA, UNSPECIFIED BACK PAIN LATERALITY: ICD-10-CM

## 2019-09-03 PROCEDURE — 97110 THERAPEUTIC EXERCISES: CPT

## 2019-09-03 NOTE — OP THERAPY DAILY TREATMENT
"  Outpatient Physical Therapy  DAILY TREATMENT     [facility] Outpatient Physical Therapy  [address]  [place] [address]  Phone:  [phone]  Fax:  [phone]    Date: 09/03/2019    Patient: Allen Mccabe  YOB: 1955  MRN: [id number]     Time Calculation  Start time: 1030  Stop time: 1120 Time Calculation (min): 50 minutes       Chief Complaint: Back Problem    Visit #: 10    SUBJECTIVE:  Pt states his upper back continues to do well. His low back continues to bother him. His lidocaine patches are no longer approved by his insurance so has had more pain.       OBJECTIVE:  Current objective measures:   Pain with  and pressure to B quadratus          Therapeutic Exercises (CPT 25493):     1. SKTC, 20\" x 2    2. LTR, 10 x 1     3. Seated ball rolls - lateral, 10 x 1    4. Standing hip abduction/extension, 10 x 1 each    5. Standing toe taps, 4\", 10 x 1, VCs to engage hips and decrease trunk SB    6. Tandem (reg/head turns/EC), 30\" x 1 each    7. Standing counter flexion/extension stretch, 5 x 1    15. UPOC: 10/22/19      Therapeutic Exercise Summary: HEP: LTR, SKTC, hamstring supine, ADIM, ADIM + ball squeeze, seated ball rolls    Therapeutic Treatments and Modalities:     2. E Stim Unattended (CPT 08641), ifc to low back with MHP x 15 min, no charge, applied and removed by aide    Time-based treatments/modalities:  Therapeutic exercise minutes (CPT 66208): 25 minutes         ASSESSMENT:   Response to treatment: Pt presents with bilateral low back pain. Pt able to tolerate additional balance exercises without increased pain, but continues to have limitations in his low back pain that limits what he can do functionally.       PLAN/RECOMMENDATIONS:   Plan for treatment: therapy treatment to continue next visit.  Planned interventions for next visit: continue with current treatment. More balance.     [facility][id number]), neuro re ed (64232), and 2 therapeutic " exer (23788) per session

## 2019-09-05 ENCOUNTER — APPOINTMENT (OUTPATIENT)
Dept: PHYSICAL THERAPY | Facility: MEDICAL CENTER | Age: 64
End: 2019-09-05
Attending: FAMILY MEDICINE
Payer: MEDICAID

## 2019-09-10 ENCOUNTER — OFFICE VISIT (OUTPATIENT)
Dept: MEDICAL GROUP | Facility: MEDICAL CENTER | Age: 64
End: 2019-09-10
Attending: FAMILY MEDICINE
Payer: MEDICAID

## 2019-09-10 ENCOUNTER — PHYSICAL THERAPY (OUTPATIENT)
Dept: PHYSICAL THERAPY | Facility: MEDICAL CENTER | Age: 64
End: 2019-09-10
Attending: FAMILY MEDICINE
Payer: MEDICAID

## 2019-09-10 VITALS
OXYGEN SATURATION: 96 % | SYSTOLIC BLOOD PRESSURE: 112 MMHG | RESPIRATION RATE: 16 BRPM | DIASTOLIC BLOOD PRESSURE: 70 MMHG | WEIGHT: 222 LBS | HEART RATE: 64 BPM | BODY MASS INDEX: 35.68 KG/M2 | HEIGHT: 66 IN | TEMPERATURE: 97.4 F

## 2019-09-10 DIAGNOSIS — M54.50 CHRONIC LOW BACK PAIN WITHOUT SCIATICA, UNSPECIFIED BACK PAIN LATERALITY: ICD-10-CM

## 2019-09-10 DIAGNOSIS — M79.18 LUMBAR MUSCLE PAIN: ICD-10-CM

## 2019-09-10 DIAGNOSIS — G89.4 CHRONIC PAIN SYNDROME: ICD-10-CM

## 2019-09-10 DIAGNOSIS — N30.10 INTERSTITIAL CYSTITIS: ICD-10-CM

## 2019-09-10 DIAGNOSIS — S29.019D THORACIC MYOFASCIAL STRAIN, SUBSEQUENT ENCOUNTER: ICD-10-CM

## 2019-09-10 DIAGNOSIS — S29.019A THORACIC MYOFASCIAL STRAIN, INITIAL ENCOUNTER: ICD-10-CM

## 2019-09-10 DIAGNOSIS — G89.29 CHRONIC LOW BACK PAIN WITHOUT SCIATICA, UNSPECIFIED BACK PAIN LATERALITY: ICD-10-CM

## 2019-09-10 PROCEDURE — 97014 ELECTRIC STIMULATION THERAPY: CPT

## 2019-09-10 PROCEDURE — 99213 OFFICE O/P EST LOW 20 MIN: CPT | Performed by: FAMILY MEDICINE

## 2019-09-10 PROCEDURE — 97110 THERAPEUTIC EXERCISES: CPT

## 2019-09-10 RX ORDER — HYDROCODONE BITARTRATE AND ACETAMINOPHEN 10; 325 MG/1; MG/1
TABLET ORAL
Qty: 112 TAB | Refills: 0 | Status: SHIPPED | OUTPATIENT
Start: 2019-10-03 | End: 2019-09-10 | Stop reason: SDUPTHER

## 2019-09-10 RX ORDER — HYDROCODONE BITARTRATE AND ACETAMINOPHEN 10; 325 MG/1; MG/1
TABLET ORAL
Qty: 112 TAB | Refills: 0 | Status: SHIPPED | OUTPATIENT
Start: 2019-10-31 | End: 2019-11-22 | Stop reason: SDUPTHER

## 2019-09-10 NOTE — PROGRESS NOTES
"Subjective:      Allen Mccabe is a 64 y.o. male who presents with Back Pain            HPI 1.  Back pain-patient reports since engaging in physical therapy he has had a significant reduction in the intensity of upper back pain in the thoracic levels and a partial reduction in the intensity of lower back pain as well.  Patient reports that he is stiff and fairly sore first thing in the morning then pain levels improve over several hours and start to build back up again in the evening when he becomes less active.  He is continuing to follow the prescribed instructions for Norco with occasional use of Dilaudid for more severe breakthrough pain.  No current constipation issues or confusion.  Patient would like to be able to obtain more of the lidocaine ointment.  5% prescription was denied.  Patient is going to file an appeal.    ROS negative for urinary incontinence, fecal incontinence, altered gait, recent falls       Objective:     /70   Pulse 64   Temp 36.3 °C (97.4 °F)   Resp 16   Ht 1.676 m (5' 5.98\")   Wt 100.7 kg (222 lb)   SpO2 96%   BMI 35.85 kg/m²      Physical Exam  General-alert cooperative male in no acute distress  Back-nontender to palpation over the midline thoracic structures.  1+ tender over the midline lumbar structures.  Paraspinous areas at all levels are nontender this afternoon.  Neck- trachea is midline, thyroid is symmetric and nontender, no cervical adenopathy, supple          Assessment/Plan:     1. Interstitial cystitis    - Comp Metabolic Panel; Future  - HYDROcodone/acetaminophen (NORCO)  MG Tab; TAKE 1 TABLET ORALLY EVERY 6 HOURS IF NEEDED  Dispense: 112 Tab; Refill: 0    2. Chronic pain syndrome      3. Thoracic myofascial strain, subsequent encounter      4. Lumbar muscle pain    Plan: 1.  Patient will complete current round of physical therapy in 2 days  2.  Continue prescribed use of Norco, 2 additional months prescriptions written with a follow-up visit in " just under 3 months  3.  Rx lidocaine 4% gel

## 2019-09-10 NOTE — OP THERAPY DAILY TREATMENT
"  Outpatient Physical Therapy  DAILY TREATMENT     [facility] Outpatient Physical Therapy  [address]  [place] [address]  Phone:  [phone]  Fax:  [phone]    Date: 09/10/2019    Patient: Allen Mccabe  YOB: 1955  MRN: [id number]     Time Calculation  Start time: 1030  Stop time: 1120 Time Calculation (min): 50 minutes       Chief Complaint: Back Problem    Visit #: 11    SUBJECTIVE:  Pt states the back brace place called him to set up and appointment to be fitted.     As things get warmed up, it feels a little better. Which he thinks is improved since starting therapy.      OBJECTIVE:  Current objective measures:   Pain with  and pressure to B quadratus          Therapeutic Exercises (CPT 26660):     1. TRX, rows, 10 x 1, push ups, 10 x 1, squats - assisted, 10 x 1, side body stretch, 20\" x 2, side body stretch w/ leg behind, 20\" x 2    15. UPOC: 10/22/19      Therapeutic Exercise Summary: HEP: LTR, SKTC, hamstring supine, ADIM, ADIM + ball squeeze, seated ball rolls    Discussed type of back brace to look for: more like a lifting/compression belt    Therapeutic Treatments and Modalities:     2. E Stim Unattended (CPT 10022), ifc to low back with MHP x 15 min, no charge, applied and removed by aide    Time-based treatments/modalities:  Therapeutic exercise minutes (CPT 22170): 25 minutes         ASSESSMENT:   Response to treatment: Pt presents with bilateral low back pain. Pt able to tolerate core and back exercises with the TRX without increase in pain. Pt started to notice improved symptoms with movement and the exercises.       PLAN/RECOMMENDATIONS:   Plan for treatment: therapy treatment to continue next visit.  Planned interventions for next visit: continue with current treatment. Discuss PN vs d/c.      [facility][id number] neuro re ed ([id number]), and 2 therapeutic exer ([id number]) per session     "

## 2019-09-12 ENCOUNTER — PHYSICAL THERAPY (OUTPATIENT)
Dept: PHYSICAL THERAPY | Facility: MEDICAL CENTER | Age: 64
End: 2019-09-12
Attending: FAMILY MEDICINE
Payer: MEDICAID

## 2019-09-12 DIAGNOSIS — G89.29 CHRONIC LOW BACK PAIN WITHOUT SCIATICA, UNSPECIFIED BACK PAIN LATERALITY: ICD-10-CM

## 2019-09-12 DIAGNOSIS — M79.18 LUMBAR MUSCLE PAIN: ICD-10-CM

## 2019-09-12 DIAGNOSIS — S29.019A THORACIC MYOFASCIAL STRAIN, INITIAL ENCOUNTER: ICD-10-CM

## 2019-09-12 DIAGNOSIS — M54.50 CHRONIC LOW BACK PAIN WITHOUT SCIATICA, UNSPECIFIED BACK PAIN LATERALITY: ICD-10-CM

## 2019-09-12 PROCEDURE — 97110 THERAPEUTIC EXERCISES: CPT

## 2019-09-12 NOTE — OP THERAPY DAILY TREATMENT
"  Outpatient Physical Therapy  DAILY TREATMENT     [facility] Outpatient Physical Therapy  [address]  [place] [address]  Phone:  [phone]  Fax:  [phone]    Date: 09/12/2019    Patient: Allen Mccabe  YOB: 1955  MRN: [id number]     Time Calculation  Start time: 1000  Stop time: 1030 Time Calculation (min): 30 minutes       Chief Complaint: Back Problem    Visit #: 12    SUBJECTIVE:  Pt states he has a HA today, not sure why. He feels good with the exercises he has and that he gets looser throughout the day once he gets moving which is an improvement since starting therapy.      OBJECTIVE:  Current objective measures:   Lx ROM: WNL for all directions. flexion: reaches ankles, no pain. Extension, SB, rotation: some pain but minimal  Lifting: tries not to lift above 10 lbs  MMT: 4+/5 for B hip flexion  Gm Juan Pablo Low Back Pain and Disability Score: 41.67      Short Term Goals:   1. Pt is to be able to perform lx ROM into flexion without increase in back pain. MET  2. Pt is to be able to have 4+/5 into hip flexion bilaterally without increase in pain. MET  3. Pt is to perform HEP without cueing demonstrating compliance. MET  Short term goal time span:  2-4 weeks      Long Term Goals:    1. Pt is to be able to perform lx ROM into all directions without increase in pain. NOT MET  2. Pt is to be able to lift 20lbs without increase in pain. NOT ATTEMPTED  3. Pt is to score a 9 or less on the RMQ demonstrating meaningful improvement. NOT MET  Long term goal time span:  2-4 months      Therapeutic Exercises (CPT 41590):     1. TRX, rows, 10 x 1, push ups, 10 x 1, side body stretch, 20\" x 2, side body stretch w/ leg behind, 20\" x 2    15. UPOC: 10/22/19      Therapeutic Exercise Summary: HEP: LTR, SKTC, hamstring supine, ADIM, ADIM + ball squeeze, seated ball rolls    Discussed importance of continued adherence to HEP      Time-based " treatments/modalities:  Therapeutic exercise minutes (CPT 02985): 25 minutes         ASSESSMENT:   Response to treatment: Pt presents with bilateral low back pain. Pt continues to have stiffness in the morning, but feels that movement and the exercises now help decrease his symptoms. He feels ready to d/c to HEP.      PLAN/RECOMMENDATIONS:   Pt to d/c to HEP      1 [facility] traction ([id number]), neuro re ed ([id number]), and 2 therapeutic exer ([id number]) per session

## 2019-09-12 NOTE — OP THERAPY DISCHARGE SUMMARY
Outpatient Physical Therapy  DISCHARGE SUMMARY NOTE      [facility] Outpatient Physical Therapy  [address]  [place] [address]  Phone:  [phone]  Fax:  [phone]    Date of Visit: 09/12/2019    Patient: Allen Mccabe  YOB: 1955  MRN: [id number]     Referring Provider: Michel Triana M.D.  [address]  [place] [address]   Referring Diagnosis Strain of muscle and tendon of unspecified wall of thorax, initial encounter [S29.019A]     Physical Therapy Occurrence Codes    Date of onset of impairment:  7/19/19   Date physical therapy care plan established or reviewed:  7/30/19   Date physical therapy treatment started:  7/30/19          Functional Assessment Used  Gm Juan Pablo Low Back Pain and Disability Score: 41.67     Your patient is being discharged from Physical Therapy with the following comments:   · Goals partially met    Lx ROM: WNL for all directions. flexion: reaches ankles, no pain. Extension, SB, rotation: some pain but minimal  Lifting: tries not to lift above 10 lbs  MMT: 4+/5 for B hip flexion  Gm Juan Pablo Low Back Pain and Disability Score: 41.67       Short Term Goals:   1. Pt is to be able to perform lx ROM into flexion without increase in back pain. MET  2. Pt is to be able to have 4+/5 into hip flexion bilaterally without increase in pain. MET  3. Pt is to perform HEP without cueing demonstrating compliance. MET  Short term goal time span:  2-4 weeks     Long Term Goals:    1. Pt is to be able to perform lx ROM into all directions without increase in pain. NOT MET  2. Pt is to be able to lift 20lbs without increase in pain. NOT ATTEMPTED  3. Pt is to score a 9 or less on the RMQ demonstrating meaningful improvement. NOT MET  Long term goal time span:  2-4 months    Comments:  Pt presented with bilateral low back pain. Pt has improved lx ROM, decreased pain with lx ROM, and improved stiffness with exercises.        Limitations  Remaining:  Pt continues to have stiffness in the morning, but feels that movement and the exercises now help decrease his symptoms.     Recommendations:  Pt to d/c to MARIANA Ga, PT, DPT    Date: 9/12/2019

## 2019-10-15 ENCOUNTER — OFFICE VISIT (OUTPATIENT)
Dept: MEDICAL GROUP | Facility: MEDICAL CENTER | Age: 64
End: 2019-10-15
Attending: FAMILY MEDICINE
Payer: MEDICAID

## 2019-10-15 VITALS
TEMPERATURE: 97.9 F | BODY MASS INDEX: 35.36 KG/M2 | RESPIRATION RATE: 16 BRPM | SYSTOLIC BLOOD PRESSURE: 120 MMHG | WEIGHT: 220 LBS | HEIGHT: 66 IN | HEART RATE: 76 BPM | DIASTOLIC BLOOD PRESSURE: 64 MMHG | OXYGEN SATURATION: 95 %

## 2019-10-15 DIAGNOSIS — R10.10 PAIN OF UPPER ABDOMEN: ICD-10-CM

## 2019-10-15 PROCEDURE — 99212 OFFICE O/P EST SF 10 MIN: CPT | Performed by: FAMILY MEDICINE

## 2019-10-15 PROCEDURE — 99213 OFFICE O/P EST LOW 20 MIN: CPT | Performed by: FAMILY MEDICINE

## 2019-10-16 NOTE — PROGRESS NOTES
"Subjective:      Allen Mccabe is a 64 y.o. male who presents with Abdominal Pain            HPI 1.  Patient presents acutely to discuss 2 types of abdominal pain.  The first 1 tends to occur first thing in the morning and is mid upper abdominal pain in the epigastric and bilateral upper quadrant areas.  Patient reports that it feels like severe indigestion.  He reports that typically each morning he will pass 2 bowel movements which have remained brown and there have been no bloody or black stools.  Reports that he will also have feeling that he needs to go back to the bathroom and evacuate his bowels further and actually will go sit on the commode but with no further defecation.  Second pain is reported as a sharper pain in his lower left chest upper left abdomen that has been present for a number of months.  Typically in the past it would come and go over just a matter of minutes but recently has been lasting at least 10 minutes and on occasion up to 3 hours.  Neither of these pains are causing nausea, vomiting or change in his stool habits.  He denies any obvious abdominal cramps.  Patient reports that he took respectively a single dose of ranitidine and of Prilosec recently with no short-term improvement    ROS negative for dyspnea, cough, hematuria       Objective:     /64 (BP Location: Left arm, Patient Position: Sitting, BP Cuff Size: Large adult)   Pulse 76   Temp 36.6 °C (97.9 °F) (Temporal)   Resp 16   Ht 1.676 m (5' 5.98\")   Wt 99.8 kg (220 lb)   SpO2 95%   BMI 35.53 kg/m²      Physical Exam  Gen.- alert, cooperative, in no acute distress  Neck- midline trachea, thyroid not enlarged or tender,supple, no cervical adenopathy  Chest-clear to auscultation and percussion with normal breath sounds. No retractions. Chest wall nontender  Cardiac- regular rhythm and rate. No murmur, thrill, or heave  Abdomen-mildly obese contour, 1+ tender to palpation in the epigastric and right upper quadrant " areas without palpable mass or rebound tenderness.  Patient also has chronic suprapubic tenderness (interstitial cystitis).  Normal pitched bowel sounds.          Assessment/Plan:     1. Pain of upper abdomen  Plan: 1.  Discussed with patient that I do not detect any obvious red flag symptoms or findings  2.  Suggest trial of probiotic of his choosing x10 days

## 2019-10-21 RX ORDER — TRIAMTERENE AND HYDROCHLOROTHIAZIDE 37.5; 25 MG/1; MG/1
TABLET ORAL
Qty: 30 TAB | Refills: 6 | Status: SHIPPED | OUTPATIENT
Start: 2019-10-21 | End: 2020-05-11 | Stop reason: SDUPTHER

## 2019-11-22 ENCOUNTER — OFFICE VISIT (OUTPATIENT)
Dept: MEDICAL GROUP | Facility: MEDICAL CENTER | Age: 64
End: 2019-11-22
Attending: FAMILY MEDICINE
Payer: MEDICAID

## 2019-11-22 VITALS
DIASTOLIC BLOOD PRESSURE: 72 MMHG | TEMPERATURE: 97.8 F | BODY MASS INDEX: 35.36 KG/M2 | HEART RATE: 80 BPM | SYSTOLIC BLOOD PRESSURE: 126 MMHG | WEIGHT: 220 LBS | HEIGHT: 66 IN | RESPIRATION RATE: 16 BRPM | OXYGEN SATURATION: 94 %

## 2019-11-22 DIAGNOSIS — I10 ESSENTIAL HYPERTENSION: ICD-10-CM

## 2019-11-22 DIAGNOSIS — N30.10 INTERSTITIAL CYSTITIS: ICD-10-CM

## 2019-11-22 PROCEDURE — 99213 OFFICE O/P EST LOW 20 MIN: CPT | Performed by: FAMILY MEDICINE

## 2019-11-22 RX ORDER — HYDROCODONE BITARTRATE AND ACETAMINOPHEN 10; 325 MG/1; MG/1
TABLET ORAL
Qty: 112 TAB | Refills: 0 | Status: SHIPPED | OUTPATIENT
Start: 2019-11-26 | End: 2019-11-22 | Stop reason: SDUPTHER

## 2019-11-22 RX ORDER — HYDROCODONE BITARTRATE AND ACETAMINOPHEN 10; 325 MG/1; MG/1
TABLET ORAL
Qty: 112 TAB | Refills: 0 | Status: SHIPPED | OUTPATIENT
Start: 2019-12-24 | End: 2019-11-22 | Stop reason: SDUPTHER

## 2019-11-22 RX ORDER — HYDROCODONE BITARTRATE AND ACETAMINOPHEN 10; 325 MG/1; MG/1
TABLET ORAL
Qty: 112 TAB | Refills: 0 | Status: SHIPPED | OUTPATIENT
Start: 2020-01-21 | End: 2020-02-18 | Stop reason: SDUPTHER

## 2019-11-22 NOTE — PROGRESS NOTES
"Subjective:      Allen Mccabe is a 64 y.o. male who presents with No chief complaint on file.            HPI 1.  Essential hypertension-patient reports that he does not get as stressed about things and wonders if he is well-controlled blood pressure may not require 3 separate drugs.  He is taking Terrazosin, Dyazide, and diltiazem.  Not reporting chest pain or chest pressure  2.  Interstitial cystitis-patient is continuing to use the Norco 4 times daily.  Uses rare dose of Dilaudid for more severe breakthrough pain.  Does not recall utilizing the Terrazosin to help with urination.  ROS       Objective:     /72 (BP Location: Right arm, Patient Position: Sitting, BP Cuff Size: Large adult)   Pulse 80   Temp 36.6 °C (97.8 °F) (Temporal)   Resp 16   Ht 1.676 m (5' 5.98\")   Wt 99.8 kg (220 lb)   SpO2 94%   BMI 35.53 kg/m²      Physical Exam  Gen.- alert, cooperative, in no acute distress  Neck- midline trachea, thyroid not enlarged or tender,supple, no cervical adenopathy  Chest-clear to auscultation and percussion with normal breath sounds. No retractions. Chest wall nontender  Cardiac- regular rhythm and rate. No murmur, thrill, or heave  Abdomen-normal contour, mild suprapubic tenderness on palpation.  No palpable mass or rebound          Assessment/Plan:       1. Interstitial cystitis    - HYDROcodone/acetaminophen (NORCO)  MG Tab; TAKE 1 TABLET ORALLY EVERY 6 HOURS IF NEEDED  Dispense: 112 Tab; Refill: 0    2. Essential hypertension    Plan: 1.  Norco refilled today-3 months  2.  Trial of withholding Terrazosin and watch both characteristics of urination and blood pressure off that medication  3.  Revisit within 3 months  "

## 2019-12-05 NOTE — TELEPHONE ENCOUNTER
Was the patient seen in the last year in this department? Yes    Does patient have an active prescription for medications requested? No     Received Request Via: Patient   157.48

## 2019-12-19 ENCOUNTER — TELEPHONE (OUTPATIENT)
Dept: MEDICAL GROUP | Facility: MEDICAL CENTER | Age: 64
End: 2019-12-19

## 2019-12-19 RX ORDER — LIDOCAINE 40 MG/G
1 CREAM TOPICAL
Qty: 60 G | Refills: 4 | Status: SHIPPED
Start: 2019-12-19 | End: 2023-04-07

## 2020-01-19 DIAGNOSIS — L30.9 DERMATITIS: ICD-10-CM

## 2020-01-20 RX ORDER — BETAMETHASONE DIPROPIONATE 0.05 %
OINTMENT (GRAM) TOPICAL
Qty: 45 G | Refills: 8 | Status: SHIPPED | OUTPATIENT
Start: 2020-01-20 | End: 2021-04-19 | Stop reason: SDUPTHER

## 2020-02-18 ENCOUNTER — OFFICE VISIT (OUTPATIENT)
Dept: MEDICAL GROUP | Facility: MEDICAL CENTER | Age: 65
End: 2020-02-18
Attending: FAMILY MEDICINE
Payer: MEDICAID

## 2020-02-18 VITALS
BODY MASS INDEX: 36.48 KG/M2 | WEIGHT: 227 LBS | TEMPERATURE: 97.9 F | OXYGEN SATURATION: 92 % | DIASTOLIC BLOOD PRESSURE: 58 MMHG | SYSTOLIC BLOOD PRESSURE: 104 MMHG | RESPIRATION RATE: 18 BRPM | HEART RATE: 70 BPM | HEIGHT: 66 IN

## 2020-02-18 DIAGNOSIS — N30.10 INTERSTITIAL CYSTITIS: ICD-10-CM

## 2020-02-18 DIAGNOSIS — S29.019D THORACIC MYOFASCIAL STRAIN, SUBSEQUENT ENCOUNTER: ICD-10-CM

## 2020-02-18 PROCEDURE — 99213 OFFICE O/P EST LOW 20 MIN: CPT | Performed by: FAMILY MEDICINE

## 2020-02-18 RX ORDER — HYDROCODONE BITARTRATE AND ACETAMINOPHEN 10; 325 MG/1; MG/1
TABLET ORAL
Qty: 112 TAB | Refills: 0 | Status: SHIPPED | OUTPATIENT
Start: 2020-03-17 | End: 2020-04-14

## 2020-02-18 RX ORDER — HYDROCODONE BITARTRATE AND ACETAMINOPHEN 10; 325 MG/1; MG/1
TABLET ORAL
Qty: 112 TAB | Refills: 0 | Status: SHIPPED | OUTPATIENT
Start: 2020-02-18 | End: 2020-02-18 | Stop reason: SDUPTHER

## 2020-02-18 RX ORDER — HYDROCODONE BITARTRATE AND ACETAMINOPHEN 10; 325 MG/1; MG/1
TABLET ORAL
Qty: 112 TAB | Refills: 0 | Status: SHIPPED | OUTPATIENT
Start: 2020-04-14 | End: 2020-05-11 | Stop reason: SDUPTHER

## 2020-02-18 NOTE — PROGRESS NOTES
"Subjective:      Allen Mccabe is a 64 y.o. male who presents with Pain (management )            HPI 1.  Interstitial cystitis-patient reports mild decrease but still daily suprapubic pain.  He is relying on Norco 10/325 4 times daily with rare use of hydromorphone 4 mg for breakthrough pain.  Not reporting any bloody urine or urinary incontinence.  2.  Thoracic pain-patient notes increased pain in his mid and lower back in the past month.  He has had some weight gain due to relaxed dietary habits over the past 3 months.  Denies current cough or dyspnea.    ROS negative for urinary incontinence, fecal incontinence, recent falls       Objective:     /58 (BP Location: Left arm, Patient Position: Sitting, BP Cuff Size: Large adult)   Pulse 70   Temp 36.6 °C (97.9 °F) (Temporal)   Resp 18   Ht 1.676 m (5' 6\")   Wt 103 kg (227 lb)   SpO2 92%   BMI 36.64 kg/m²      Physical Exam  Gen.- alert, cooperative, in no acute distress  Neck- midline trachea, thyroid not enlarged or tender,supple, no cervical adenopathy  Chest-clear to auscultation and percussion with normal breath sounds. No retractions. Chest wall nontender  Cardiac- regular rhythm and rate. No murmur, thrill, or heave  Back-1+ tender in the para thoracic areas from T6 down to L2 bilaterally          Assessment/Plan:       1. Interstitial cystitis    - HYDROcodone/acetaminophen (NORCO)  MG Tab; TAKE 1 TABLET ORALLY EVERY 6 HOURS IF NEEDED  Dispense: 112 Tab; Refill: 0  - HYDROcodone/acetaminophen (NORCO)  MG Tab; TAKE 1 TABLET ORALLY EVERY 6 HOURS IF NEEDED  Dispense: 112 Tab; Refill: 0    2. Thoracic myofascial strain, subsequent encounter    Plan: 1. Pain management referral  2. Renew Norco x3 months with revisit at that time  "

## 2020-03-16 ENCOUNTER — PATIENT MESSAGE (OUTPATIENT)
Dept: MEDICAL GROUP | Facility: MEDICAL CENTER | Age: 65
End: 2020-03-16

## 2020-03-16 DIAGNOSIS — N30.10 INTERSTITIAL CYSTITIS: ICD-10-CM

## 2020-04-07 ENCOUNTER — PATIENT MESSAGE (OUTPATIENT)
Dept: MEDICAL GROUP | Facility: MEDICAL CENTER | Age: 65
End: 2020-04-07

## 2020-04-09 DIAGNOSIS — R05.9 COUGH: ICD-10-CM

## 2020-04-09 RX ORDER — PROMETHAZINE HYDROCHLORIDE AND CODEINE PHOSPHATE 6.25; 1 MG/5ML; MG/5ML
5 SYRUP ORAL 4 TIMES DAILY PRN
Qty: 480 ML | Refills: 0 | OUTPATIENT
Start: 2020-04-09 | End: 2020-07-08

## 2020-04-20 DIAGNOSIS — R10.2 PELVIC PAIN: ICD-10-CM

## 2020-04-20 RX ORDER — HYDROMORPHONE HYDROCHLORIDE 4 MG/1
4 TABLET ORAL EVERY 6 HOURS PRN
Qty: 45 TAB | Refills: 0 | Status: SHIPPED | OUTPATIENT
Start: 2020-04-20 | End: 2020-06-25 | Stop reason: SDUPTHER

## 2020-05-11 ENCOUNTER — OFFICE VISIT (OUTPATIENT)
Dept: MEDICAL GROUP | Facility: MEDICAL CENTER | Age: 65
End: 2020-05-11
Attending: FAMILY MEDICINE
Payer: MEDICAID

## 2020-05-11 VITALS
DIASTOLIC BLOOD PRESSURE: 82 MMHG | HEART RATE: 80 BPM | SYSTOLIC BLOOD PRESSURE: 140 MMHG | BODY MASS INDEX: 35.22 KG/M2 | HEIGHT: 67 IN | TEMPERATURE: 98 F | WEIGHT: 224.4 LBS

## 2020-05-11 DIAGNOSIS — N30.10 INTERSTITIAL CYSTITIS: ICD-10-CM

## 2020-05-11 DIAGNOSIS — I10 ESSENTIAL HYPERTENSION: ICD-10-CM

## 2020-05-11 DIAGNOSIS — E66.9 OBESITY (BMI 30-39.9): ICD-10-CM

## 2020-05-11 PROCEDURE — 99214 OFFICE O/P EST MOD 30 MIN: CPT | Performed by: FAMILY MEDICINE

## 2020-05-11 PROCEDURE — 99213 OFFICE O/P EST LOW 20 MIN: CPT | Performed by: FAMILY MEDICINE

## 2020-05-11 RX ORDER — TRIAMTERENE AND HYDROCHLOROTHIAZIDE 37.5; 25 MG/1; MG/1
TABLET ORAL
Qty: 30 TAB | Refills: 6 | Status: SHIPPED | OUTPATIENT
Start: 2020-05-11 | End: 2020-12-19 | Stop reason: SDUPTHER

## 2020-05-11 RX ORDER — HYDROCODONE BITARTRATE AND ACETAMINOPHEN 10; 325 MG/1; MG/1
TABLET ORAL
Qty: 112 TAB | Refills: 0 | Status: SHIPPED | OUTPATIENT
Start: 2020-07-06 | End: 2020-07-02

## 2020-05-11 RX ORDER — HYDROCODONE BITARTRATE AND ACETAMINOPHEN 10; 325 MG/1; MG/1
TABLET ORAL
Qty: 112 TAB | Refills: 0 | Status: SHIPPED | OUTPATIENT
Start: 2020-05-11 | End: 2020-06-08

## 2020-05-11 RX ORDER — TERAZOSIN 5 MG/1
CAPSULE ORAL
Qty: 30 CAP | Refills: 6 | Status: SHIPPED | OUTPATIENT
Start: 2020-01-16 | End: 2020-12-19 | Stop reason: SDUPTHER

## 2020-05-11 RX ORDER — HYDROCODONE BITARTRATE AND ACETAMINOPHEN 10; 325 MG/1; MG/1
TABLET ORAL
Qty: 112 TAB | Refills: 0 | Status: SHIPPED | OUTPATIENT
Start: 2020-06-08 | End: 2020-07-02 | Stop reason: SDUPTHER

## 2020-05-11 RX ORDER — DILTIAZEM HYDROCHLORIDE 300 MG/1
CAPSULE, COATED, EXTENDED RELEASE ORAL
Qty: 30 CAP | Refills: 6 | Status: SHIPPED | OUTPATIENT
Start: 2020-01-21 | End: 2020-11-19 | Stop reason: SDUPTHER

## 2020-05-11 NOTE — PROGRESS NOTES
"Subjective:      Allen Mccabe is a 65 y.o. male who presents with Medication Refill            HPI 1.  Essential hypertension-patient reports he has been compliant taking his diltiazem 300 mg daily, 5 mg of Hytrin daily, and Dyazide 37.5/25.  He reports home blood pressures are typically running in the 1 15-1 20 systolic range.  Not reporting any chest pain or dyspnea.  2.  Interstitial cystitis-patient reports no changes.  Has daily pelvic pain.  No gross hematuria and no fever.  3.  Chronic thoracolumbar pain-patient has met with Dr. Ku, pain management.  He is considering a nerve root ablation.  Currently is taking Norco 10/325 4 times daily with an occasional dose of hydromorphone 4 mg for severe breakthrough pain.  Not reporting any constipation or confusion.    ROS negative for urinary incontinence, fecal incontinence, unexplained hair loss       Objective:     /82 (BP Location: Left arm, Patient Position: Sitting, BP Cuff Size: Adult)   Pulse 80   Temp 36.7 °C (98 °F) (Temporal)   Ht 1.702 m (5' 7\")   Wt 101.8 kg (224 lb 6.4 oz)   BMI 35.15 kg/m²      Physical Exam  Gen.- alert, cooperative, in no acute distress  Neck- midline trachea, thyroid not enlarged or tender,supple, no cervical adenopathy  Chest-clear to auscultation and percussion with normal breath sounds. No retractions. Chest wall nontender  Cardiac- regular rhythm and rate. No murmur, thrill, or heave  Abdomen-obese contour, soft, 1+ suprapubic tenderness.  No palpable masses   Lower extremities-negative for pretibial edema, redness, tenderness.  Light touch is preserved       Assessment/Plan:       1. Essential hypertension    - DILTIAZem CD (CARDIZEM CD) 300 MG CAPSULE SR 24 HR; TAKE 1 CAPSULE ORALLY ONCE DAILY  Dispense: 30 Cap; Refill: 6  - terazosin (HYTRIN) 5 MG Cap; TAKE 1 CAPSULE ORALLY ONCE DAILY  Dispense: 30 Cap; Refill: 6  - triamterene-hctz (MAXZIDE-25/DYAZIDE) 37.5-25 MG Tab; TAKE 1 TABLET ORALLY ONCE DAILY  " Dispense: 30 Tab; Refill: 6    2. Interstitial cystitis    - HYDROcodone/acetaminophen (NORCO)  MG Tab; TAKE 1 TABLET ORALLY EVERY 6 HOURS IF NEEDED  Dispense: 112 Tab; Refill: 0  - HYDROcodone/acetaminophen (NORCO)  MG Tab; TAKE 1 TABLET ORALLY EVERY 6 HOURS IF NEEDED  Dispense: 112 Tab; Refill: 0  - HYDROcodone/acetaminophen (NORCO)  MG Tab; TAKE 1 TABLET ORALLY EVERY 6 HOURS IF NEEDED  Dispense: 112 Tab; Refill: 0    3. Obesity (BMI 30-39.9)    Plan: 1.  Reviewed procedure and expected benefits associated with spinal nerve root ablation  2.  Patient is encouraged to continue to limit portions and achieve further gradual weight loss (current weight down 4 pounds)  3.  Renew Norco x3 months  4.  Revisit in 3 months  5.  Patient encouraged to continue monitoring home blood pressures

## 2020-07-01 ENCOUNTER — PATIENT MESSAGE (OUTPATIENT)
Dept: MEDICAL GROUP | Facility: MEDICAL CENTER | Age: 65
End: 2020-07-01

## 2020-07-01 DIAGNOSIS — N30.10 INTERSTITIAL CYSTITIS: ICD-10-CM

## 2020-07-02 RX ORDER — HYDROCODONE BITARTRATE AND ACETAMINOPHEN 10; 325 MG/1; MG/1
TABLET ORAL
Qty: 112 TAB | Refills: 0 | Status: SHIPPED | OUTPATIENT
Start: 2020-07-03 | End: 2020-08-20 | Stop reason: SDUPTHER

## 2020-07-30 ENCOUNTER — OFFICE VISIT (OUTPATIENT)
Dept: MEDICAL GROUP | Facility: MEDICAL CENTER | Age: 65
End: 2020-07-30
Attending: FAMILY MEDICINE
Payer: MEDICAID

## 2020-07-30 VITALS
TEMPERATURE: 98.3 F | DIASTOLIC BLOOD PRESSURE: 62 MMHG | HEIGHT: 67 IN | WEIGHT: 222.3 LBS | RESPIRATION RATE: 16 BRPM | OXYGEN SATURATION: 96 % | HEART RATE: 64 BPM | BODY MASS INDEX: 34.89 KG/M2 | SYSTOLIC BLOOD PRESSURE: 120 MMHG

## 2020-07-30 DIAGNOSIS — K21.9 GASTROESOPHAGEAL REFLUX DISEASE WITHOUT ESOPHAGITIS: ICD-10-CM

## 2020-07-30 PROCEDURE — 99213 OFFICE O/P EST LOW 20 MIN: CPT | Performed by: FAMILY MEDICINE

## 2020-07-30 RX ORDER — OMEPRAZOLE 20 MG/1
20 CAPSULE, DELAYED RELEASE ORAL DAILY
Qty: 30 CAP | Refills: 11 | Status: SHIPPED | OUTPATIENT
Start: 2020-07-30 | End: 2021-08-03 | Stop reason: SDUPTHER

## 2020-07-31 NOTE — PROGRESS NOTES
"Subjective:      Allen Mccabe is a 65 y.o. male who presents with Abdominal Pain            HPI 1.  Abdominal pain-patient reports over the past 2 weeks he has had recurrent epigastric ache and epigastric burning particularly after eats his solitary banana first thing in the morning.  There is been no associated nausea or vomiting.  Patient did have an old bottle of ranitidine and found that was helpful.    ROS negative for constipation, diarrhea, black stools, bloody stools       Objective:     /62 (BP Location: Left arm, Patient Position: Sitting, BP Cuff Size: Adult)   Pulse 64   Temp 36.8 °C (98.3 °F) (Temporal)   Resp 16   Ht 1.702 m (5' 7.01\")   Wt 100.8 kg (222 lb 4.8 oz)   SpO2 96%   BMI 34.81 kg/m²      Physical Exam  Gen.- alert, cooperative, in no acute distress  Neck- midline trachea, thyroid not enlarged or tender,supple, no cervical adenopathy  Chest-clear to auscultation and percussion with normal breath sounds. No retractions. Chest wall nontender  Cardiac- regular rhythm and rate. No murmur, thrill, or heave  Abdomen-1+ tender in the epigastric area and in the midline suprapubic area (chronic).  No palpable masses or rebound tenderness          Assessment/Plan:       1. Gastroesophageal reflux disease without esophagitis    - omeprazole (PRILOSEC) 20 MG delayed-release capsule; Take 1 Cap by mouth every day.  Dispense: 30 Cap; Refill: 11  Plan: 1.  Prilosec 20 mg daily  2.  Discussed limiting portions, remaining upright for 2 hours after any meal, limiting tomato content in diet.  "

## 2020-08-08 ENCOUNTER — PATIENT MESSAGE (OUTPATIENT)
Dept: MEDICAL GROUP | Facility: MEDICAL CENTER | Age: 65
End: 2020-08-08

## 2020-08-17 PROCEDURE — RXMED WILLOW AMBULATORY MEDICATION CHARGE: Performed by: FAMILY MEDICINE

## 2020-08-20 ENCOUNTER — PHARMACY VISIT (OUTPATIENT)
Dept: PHARMACY | Facility: MEDICAL CENTER | Age: 65
End: 2020-08-20
Payer: COMMERCIAL

## 2020-08-20 ENCOUNTER — OFFICE VISIT (OUTPATIENT)
Dept: MEDICAL GROUP | Facility: MEDICAL CENTER | Age: 65
End: 2020-08-20
Attending: FAMILY MEDICINE
Payer: MEDICAID

## 2020-08-20 VITALS
TEMPERATURE: 97.7 F | RESPIRATION RATE: 14 BRPM | DIASTOLIC BLOOD PRESSURE: 68 MMHG | BODY MASS INDEX: 34.49 KG/M2 | OXYGEN SATURATION: 95 % | SYSTOLIC BLOOD PRESSURE: 114 MMHG | HEIGHT: 66 IN | WEIGHT: 214.6 LBS | HEART RATE: 78 BPM

## 2020-08-20 DIAGNOSIS — N30.10 INTERSTITIAL CYSTITIS: ICD-10-CM

## 2020-08-20 DIAGNOSIS — R10.2 PELVIC PAIN: ICD-10-CM

## 2020-08-20 PROCEDURE — 99214 OFFICE O/P EST MOD 30 MIN: CPT | Performed by: FAMILY MEDICINE

## 2020-08-20 PROCEDURE — 99213 OFFICE O/P EST LOW 20 MIN: CPT | Performed by: FAMILY MEDICINE

## 2020-08-20 RX ORDER — HYDROCODONE BITARTRATE AND ACETAMINOPHEN 10; 325 MG/1; MG/1
1 TABLET ORAL EVERY 6 HOURS PRN
Qty: 60 TAB | Refills: 0 | Status: SHIPPED | OUTPATIENT
Start: 2020-08-20 | End: 2020-08-31 | Stop reason: SDUPTHER

## 2020-08-20 RX ORDER — HYDROCODONE BITARTRATE AND ACETAMINOPHEN 10; 325 MG/1; MG/1
1 TABLET ORAL EVERY 6 HOURS PRN
Qty: 60 TAB | Refills: 0 | Status: SHIPPED | OUTPATIENT
Start: 2020-08-20 | End: 2020-08-20 | Stop reason: SDUPTHER

## 2020-08-21 ENCOUNTER — PHARMACY VISIT (OUTPATIENT)
Dept: PHARMACY | Facility: MEDICAL CENTER | Age: 65
End: 2020-08-21
Payer: COMMERCIAL

## 2020-08-21 PROCEDURE — RXMED WILLOW AMBULATORY MEDICATION CHARGE: Performed by: FAMILY MEDICINE

## 2020-08-21 NOTE — PATIENT INSTRUCTIONS
Patient was advised to take all meds as directed , and to follow up regularly , to bring all meds each time she is coming to see me, medication SE discussed  . RTC as needed   ER warnings reviewed

## 2020-08-25 PROCEDURE — RXMED WILLOW AMBULATORY MEDICATION CHARGE: Performed by: OPTOMETRIST

## 2020-08-25 PROCEDURE — RXMED WILLOW AMBULATORY MEDICATION CHARGE: Performed by: FAMILY MEDICINE

## 2020-08-26 ENCOUNTER — TELEPHONE (OUTPATIENT)
Dept: MEDICAL GROUP | Facility: MEDICAL CENTER | Age: 65
End: 2020-08-26

## 2020-08-27 ENCOUNTER — PHARMACY VISIT (OUTPATIENT)
Dept: PHARMACY | Facility: MEDICAL CENTER | Age: 65
End: 2020-08-27
Payer: COMMERCIAL

## 2020-08-31 ENCOUNTER — PATIENT MESSAGE (OUTPATIENT)
Dept: MEDICAL GROUP | Facility: MEDICAL CENTER | Age: 65
End: 2020-08-31

## 2020-08-31 DIAGNOSIS — N30.10 INTERSTITIAL CYSTITIS: ICD-10-CM

## 2020-08-31 RX ORDER — HYDROCODONE BITARTRATE AND ACETAMINOPHEN 10; 325 MG/1; MG/1
1 TABLET ORAL EVERY 6 HOURS PRN
Qty: 60 TAB | Refills: 0 | Status: CANCELLED
Start: 2020-08-31 | End: 2020-09-15

## 2020-08-31 RX ORDER — HYDROCODONE BITARTRATE AND ACETAMINOPHEN 10; 325 MG/1; MG/1
1 TABLET ORAL EVERY 6 HOURS PRN
Qty: 112 TAB | Refills: 0 | Status: SHIPPED | OUTPATIENT
Start: 2020-08-31 | End: 2020-08-31 | Stop reason: SDUPTHER

## 2020-08-31 RX ORDER — HYDROCODONE BITARTRATE AND ACETAMINOPHEN 10; 325 MG/1; MG/1
1 TABLET ORAL EVERY 6 HOURS PRN
Qty: 112 TAB | Refills: 0 | Status: SHIPPED | OUTPATIENT
Start: 2020-09-04 | End: 2020-09-29 | Stop reason: SDUPTHER

## 2020-09-01 ENCOUNTER — PHARMACY VISIT (OUTPATIENT)
Dept: PHARMACY | Facility: MEDICAL CENTER | Age: 65
End: 2020-09-01
Payer: COMMERCIAL

## 2020-09-01 PROCEDURE — RXMED WILLOW AMBULATORY MEDICATION CHARGE: Performed by: FAMILY MEDICINE

## 2020-09-01 RX ORDER — HYDROMORPHONE HYDROCHLORIDE 4 MG/1
4 TABLET ORAL EVERY 6 HOURS PRN
Qty: 45 TAB | Refills: 0 | Status: SHIPPED | OUTPATIENT
Start: 2020-09-01 | End: 2020-10-18 | Stop reason: SDUPTHER

## 2020-09-01 NOTE — TELEPHONE ENCOUNTER
From: Allen Mccabe  To: Michel Triana M.D.  Sent: 8/31/2020 5:47 PM PDT  Subject: Prescription Question    I am relieved to hear you are doing better. I am waiting with anticipation for our next appt.     One thing though, the 5th is on Saturday and Monday the 7th is Memorial day. Could you write the prescription to be filled on Thursday(3) or Friday(4)?     Allen      ----- Message -----   From:Michel Triana M.D.   Sent:8/31/2020 5:40 PM PDT   To:Allen Mccabe   Subject:RE: Prescription Question    Allen, somebody gave me a real mild version of COVID-19. I was able to return back to work on 27 August. Have written you a new 1 month prescription which would be due around 9/5, should be available at the . That should give us time to reschedule a in person appointment. Dr. Robison      ----- Message -----   From:Allen Mccabe   Sent:8/31/2020 10:36 AM PDT   To:Michel Triana M.D.   Subject:Prescription Question    Doc;  I hope this email finds you doing well. I have been concerned for you when I heard you were out for the last 2 weeks.  My 3 month Hydrocodone medication appt was sched for 8/20. In your abscence Dr. Ch saw me and gave me a Hydrocodone RX for 14 days or 9/3. The soonest I was able to sched a f/u appt with you is for September 21. In your abscence a f/u appt is scheduled with Dr. Ch for 9/3, however if your are back in the office I am hoping you will write me the new RX so we can get back on the 28 day cycle.     Hope you are feeling better.    allen

## 2020-09-04 ENCOUNTER — PHARMACY VISIT (OUTPATIENT)
Dept: PHARMACY | Facility: MEDICAL CENTER | Age: 65
End: 2020-09-04
Payer: COMMERCIAL

## 2020-09-04 PROCEDURE — RXMED WILLOW AMBULATORY MEDICATION CHARGE: Performed by: FAMILY MEDICINE

## 2020-09-16 PROCEDURE — RXMED WILLOW AMBULATORY MEDICATION CHARGE: Performed by: FAMILY MEDICINE

## 2020-09-21 ENCOUNTER — PHARMACY VISIT (OUTPATIENT)
Dept: PHARMACY | Facility: MEDICAL CENTER | Age: 65
End: 2020-09-21
Payer: COMMERCIAL

## 2020-09-21 PROCEDURE — RXMED WILLOW AMBULATORY MEDICATION CHARGE: Performed by: OPTOMETRIST

## 2020-09-21 PROCEDURE — RXMED WILLOW AMBULATORY MEDICATION CHARGE: Performed by: FAMILY MEDICINE

## 2020-09-29 ENCOUNTER — PHARMACY VISIT (OUTPATIENT)
Dept: PHARMACY | Facility: MEDICAL CENTER | Age: 65
End: 2020-09-29
Payer: COMMERCIAL

## 2020-09-29 ENCOUNTER — OFFICE VISIT (OUTPATIENT)
Dept: MEDICAL GROUP | Facility: MEDICAL CENTER | Age: 65
End: 2020-09-29
Attending: FAMILY MEDICINE
Payer: MEDICAID

## 2020-09-29 VITALS
BODY MASS INDEX: 35.03 KG/M2 | TEMPERATURE: 98.4 F | HEIGHT: 66 IN | HEART RATE: 68 BPM | RESPIRATION RATE: 16 BRPM | OXYGEN SATURATION: 96 % | DIASTOLIC BLOOD PRESSURE: 62 MMHG | WEIGHT: 218 LBS | SYSTOLIC BLOOD PRESSURE: 120 MMHG

## 2020-09-29 DIAGNOSIS — N30.10 INTERSTITIAL CYSTITIS: ICD-10-CM

## 2020-09-29 DIAGNOSIS — R22.9 SKIN NODULE: ICD-10-CM

## 2020-09-29 DIAGNOSIS — M54.2 NECK PAIN OF OVER 3 MONTHS DURATION: ICD-10-CM

## 2020-09-29 PROCEDURE — 99213 OFFICE O/P EST LOW 20 MIN: CPT | Performed by: FAMILY MEDICINE

## 2020-09-29 PROCEDURE — RXMED WILLOW AMBULATORY MEDICATION CHARGE: Performed by: FAMILY MEDICINE

## 2020-09-29 RX ORDER — HYDROCODONE BITARTRATE AND ACETAMINOPHEN 10; 325 MG/1; MG/1
1 TABLET ORAL EVERY 6 HOURS PRN
Qty: 112 TAB | Refills: 0 | Status: SHIPPED | OUTPATIENT
Start: 2020-10-02 | End: 2020-09-29 | Stop reason: SDUPTHER

## 2020-09-29 RX ORDER — HYDROCODONE BITARTRATE AND ACETAMINOPHEN 10; 325 MG/1; MG/1
1 TABLET ORAL EVERY 6 HOURS PRN
Qty: 112 TAB | Refills: 0 | OUTPATIENT
Start: 2020-09-29 | End: 2020-12-22 | Stop reason: SDUPTHER

## 2020-09-29 RX ORDER — HYDROCODONE BITARTRATE AND ACETAMINOPHEN 10; 325 MG/1; MG/1
1 TABLET ORAL EVERY 6 HOURS PRN
Qty: 112 TAB | Refills: 0 | Status: SHIPPED | OUTPATIENT
Start: 2020-10-30 | End: 2020-11-30

## 2020-09-29 RX ORDER — HYDROCODONE BITARTRATE AND ACETAMINOPHEN 10; 325 MG/1; MG/1
1 TABLET ORAL EVERY 6 HOURS PRN
Qty: 112 TAB | Refills: 0 | Status: SHIPPED | OUTPATIENT
Start: 2020-11-27 | End: 2020-11-24

## 2020-09-29 RX ORDER — TIZANIDINE 4 MG/1
4 TABLET ORAL EVERY 6 HOURS PRN
Qty: 60 TAB | Refills: 3 | Status: SHIPPED | OUTPATIENT
Start: 2020-09-29 | End: 2023-12-20 | Stop reason: SDUPTHER

## 2020-09-29 NOTE — PROGRESS NOTES
"Subjective:      Allen Mccabe is a 65 y.o. male who presents with Cystitis (interstitial cystitis)            HPI 1.  Chronic pain/interstitial cystitis-patient returns for a quarterly pain management visit.  He is typically taking about 4 Norco 10/325 tablets/day along with as needed use of hydromorphone 4 mg for breakthrough pain.  Pain generators are typically his bladder, and occasionally his low back, and headaches.  2.  Skin nodule-patient reports a persistent whitish nodule on the distal medial right thigh dating back 1 year.  He reports that he probably does irritate it from time to time when it becomes pruritic.  He reports also however that it has been recently nonhealing.  ROS negative for gross hematuria, urinary incontinence, recent fever       Objective:     /62 (BP Location: Left arm, Patient Position: Sitting, BP Cuff Size: Adult)   Pulse 68   Temp 36.9 °C (98.4 °F) (Temporal)   Resp 16   Ht 1.676 m (5' 5.98\")   Wt 98.9 kg (218 lb)   SpO2 96%   BMI 35.20 kg/m²      Physical Exam  Gen.- alert, cooperative, in no acute distress  Neck- midline trachea, thyroid not enlarged or tender,supple, no cervical adenopathy  Chest-clear to auscultation and percussion with normal breath sounds. No retractions. Chest wall nontender  Cardiac- regular rhythm and rate. No murmur, thrill, or heave  Abdomen-normal contour.  1+ tender over the suprapubic area and the right lateral abdomen.  No palpable masses or rebound tenderness  Skin-approximately 9 mm raised round white tissue papule with a few small 2 mm areas of dried blood crust on the medial distal right thigh          Assessment/Plan:        1. Interstitial cystitis    - HYDROcodone/acetaminophen (NORCO)  MG Tab; Take 1 Tab by mouth every 6 hours as needed for up to 28 days.  Dispense: 112 Tab; Refill: 0  - HYDROcodone/acetaminophen (NORCO)  MG Tab; Take 1 Tab by mouth every 6 hours as needed for up to 28 days.  Dispense: 112 Tab; " Refill: 0    2. Skin nodule    Plan: 1.  Norco 10/325, #112 refilled for 3 months  2.  Patient may use mild topical steroid such as Cortaid if the papule on his right thigh itches.  Strongly encouraged to avoid scratching and excoriating it.  If it is persistent particularly with scabbing in an additional month we will proceed with excisional biopsy  3.  Revisit within 3 months or sooner if needed

## 2020-10-02 ENCOUNTER — PHARMACY VISIT (OUTPATIENT)
Dept: PHARMACY | Facility: MEDICAL CENTER | Age: 65
End: 2020-10-02
Payer: COMMERCIAL

## 2020-10-02 PROCEDURE — RXMED WILLOW AMBULATORY MEDICATION CHARGE: Performed by: FAMILY MEDICINE

## 2020-10-12 ENCOUNTER — NURSE TRIAGE (OUTPATIENT)
Dept: HEALTH INFORMATION MANAGEMENT | Facility: OTHER | Age: 65
End: 2020-10-12

## 2020-10-12 NOTE — TELEPHONE ENCOUNTER
1. Caller Name:Allen Amilcar Sorenson            Call Back Number:  [facility] PCP or Specialty Provider: Yes         2.  In the last two weeks, has the patient had any new or worsening symptoms (not explained by alternative diagnosis)? Yes, the patient reports the following COVID-19 consistent symptoms: headache, for 3 weeks. No other symptoms.     3.  Does patient have any comoribidities? None     4.  Has the patient traveled in the last 14 days OR had any known contact with someone who is suspected or confirmed to have COVID-19?  No.    5. Disposition: Cleared by RN Triage as potential is low for COVID-19; OK to keep/schedule appointment    Note routed to [facility] Provider: [facility] only.

## 2020-10-12 NOTE — TELEPHONE ENCOUNTER
Regarding: headache   ----- Message from Dalila Robison sent at 10/12/2020  9:16 AM PDT -----  Patient called  To make appointment with primary and is experiencing headache symptoms

## 2020-10-13 ENCOUNTER — OFFICE VISIT (OUTPATIENT)
Dept: MEDICAL GROUP | Facility: MEDICAL CENTER | Age: 65
End: 2020-10-13
Attending: FAMILY MEDICINE
Payer: MEDICAID

## 2020-10-13 VITALS
DIASTOLIC BLOOD PRESSURE: 76 MMHG | SYSTOLIC BLOOD PRESSURE: 148 MMHG | WEIGHT: 215.3 LBS | HEART RATE: 64 BPM | BODY MASS INDEX: 34.6 KG/M2 | OXYGEN SATURATION: 94 % | TEMPERATURE: 98.6 F | RESPIRATION RATE: 16 BRPM | HEIGHT: 66 IN

## 2020-10-13 DIAGNOSIS — R51.9 FRONTAL HEADACHE: ICD-10-CM

## 2020-10-13 PROCEDURE — 99212 OFFICE O/P EST SF 10 MIN: CPT | Performed by: FAMILY MEDICINE

## 2020-10-13 PROCEDURE — 99213 OFFICE O/P EST LOW 20 MIN: CPT | Performed by: FAMILY MEDICINE

## 2020-10-14 NOTE — PROGRESS NOTES
"Subjective:      Allen Mccabe is a 65 y.o. male who presents with Follow-Up            HPI 1.  Acute bifrontal headache-patient ports onset about 3 weeks ago of a bifrontal headache pain.  Most of the time it is fairly mild rated 2-3 out of 10.  However 5-10 times in the past 3 weeks it has briefly spiked up to a level of 6 out of 10.  During the spikes patient has taken an occasional additional dose of Norco or Dilaudid above what he normally takes for his back and interstitial cystitis pains.  There is been no associated nausea.  Patient is concerned because his sister  of apparently a posterior fossa glioma several years ago    ROS negative for loss of balance, tinnitus, change in vision, focal numbness       Objective:     /76   Pulse 64   Temp 37 °C (98.6 °F) (Temporal)   Resp 16   Ht 1.676 m (5' 5.98\")   Wt 97.7 kg (215 lb 4.8 oz)   SpO2 94%   BMI 34.77 kg/m²      Physical Exam  Gen.- alert, cooperative, in no acute distress  Neck- midline trachea, thyroid not enlarged or tender,supple, no cervical adenopathy  Chest-clear to auscultation and percussion with normal breath sounds. No retractions. Chest wall nontender  Cardiac- regular rhythm and rate. No murmur, thrill, or heave  Psych-normal affect with good eye contact. Normal grooming. Oriented x4.  Neuro- intact light touch. Intact strength bilaterally. Normal gait. No tremor. Normal speech           Assessment/Plan:        1. Frontal headache-nonfocal exam today    Plan: 1.  Suggest continued observation with limited use of his pain medication  2.  If headache not improving an additional week which should be a total of 4 weeks overall duration we will proceed with brain scan  "

## 2020-10-18 ENCOUNTER — PATIENT MESSAGE (OUTPATIENT)
Dept: MEDICAL GROUP | Facility: MEDICAL CENTER | Age: 65
End: 2020-10-18

## 2020-10-18 DIAGNOSIS — R10.2 MALE PELVIC PAIN: ICD-10-CM

## 2020-10-18 DIAGNOSIS — R51.9 ACUTE INTRACTABLE HEADACHE, UNSPECIFIED HEADACHE TYPE: ICD-10-CM

## 2020-10-19 RX ORDER — HYDROMORPHONE HYDROCHLORIDE 4 MG/1
4 TABLET ORAL EVERY 6 HOURS PRN
Qty: 45 TAB | Refills: 0 | Status: SHIPPED | OUTPATIENT
Start: 2020-10-19 | End: 2020-12-22 | Stop reason: SDUPTHER

## 2020-10-19 NOTE — TELEPHONE ENCOUNTER
Received request via: Pharmacy    Was the patient seen in the last year in this department? Yes    Does the patient have an active prescription (recently filled or refills available) for medication(s) requested? No   Valtrex Pregnancy And Lactation Text: this medication is Pregnancy Category B and is considered safe during pregnancy. This medication is not directly found in breast milk but it's metabolite acyclovir is present.

## 2020-10-20 ENCOUNTER — PHARMACY VISIT (OUTPATIENT)
Dept: PHARMACY | Facility: MEDICAL CENTER | Age: 65
End: 2020-10-20
Payer: COMMERCIAL

## 2020-10-20 PROCEDURE — RXMED WILLOW AMBULATORY MEDICATION CHARGE: Performed by: FAMILY MEDICINE

## 2020-10-21 PROCEDURE — RXMED WILLOW AMBULATORY MEDICATION CHARGE: Performed by: OPTOMETRIST

## 2020-10-21 PROCEDURE — RXMED WILLOW AMBULATORY MEDICATION CHARGE: Performed by: FAMILY MEDICINE

## 2020-10-22 ENCOUNTER — PHARMACY VISIT (OUTPATIENT)
Dept: PHARMACY | Facility: MEDICAL CENTER | Age: 65
End: 2020-10-22
Payer: COMMERCIAL

## 2020-11-02 ENCOUNTER — PHARMACY VISIT (OUTPATIENT)
Dept: PHARMACY | Facility: MEDICAL CENTER | Age: 65
End: 2020-11-02
Payer: COMMERCIAL

## 2020-11-02 PROCEDURE — RXMED WILLOW AMBULATORY MEDICATION CHARGE: Performed by: FAMILY MEDICINE

## 2020-11-03 ENCOUNTER — TELEPHONE (OUTPATIENT)
Dept: MEDICAL GROUP | Facility: MEDICAL CENTER | Age: 65
End: 2020-11-03

## 2020-11-03 DIAGNOSIS — Z79.899 HIGH RISK MEDICATION USE: ICD-10-CM

## 2020-11-03 NOTE — TELEPHONE ENCOUNTER
Please let patient know he has a lab order to check kidney function in order to receive his brain scan.

## 2020-11-08 ENCOUNTER — PATIENT MESSAGE (OUTPATIENT)
Dept: MEDICAL GROUP | Facility: MEDICAL CENTER | Age: 65
End: 2020-11-08

## 2020-11-08 DIAGNOSIS — R51.9 ACUTE INTRACTABLE HEADACHE, UNSPECIFIED HEADACHE TYPE: ICD-10-CM

## 2020-11-09 ENCOUNTER — PATIENT MESSAGE (OUTPATIENT)
Dept: MEDICAL GROUP | Facility: MEDICAL CENTER | Age: 65
End: 2020-11-09

## 2020-11-10 ENCOUNTER — PATIENT MESSAGE (OUTPATIENT)
Dept: MEDICAL GROUP | Facility: MEDICAL CENTER | Age: 65
End: 2020-11-10

## 2020-11-10 NOTE — TELEPHONE ENCOUNTER
From: Lary Mccabe  To: SOLOMON Hirsch  Sent: 11/9/2020 4:26 PM PST  Subject: Non-Urgent Medical Question    Thanks for follow up. Where do I go, who do I see, and when can I go?      ----- Message -----   From:SOLOMON Hirsch   Sent:11/9/2020 11:35 AM PST   To:Lary Mccabe   Subject:RE: Non-Urgent Medical Question    Lary,     They want to check the kidney function prior to the MRI. I have ordered the BMP, it is not fasting. The results will need to be in before you get the MRI.     Hope this helps,     Deloris García [facility]  Family Medicine   Covering for Michel Triana M.D.       ----- Message -----   From:Lary Mccabe   Sent:11/8/2020 9:15 AM PST   To:Michel Triana M.D.   Subject:Non-Urgent Medical Question    Doc; When making mri appt. I was informed I would need a blood test prior. She said she messaged to get rx and I should contact you. Would like to get it done this week. Please advise.    lary

## 2020-11-11 NOTE — TELEPHONE ENCOUNTER
From: Lary Mccabe  To: SOLOMON Hirsch  Sent: 11/10/2020 10:48 AM PST  Subject: Non-Urgent Medical Question    What lab? Can you help me out here?       ----- Message -----   From:SOLOMON Hirsch   Sent:11/10/2020 8:44 AM PST   To:Lary Mccabe   Subject:RE: Non-Urgent Medical Question    Ilan Villa,     You can call the lab and schedule an appointment. The test needs to be within 30 days of the MRI.     Take care,     ARABELLA Crespo  Family [facility]   Covering for Michel Triana M.D.       ----- Message -----   From:Lary Mccabe   Sent:11/9/2020 4:26 PM PST   To:SOLOMON Hirsch   Subject:Non-Urgent Medical Question    Thanks for follow up. Where do I go, who do I see, and when can I go?      ----- Message -----   From:SOLOMON Hirsch   Sent:11/9/2020 11:35 AM PST   To:Lary Mccabe   Subject:RE: Non-Urgent Medical Question    Lary,     They want to check the kidney function prior to the MRI. I have ordered the BMP, it is not fasting. The results will need to be in before you get the MRI.     Hope this helps,     ARABELLA Crespo  [facility]   Covering for Michel Triana M.D.       ----- Message -----   From:Lary Mccabe   Sent:11/8/2020 9:15 AM PST   To:Michel Triana M.D.   Subject:Non-Urgent Medical Question    Doc; When making mri appt. I was informed I would need a blood test prior. She said she messaged to get rx and I should contact you. Would like to get it done this week. Please advise.    lary

## 2020-11-17 ENCOUNTER — PATIENT MESSAGE (OUTPATIENT)
Dept: MEDICAL GROUP | Facility: MEDICAL CENTER | Age: 65
End: 2020-11-17

## 2020-11-17 DIAGNOSIS — R51.9 ACUTE INTRACTABLE HEADACHE, UNSPECIFIED HEADACHE TYPE: ICD-10-CM

## 2020-11-17 RX ORDER — BUTALBITAL, ACETAMINOPHEN AND CAFFEINE 50; 325; 40 MG/1; MG/1; MG/1
1 TABLET ORAL EVERY 6 HOURS PRN
Qty: 60 TAB | Refills: 0 | Status: SHIPPED | OUTPATIENT
Start: 2020-11-17 | End: 2020-12-18

## 2020-11-18 ENCOUNTER — PHARMACY VISIT (OUTPATIENT)
Dept: PHARMACY | Facility: MEDICAL CENTER | Age: 65
End: 2020-11-18
Payer: COMMERCIAL

## 2020-11-18 PROCEDURE — RXMED WILLOW AMBULATORY MEDICATION CHARGE: Performed by: FAMILY MEDICINE

## 2020-11-19 DIAGNOSIS — I10 ESSENTIAL HYPERTENSION: ICD-10-CM

## 2020-11-19 PROCEDURE — RXMED WILLOW AMBULATORY MEDICATION CHARGE: Performed by: OPTOMETRIST

## 2020-11-19 PROCEDURE — RXMED WILLOW AMBULATORY MEDICATION CHARGE: Performed by: FAMILY MEDICINE

## 2020-11-23 ENCOUNTER — PATIENT MESSAGE (OUTPATIENT)
Dept: MEDICAL GROUP | Facility: MEDICAL CENTER | Age: 65
End: 2020-11-23

## 2020-11-23 DIAGNOSIS — R51.9 ACUTE INTRACTABLE HEADACHE, UNSPECIFIED HEADACHE TYPE: ICD-10-CM

## 2020-11-23 PROCEDURE — RXMED WILLOW AMBULATORY MEDICATION CHARGE: Performed by: FAMILY MEDICINE

## 2020-11-23 RX ORDER — DILTIAZEM HYDROCHLORIDE 300 MG/1
CAPSULE, COATED, EXTENDED RELEASE ORAL
Qty: 30 CAP | Refills: 6 | Status: SHIPPED | OUTPATIENT
Start: 2020-11-23 | End: 2021-07-07 | Stop reason: SDUPTHER

## 2020-11-23 RX ORDER — SUMATRIPTAN 100 MG/1
100 TABLET, FILM COATED ORAL
Qty: 10 TAB | Refills: 3 | Status: SHIPPED | OUTPATIENT
Start: 2020-11-23 | End: 2021-08-31

## 2020-11-24 ENCOUNTER — HOSPITAL ENCOUNTER (OUTPATIENT)
Dept: RADIOLOGY | Facility: MEDICAL CENTER | Age: 65
End: 2020-11-24
Attending: FAMILY MEDICINE
Payer: MEDICAID

## 2020-11-24 ENCOUNTER — TELEPHONE (OUTPATIENT)
Dept: MEDICAL GROUP | Facility: MEDICAL CENTER | Age: 65
End: 2020-11-24

## 2020-11-24 ENCOUNTER — PHARMACY VISIT (OUTPATIENT)
Dept: PHARMACY | Facility: MEDICAL CENTER | Age: 65
End: 2020-11-24
Payer: COMMERCIAL

## 2020-11-24 DIAGNOSIS — R51.9 ACUTE INTRACTABLE HEADACHE, UNSPECIFIED HEADACHE TYPE: ICD-10-CM

## 2020-11-24 DIAGNOSIS — J01.00 SUBACUTE MAXILLARY SINUSITIS: ICD-10-CM

## 2020-11-24 PROCEDURE — RXMED WILLOW AMBULATORY MEDICATION CHARGE: Performed by: FAMILY MEDICINE

## 2020-11-24 PROCEDURE — 70553 MRI BRAIN STEM W/O & W/DYE: CPT

## 2020-11-24 PROCEDURE — A9576 INJ PROHANCE MULTIPACK: HCPCS | Performed by: FAMILY MEDICINE

## 2020-11-24 PROCEDURE — 700117 HCHG RX CONTRAST REV CODE 255: Performed by: FAMILY MEDICINE

## 2020-11-24 RX ORDER — AMOXICILLIN 500 MG/1
500 CAPSULE ORAL 3 TIMES DAILY
Qty: 30 CAP | Refills: 0 | Status: SHIPPED | OUTPATIENT
Start: 2020-11-24 | End: 2023-04-07

## 2020-11-24 RX ADMIN — GADOTERIDOL 20 ML: 279.3 INJECTION, SOLUTION INTRAVENOUS at 07:33

## 2020-11-24 NOTE — TELEPHONE ENCOUNTER
From: Allen Mccabe  To: Michel Triana M.D.  Sent: 11/23/2020 6:13 PM PST  Subject: Prescription Question    Is there something else you would prefer to try? I only mentioned the imitrix because the pharmacist brought it up.    Allen      ----- Message -----   From:Michel Triana M.D.   Sent:11/23/2020 5:25 PM PST   To:Allen Mccabe   Subject:RE: Prescription Question    Allen, we can try Imitrex, it is used for typically acute onset migraine headaches individually. You may take 1 or at most 2/day. Insurance limits the prescription to 10 doses per month so multidoses each day are not possible. Will send Rx to your pharmacy and see if it has any benefit. Your headache profile does not match classic intermittent migraine headaches.      ----- Message -----   From:Allen Mccabe   Sent:11/23/2020 6:47 AM PST   To:Michel Triana M.D.   Subject:Prescription Question    Doc;  Good news. My mri has been moved up to 11/24 due to 2 [facility] locations being shut down. Go figure. Now the the not so good news. The fiorecet has not helped with headaches. And increasing hydrocodone/hydromorphine intake does not help either. It only taxes my supply for pain management.  The pharmacist mentioned a rx called immitrex as something that might help if fiorecet not effective.  I don't want to be troublesome regarding this matter however the headaches are affecting my quality of life at an elevated level. Is there something else we can try. I am not asking for more opiates as the ones I have, have provided no additional relief.    allen

## 2020-11-29 ENCOUNTER — PATIENT MESSAGE (OUTPATIENT)
Dept: MEDICAL GROUP | Facility: MEDICAL CENTER | Age: 65
End: 2020-11-29

## 2020-12-01 ENCOUNTER — PATIENT MESSAGE (OUTPATIENT)
Dept: MEDICAL GROUP | Facility: MEDICAL CENTER | Age: 65
End: 2020-12-01

## 2020-12-01 DIAGNOSIS — G50.0 TRIGEMINAL NEURALGIA: ICD-10-CM

## 2020-12-01 NOTE — PROGRESS NOTES
Recovery from bacterial sinusitis can be slow (10-14 days).  Do you have any localized pain coming from 1 or 2 teeth that we would need a dentist to resolve?

## 2020-12-03 RX ORDER — CARBAMAZEPINE 200 MG/1
200 TABLET ORAL 2 TIMES DAILY
Qty: 60 TAB | Refills: 0 | Status: SHIPPED | OUTPATIENT
Start: 2020-12-03 | End: 2023-04-07

## 2020-12-06 PROCEDURE — RXMED WILLOW AMBULATORY MEDICATION CHARGE: Performed by: FAMILY MEDICINE

## 2020-12-07 ENCOUNTER — PHARMACY VISIT (OUTPATIENT)
Dept: PHARMACY | Facility: MEDICAL CENTER | Age: 65
End: 2020-12-07
Payer: COMMERCIAL

## 2020-12-10 ENCOUNTER — PHARMACY VISIT (OUTPATIENT)
Dept: PHARMACY | Facility: MEDICAL CENTER | Age: 65
End: 2020-12-10
Payer: COMMERCIAL

## 2020-12-10 ENCOUNTER — OFFICE VISIT (OUTPATIENT)
Dept: MEDICAL GROUP | Facility: MEDICAL CENTER | Age: 65
End: 2020-12-10
Attending: FAMILY MEDICINE
Payer: MEDICAID

## 2020-12-10 VITALS
SYSTOLIC BLOOD PRESSURE: 138 MMHG | WEIGHT: 220.7 LBS | BODY MASS INDEX: 33.45 KG/M2 | RESPIRATION RATE: 16 BRPM | HEIGHT: 68 IN | HEART RATE: 79 BPM | TEMPERATURE: 97.2 F | DIASTOLIC BLOOD PRESSURE: 76 MMHG | OXYGEN SATURATION: 96 %

## 2020-12-10 DIAGNOSIS — R51.9 LEFT FACIAL PAIN: ICD-10-CM

## 2020-12-10 PROCEDURE — 99212 OFFICE O/P EST SF 10 MIN: CPT | Performed by: FAMILY MEDICINE

## 2020-12-10 PROCEDURE — RXMED WILLOW AMBULATORY MEDICATION CHARGE: Performed by: FAMILY MEDICINE

## 2020-12-10 PROCEDURE — 99213 OFFICE O/P EST LOW 20 MIN: CPT | Performed by: FAMILY MEDICINE

## 2020-12-10 RX ORDER — PREDNISONE 10 MG/1
TABLET ORAL
Qty: 50 TAB | Refills: 0 | Status: SHIPPED | OUTPATIENT
Start: 2020-12-10 | End: 2023-04-07

## 2020-12-10 NOTE — PROGRESS NOTES
"Subjective:      Allen Mccabe is a 65 y.o. male who presents with Follow-Up (re-eval)            HPI 1.  Left facial pain-patient reports continued fluctuating left facial pain that began about 3 weeks ago.  He was just started on carbamazepine 3 days ago.  Reports that in the past 24 hours the left-sided facial pain has improved by about 70%.  He does not experience sharp lancinating pains more like increased episodes of diffuse aching pain that seems to be centered on the posterior teeth upper and left lower jaw.  He has not had any significant history of dental infections and no recent fever.    ROS negative for recent rash, tinnitus, altered taste       Objective:     /76 (BP Location: Left arm, Patient Position: Sitting, BP Cuff Size: Adult)   Pulse 79   Temp 36.2 °C (97.2 °F) (Temporal)   Resp 16   Ht 1.727 m (5' 8\")   Wt 100.1 kg (220 lb 11.2 oz)   SpO2 96%   BMI 33.56 kg/m²      Physical Exam  Gen.- alert, cooperative, in no acute distress  Neck- midline trachea, thyroid not enlarged or tender,supple, no cervical adenopathy  Chest-clear to auscultation and percussion with normal breath sounds. No retractions. Chest wall nontender  Cardiac- regular rhythm and rate. No murmur, thrill, or heave  Face-negative for deformity, intact light touch bilaterally, negative for thickened temporal artery bilaterally          Assessment/Plan:        1. Left facial pain    - predniSONE (DELTASONE) 10 MG Tab; 6 p.o. daily  Dispense: 50 Tab; Refill: 0  - Sed Rate; Future  - CBC WITH DIFFERENTIAL; Future  Plan: 1.  Due to the concern that he may be experiencing temporal arteritis we will obtain a sed rate and CMP  2.  We will also preemptively start him on prednisone 60 mg daily over the next 7 days  3.  May continue on carbamazepine as well  "

## 2020-12-14 ENCOUNTER — PATIENT MESSAGE (OUTPATIENT)
Dept: MEDICAL GROUP | Facility: MEDICAL CENTER | Age: 65
End: 2020-12-14

## 2020-12-17 ENCOUNTER — TELEPHONE (OUTPATIENT)
Dept: MEDICAL GROUP | Facility: MEDICAL CENTER | Age: 65
End: 2020-12-17

## 2020-12-19 DIAGNOSIS — I10 ESSENTIAL HYPERTENSION: ICD-10-CM

## 2020-12-19 PROCEDURE — RXMED WILLOW AMBULATORY MEDICATION CHARGE: Performed by: FAMILY MEDICINE

## 2020-12-21 PROCEDURE — RXMED WILLOW AMBULATORY MEDICATION CHARGE: Performed by: FAMILY MEDICINE

## 2020-12-21 RX ORDER — TRIAMTERENE AND HYDROCHLOROTHIAZIDE 37.5; 25 MG/1; MG/1
TABLET ORAL
Qty: 30 TAB | Refills: 5 | Status: SHIPPED | OUTPATIENT
Start: 2020-12-21 | End: 2021-07-07 | Stop reason: SDUPTHER

## 2020-12-21 RX ORDER — TERAZOSIN 5 MG/1
CAPSULE ORAL
Qty: 30 CAP | Refills: 5 | Status: SHIPPED | OUTPATIENT
Start: 2020-12-21 | End: 2021-07-07 | Stop reason: SDUPTHER

## 2020-12-22 ENCOUNTER — OFFICE VISIT (OUTPATIENT)
Dept: MEDICAL GROUP | Facility: MEDICAL CENTER | Age: 65
End: 2020-12-22
Attending: FAMILY MEDICINE
Payer: MEDICAID

## 2020-12-22 ENCOUNTER — PHARMACY VISIT (OUTPATIENT)
Dept: PHARMACY | Facility: MEDICAL CENTER | Age: 65
End: 2020-12-22
Payer: COMMERCIAL

## 2020-12-22 VITALS
OXYGEN SATURATION: 93 % | WEIGHT: 218.2 LBS | SYSTOLIC BLOOD PRESSURE: 140 MMHG | HEART RATE: 88 BPM | DIASTOLIC BLOOD PRESSURE: 60 MMHG | TEMPERATURE: 97.8 F | RESPIRATION RATE: 18 BRPM | BODY MASS INDEX: 33.07 KG/M2 | HEIGHT: 68 IN

## 2020-12-22 DIAGNOSIS — N30.10 INTERSTITIAL CYSTITIS (CHRONIC) WITHOUT HEMATURIA: ICD-10-CM

## 2020-12-22 DIAGNOSIS — R51.9 FRONTAL HEADACHE: ICD-10-CM

## 2020-12-22 DIAGNOSIS — R10.2 MALE PELVIC PAIN: ICD-10-CM

## 2020-12-22 PROCEDURE — 99213 OFFICE O/P EST LOW 20 MIN: CPT | Performed by: FAMILY MEDICINE

## 2020-12-22 PROCEDURE — RXMED WILLOW AMBULATORY MEDICATION CHARGE: Performed by: FAMILY MEDICINE

## 2020-12-22 RX ORDER — HYDROCODONE BITARTRATE AND ACETAMINOPHEN 10; 325 MG/1; MG/1
1 TABLET ORAL EVERY 6 HOURS PRN
Qty: 112 TAB | Refills: 0 | OUTPATIENT
Start: 2021-01-19 | End: 2023-04-07

## 2020-12-22 RX ORDER — HYDROCODONE BITARTRATE AND ACETAMINOPHEN 10; 325 MG/1; MG/1
1 TABLET ORAL EVERY 6 HOURS PRN
Qty: 112 TAB | Refills: 0 | Status: SHIPPED | OUTPATIENT
Start: 2020-12-22 | End: 2020-12-22 | Stop reason: SDUPTHER

## 2020-12-22 RX ORDER — HYDROCODONE BITARTRATE AND ACETAMINOPHEN 10; 325 MG/1; MG/1
1 TABLET ORAL EVERY 6 HOURS PRN
Qty: 112 TAB | Refills: 0 | Status: SHIPPED | OUTPATIENT
Start: 2021-02-16 | End: 2021-03-14 | Stop reason: SDUPTHER

## 2020-12-22 RX ORDER — HYDROCODONE BITARTRATE AND ACETAMINOPHEN 10; 325 MG/1; MG/1
1 TABLET ORAL EVERY 6 HOURS PRN
Qty: 112 TAB | Refills: 0 | OUTPATIENT
Start: 2020-12-22 | End: 2023-04-07

## 2020-12-22 RX ORDER — HYDROMORPHONE HYDROCHLORIDE 4 MG/1
4 TABLET ORAL EVERY 6 HOURS PRN
Qty: 45 TAB | Refills: 0 | Status: SHIPPED | OUTPATIENT
Start: 2020-12-22 | End: 2021-02-17 | Stop reason: SDUPTHER

## 2020-12-22 RX ORDER — HYDROCODONE BITARTRATE AND ACETAMINOPHEN 10; 325 MG/1; MG/1
1 TABLET ORAL EVERY 6 HOURS PRN
Qty: 112 TAB | Refills: 0 | Status: SHIPPED | OUTPATIENT
Start: 2021-01-19 | End: 2020-12-22 | Stop reason: SDUPTHER

## 2020-12-22 NOTE — PROGRESS NOTES
"Subjective:      Allen Mccabe is a 65 y.o. male who presents with Medication Refill and Follow-Up            HPI 1.  Bifrontal headache-patient reports he has a mild to moderate bifrontal headache that began about 24 hours ago.  The previous severe left dental and left-sided jaw pain cleared after 2 days on the 10-day course of prednisone.  Patient to discontinued the carbamazepine initially and then finished the 10-day course of prednisone.  He has now been off prednisone for 2 days.  Has not had any further left-sided facial pain for over 1 week.  Current bifrontal headache is an intensity of about 4-5 out of 10, patient reports occasional similar headaches of this variety in the distant past.  2.  Interstitial cystitis/pelvic pain-patient continues to have daily pelvic pain.  He typically is taking Norco 10/325 4 times daily.  Occasionally for severe breakthrough pain will add a 4 mg dose of hydromorphone utilizing 45 doses typically in 60 days timeframe.  Not reporting any constipation, confusion or lost prescriptions.    ROS negative for gross hematuria, urinary incontinence, diarrhea       Objective:     /60 (BP Location: Left arm, Patient Position: Sitting, BP Cuff Size: Adult long)   Pulse 88   Temp 36.6 °C (97.8 °F) (Temporal)   Resp 18   Ht 1.727 m (5' 8\")   Wt 99 kg (218 lb 3.2 oz)   SpO2 93%   BMI 33.18 kg/m²      Physical Exam  Gen.- alert, cooperative, in no acute distress  Neck- midline trachea, thyroid not enlarged or tender,supple, no cervical adenopathy  Chest-clear to auscultation and percussion with normal breath sounds. No retractions. Chest wall nontender  Cardiac- regular rhythm and rate. No murmur, thrill, or heave  Face-intact light touch without focal redness swelling or rash          Assessment/Plan:        1. Interstitial cystitis (chronic) without hematuria    - HYDROcodone/acetaminophen (NORCO)  MG Tab; Take 1 Tablet by mouth every 6 hours as needed for up to " 28 days  Dispense: 112 Tab; Refill: 0  - HYDROmorphone (DILAUDID) 4 MG Tab; Take 1 Tab by mouth every 6 hours as needed for Severe Pain for up to 60 days.  Dispense: 45 Tab; Refill: 0    2. Male pelvic pain      3. Frontal headache    Plan: 1.  Renew usual Norco and hydromorphone prescriptions  2.  Patient will contact us if he has a recurrence of the atypical left-sided facial pain that has now cleared  3.  Revisit in 3 months or sooner if needed

## 2021-01-07 PROCEDURE — RXMED WILLOW AMBULATORY MEDICATION CHARGE: Performed by: FAMILY MEDICINE

## 2021-01-12 ENCOUNTER — PHARMACY VISIT (OUTPATIENT)
Dept: PHARMACY | Facility: MEDICAL CENTER | Age: 66
End: 2021-01-12
Payer: COMMERCIAL

## 2021-01-17 PROCEDURE — RXMED WILLOW AMBULATORY MEDICATION CHARGE: Performed by: FAMILY MEDICINE

## 2021-01-19 ENCOUNTER — PHARMACY VISIT (OUTPATIENT)
Dept: PHARMACY | Facility: MEDICAL CENTER | Age: 66
End: 2021-01-19
Payer: COMMERCIAL

## 2021-01-19 PROCEDURE — RXMED WILLOW AMBULATORY MEDICATION CHARGE: Performed by: FAMILY MEDICINE

## 2021-02-15 PROCEDURE — RXMED WILLOW AMBULATORY MEDICATION CHARGE: Performed by: OPTOMETRIST

## 2021-02-16 ENCOUNTER — PHARMACY VISIT (OUTPATIENT)
Dept: PHARMACY | Facility: MEDICAL CENTER | Age: 66
End: 2021-02-16
Payer: COMMERCIAL

## 2021-02-16 PROCEDURE — RXMED WILLOW AMBULATORY MEDICATION CHARGE: Performed by: FAMILY MEDICINE

## 2021-02-17 DIAGNOSIS — N30.10 INTERSTITIAL CYSTITIS (CHRONIC) WITHOUT HEMATURIA: ICD-10-CM

## 2021-02-17 PROCEDURE — RXMED WILLOW AMBULATORY MEDICATION CHARGE: Performed by: FAMILY MEDICINE

## 2021-02-18 ENCOUNTER — PHARMACY VISIT (OUTPATIENT)
Dept: PHARMACY | Facility: MEDICAL CENTER | Age: 66
End: 2021-02-18
Payer: COMMERCIAL

## 2021-02-18 PROCEDURE — RXMED WILLOW AMBULATORY MEDICATION CHARGE: Performed by: FAMILY MEDICINE

## 2021-02-18 RX ORDER — HYDROMORPHONE HYDROCHLORIDE 4 MG/1
4 TABLET ORAL EVERY 6 HOURS PRN
Qty: 45 TABLET | Refills: 0 | Status: SHIPPED | OUTPATIENT
Start: 2021-02-18 | End: 2021-04-20 | Stop reason: SDUPTHER

## 2021-02-19 ENCOUNTER — PHARMACY VISIT (OUTPATIENT)
Dept: PHARMACY | Facility: MEDICAL CENTER | Age: 66
End: 2021-02-19
Payer: COMMERCIAL

## 2021-03-09 DIAGNOSIS — Z23 NEED FOR VACCINATION: ICD-10-CM

## 2021-03-14 ENCOUNTER — PATIENT MESSAGE (OUTPATIENT)
Dept: MEDICAL GROUP | Facility: MEDICAL CENTER | Age: 66
End: 2021-03-14

## 2021-03-14 DIAGNOSIS — N30.10 INTERSTITIAL CYSTITIS (CHRONIC) WITHOUT HEMATURIA: ICD-10-CM

## 2021-03-15 ENCOUNTER — TELEPHONE (OUTPATIENT)
Dept: MEDICAL GROUP | Facility: MEDICAL CENTER | Age: 66
End: 2021-03-15

## 2021-03-15 RX ORDER — HYDROCODONE BITARTRATE AND ACETAMINOPHEN 10; 325 MG/1; MG/1
1 TABLET ORAL EVERY 6 HOURS PRN
Qty: 112 TABLET | Refills: 0 | Status: SHIPPED | OUTPATIENT
Start: 2021-03-15 | End: 2021-03-16 | Stop reason: SDUPTHER

## 2021-03-16 ENCOUNTER — PHARMACY VISIT (OUTPATIENT)
Dept: PHARMACY | Facility: MEDICAL CENTER | Age: 66
End: 2021-03-16
Payer: COMMERCIAL

## 2021-03-16 ENCOUNTER — OFFICE VISIT (OUTPATIENT)
Dept: MEDICAL GROUP | Facility: MEDICAL CENTER | Age: 66
End: 2021-03-16
Attending: FAMILY MEDICINE
Payer: MEDICARE

## 2021-03-16 VITALS
OXYGEN SATURATION: 97 % | HEIGHT: 68 IN | HEART RATE: 76 BPM | TEMPERATURE: 96.8 F | RESPIRATION RATE: 16 BRPM | SYSTOLIC BLOOD PRESSURE: 124 MMHG | DIASTOLIC BLOOD PRESSURE: 72 MMHG | BODY MASS INDEX: 33.65 KG/M2 | WEIGHT: 222 LBS

## 2021-03-16 DIAGNOSIS — N30.10 INTERSTITIAL CYSTITIS (CHRONIC) WITHOUT HEMATURIA: ICD-10-CM

## 2021-03-16 DIAGNOSIS — E66.9 OBESITY (BMI 30-39.9): ICD-10-CM

## 2021-03-16 DIAGNOSIS — L30.9 DERMATITIS: ICD-10-CM

## 2021-03-16 DIAGNOSIS — N30.10 INTERSTITIAL CYSTITIS: ICD-10-CM

## 2021-03-16 PROCEDURE — 99213 OFFICE O/P EST LOW 20 MIN: CPT | Performed by: FAMILY MEDICINE

## 2021-03-16 PROCEDURE — RXMED WILLOW AMBULATORY MEDICATION CHARGE: Performed by: FAMILY MEDICINE

## 2021-03-16 RX ORDER — HYDROCODONE BITARTRATE AND ACETAMINOPHEN 10; 325 MG/1; MG/1
1 TABLET ORAL EVERY 6 HOURS PRN
Qty: 112 TABLET | Refills: 0 | Status: SHIPPED | OUTPATIENT
Start: 2021-04-13 | End: 2021-05-11 | Stop reason: SDUPTHER

## 2021-03-16 RX ORDER — HYDROCODONE BITARTRATE AND ACETAMINOPHEN 10; 325 MG/1; MG/1
1 TABLET ORAL EVERY 6 HOURS PRN
Qty: 112 TABLET | Refills: 0 | Status: SHIPPED | OUTPATIENT
Start: 2021-03-16 | End: 2021-04-13

## 2021-03-16 NOTE — PROGRESS NOTES
"Subjective:      Allen Mccabe is a 66 y.o. male who presents with Follow-Up            HPI.  Interstitial cystitis-patient reports continued persistent suprapubic pain along with dysuria.  He controls pain partially by taking Norco 10/325 4 times daily.  Rarely will use a 4 mg hydromorphone tablet for severe breakthrough pain.  2.  Dermatitis-patient notes persistent pruritic areas on his upper back.  He has noted a persistent crusted area to the right side of midline over the past 3 to 4 weeks.  There has been no bleeding.    ROS       Objective:     /72   Pulse 76   Temp 36 °C (96.8 °F) (Temporal)   Resp 16   Ht 1.727 m (5' 8\")   Wt 101 kg (222 lb)   SpO2 97%   BMI 33.75 kg/m²      Physical Exam  Gen.- alert, cooperative, in no acute distress  Neck- midline trachea, thyroid not enlarged or tender,supple, no cervical adenopathy  Chest-clear to auscultation and percussion with normal breath sounds. No retractions. Chest wall nontender  Cardiac- regular rhythm and rate. No murmur, thrill, or heave  Skin-6 or 7 excoriations varying in size from 1 cm down to 4 mm are noted within the area of reach on both the right and left upper scapular area.  Particular area of patient's interest is about 1 cm in size with slightly rolled pink edges and a central 7-8 mm crust.          Assessment/Plan:        1. Interstitial cystitis    - HYDROcodone/acetaminophen (NORCO)  MG Tab; Take 1 tablet by mouth every 6 hours as needed for up to 28 days.  Dispense: 112 tablet; Refill: 0    2. Interstitial cystitis (chronic) without hematuria      3. Dermatitis      4. Obesity (BMI 30-39.9)    Plan: 1.  Continue Norco 10/325 up to 4 times daily-prescriptions are currently written through mid May, with follow-up in mid June  2.  Patient will apply Diprolene steroid ointment to pruritic area in the upper central back for 2 weeks to avoid scratching at that area if the crusted lesion persists he will call us back for " excision

## 2021-04-13 ENCOUNTER — PHARMACY VISIT (OUTPATIENT)
Dept: PHARMACY | Facility: MEDICAL CENTER | Age: 66
End: 2021-04-13
Payer: MEDICARE

## 2021-04-13 PROCEDURE — RXMED WILLOW AMBULATORY MEDICATION CHARGE: Performed by: FAMILY MEDICINE

## 2021-04-15 PROCEDURE — RXMED WILLOW AMBULATORY MEDICATION CHARGE: Performed by: FAMILY MEDICINE

## 2021-04-16 ENCOUNTER — PHARMACY VISIT (OUTPATIENT)
Dept: PHARMACY | Facility: MEDICAL CENTER | Age: 66
End: 2021-04-16
Payer: MEDICARE

## 2021-04-19 DIAGNOSIS — L30.9 DERMATITIS: ICD-10-CM

## 2021-04-19 PROCEDURE — RXMED WILLOW AMBULATORY MEDICATION CHARGE: Performed by: FAMILY MEDICINE

## 2021-04-19 PROCEDURE — RXMED WILLOW AMBULATORY MEDICATION CHARGE: Performed by: OPTOMETRIST

## 2021-04-19 RX ORDER — BETAMETHASONE DIPROPIONATE 0.05 %
OINTMENT (GRAM) TOPICAL
Qty: 45 G | Refills: 0 | Status: SHIPPED | OUTPATIENT
Start: 2021-04-19 | End: 2021-11-05 | Stop reason: SDUPTHER

## 2021-04-20 DIAGNOSIS — N30.10 INTERSTITIAL CYSTITIS (CHRONIC) WITHOUT HEMATURIA: ICD-10-CM

## 2021-04-22 ENCOUNTER — PHARMACY VISIT (OUTPATIENT)
Dept: PHARMACY | Facility: MEDICAL CENTER | Age: 66
End: 2021-04-22
Payer: MEDICARE

## 2021-04-22 DIAGNOSIS — G89.4 CHRONIC PAIN SYNDROME: ICD-10-CM

## 2021-04-22 PROCEDURE — RXMED WILLOW AMBULATORY MEDICATION CHARGE: Performed by: FAMILY MEDICINE

## 2021-04-22 RX ORDER — HYDROMORPHONE HYDROCHLORIDE 4 MG/1
4 TABLET ORAL EVERY 6 HOURS PRN
Qty: 45 TABLET | Refills: 0 | Status: SHIPPED | OUTPATIENT
Start: 2021-04-22 | End: 2021-06-21 | Stop reason: SDUPTHER

## 2021-04-22 RX ORDER — LIDOCAINE 50 MG/G
OINTMENT TOPICAL
Qty: 35.44 G | Refills: 0 | Status: SHIPPED | OUTPATIENT
Start: 2021-04-22 | End: 2023-04-07

## 2021-05-09 PROCEDURE — RXMED WILLOW AMBULATORY MEDICATION CHARGE: Performed by: FAMILY MEDICINE

## 2021-05-11 ENCOUNTER — PHARMACY VISIT (OUTPATIENT)
Dept: PHARMACY | Facility: MEDICAL CENTER | Age: 66
End: 2021-05-11
Payer: MEDICARE

## 2021-05-11 DIAGNOSIS — N30.10 INTERSTITIAL CYSTITIS: ICD-10-CM

## 2021-05-11 PROCEDURE — RXMED WILLOW AMBULATORY MEDICATION CHARGE: Performed by: FAMILY MEDICINE

## 2021-05-11 RX ORDER — HYDROCODONE BITARTRATE AND ACETAMINOPHEN 10; 325 MG/1; MG/1
1 TABLET ORAL EVERY 6 HOURS PRN
Qty: 112 TABLET | Refills: 0 | Status: SHIPPED | OUTPATIENT
Start: 2021-05-11 | End: 2021-06-08 | Stop reason: SDUPTHER

## 2021-05-26 PROCEDURE — RXMED WILLOW AMBULATORY MEDICATION CHARGE: Performed by: OPTOMETRIST

## 2021-05-28 ENCOUNTER — PHARMACY VISIT (OUTPATIENT)
Dept: PHARMACY | Facility: MEDICAL CENTER | Age: 66
End: 2021-05-28
Payer: MEDICARE

## 2021-06-08 ENCOUNTER — OFFICE VISIT (OUTPATIENT)
Dept: MEDICAL GROUP | Facility: MEDICAL CENTER | Age: 66
End: 2021-06-08
Attending: FAMILY MEDICINE
Payer: MEDICARE

## 2021-06-08 ENCOUNTER — PHARMACY VISIT (OUTPATIENT)
Dept: PHARMACY | Facility: MEDICAL CENTER | Age: 66
End: 2021-06-08
Payer: COMMERCIAL

## 2021-06-08 ENCOUNTER — PATIENT MESSAGE (OUTPATIENT)
Dept: MEDICAL GROUP | Facility: MEDICAL CENTER | Age: 66
End: 2021-06-08

## 2021-06-08 VITALS
HEIGHT: 68 IN | WEIGHT: 228 LBS | HEART RATE: 88 BPM | SYSTOLIC BLOOD PRESSURE: 128 MMHG | OXYGEN SATURATION: 95 % | DIASTOLIC BLOOD PRESSURE: 68 MMHG | TEMPERATURE: 98.1 F | BODY MASS INDEX: 34.56 KG/M2 | RESPIRATION RATE: 16 BRPM

## 2021-06-08 DIAGNOSIS — M25.562 BILATERAL CHRONIC KNEE PAIN: ICD-10-CM

## 2021-06-08 DIAGNOSIS — N30.10 INTERSTITIAL CYSTITIS: ICD-10-CM

## 2021-06-08 DIAGNOSIS — M25.561 BILATERAL CHRONIC KNEE PAIN: ICD-10-CM

## 2021-06-08 DIAGNOSIS — G89.29 BILATERAL CHRONIC KNEE PAIN: ICD-10-CM

## 2021-06-08 DIAGNOSIS — E66.9 OBESITY (BMI 30-39.9): ICD-10-CM

## 2021-06-08 DIAGNOSIS — I10 ESSENTIAL HYPERTENSION: ICD-10-CM

## 2021-06-08 PROCEDURE — 99214 OFFICE O/P EST MOD 30 MIN: CPT | Performed by: FAMILY MEDICINE

## 2021-06-08 PROCEDURE — RXMED WILLOW AMBULATORY MEDICATION CHARGE: Performed by: FAMILY MEDICINE

## 2021-06-08 RX ORDER — PHENTERMINE HYDROCHLORIDE 37.5 MG/1
37.5 TABLET ORAL
Qty: 30 TABLET | Refills: 0 | Status: SHIPPED | OUTPATIENT
Start: 2021-06-08 | End: 2021-07-08

## 2021-06-08 RX ORDER — HYDROCODONE BITARTRATE AND ACETAMINOPHEN 10; 325 MG/1; MG/1
1 TABLET ORAL EVERY 6 HOURS PRN
Qty: 112 TABLET | Refills: 0 | Status: SHIPPED | OUTPATIENT
Start: 2021-07-06 | End: 2021-08-03

## 2021-06-08 RX ORDER — HYDROCODONE BITARTRATE AND ACETAMINOPHEN 10; 325 MG/1; MG/1
1 TABLET ORAL EVERY 6 HOURS PRN
Qty: 112 TABLET | Refills: 0 | Status: SHIPPED | OUTPATIENT
Start: 2021-06-08 | End: 2021-07-06

## 2021-06-08 RX ORDER — HYDROCODONE BITARTRATE AND ACETAMINOPHEN 10; 325 MG/1; MG/1
1 TABLET ORAL EVERY 6 HOURS PRN
Qty: 112 TABLET | Refills: 0 | Status: SHIPPED | OUTPATIENT
Start: 2021-08-03 | End: 2021-08-31

## 2021-06-08 NOTE — PROGRESS NOTES
"Subjective:      Aleln Mccabe is a 66 y.o. male who presents with Cystitis (interstitial cystitis)            HPI 1.  Chronic pain-patient continues to experience daily suprapubic pain associated with interstitial cystitis. Elmiron was ineffective.  He finds that if he avoids alcohol pain levels are somewhat improved.  He continues to take Norco 10/325 4 times daily. Lower doses have been ineffective at significant pain relief. Pt has a pain management contract with us. Also reports increasing pain from now both knees with weightbearing.  This has caused him to cut back from walking 3-4 times per week to walking just once a week with his sister.  Not reporting any knee effusions or buckling.  2.  Obesity-patient is frustrated that he has gained 6 pounds since he has reduced his weekly walking regimen.  He is already trying to restrict portions fairly effectively.  Not reporting any unexplained hair loss or cold intolerance.  3.  Essential hypertension-patient is not reporting any current chest pain, palpitations or chest pressure.  He is compliant taking Cotter and 5 mg, diltiazem 300 mg    ROSneg for constipation, urinary incontinence, hematuria, cough, dyspnea       Objective:     /68   Pulse 88   Temp 36.7 °C (98.1 °F) (Temporal)   Resp 16   Ht 1.727 m (5' 7.99\")   Wt 103 kg (228 lb)   SpO2 95%   BMI 34.68 kg/m²      Physical Exam  Gen.- alert, cooperative, in no acute distress  Neck- midline trachea, thyroid not enlarged or tender,supple, no cervical adenopathy  Chest-clear to auscultation and percussion with normal breath sounds. No retractions. Chest wall nontender  Cardiac- regular rhythm and rate. No murmur, thrill, or heave  Abdomen- normal bowel sounds, soft without guarding. Liver and spleen not enlarged, no palpable masses or tenderness.    Skin-Skin is clear without rash, edema, redness, or cyanosis.Single 3mm excoriation noted R scapular area without mass, discoloration or " pigmentation                Assessment/Plan:        1. Obesity (BMI 30-39.9)    - TSH; Future  - phentermine (ADIPEX-P) 37.5 MG tablet; Take 1 tablet by mouth every morning before breakfast for 30 days.  Dispense: 30 tablet; Refill: 0    2. Essential hypertension    - Comp Metabolic Panel; Future    3. Interstitial cystitis    - HYDROcodone/acetaminophen (NORCO)  MG Tab; Take 1 tablet by mouth every 6 hours as needed for up to 28 days.  Dispense: 112 tablet; Refill: 0  - HYDROcodone/acetaminophen (NORCO)  MG Tab; Take 1 tablet by mouth every 6 hours as needed for Severe Pain for up to 28 days.  Dispense: 112 tablet; Refill: 0  - HYDROcodone/acetaminophen (NORCO)  MG Tab; Take 1 tablet by mouth every 6 hours as needed for Severe Pain for up to 28 days.  Dispense: 112 tablet; Refill: 0  - URINE DRUG SCREEN W/CONF (SEND OUT); Standing  - URINE DRUG SCREEN W/CONF (SEND OUT)    4. Bilateral chronic knee pain    - DX-KNEE COMPLETE 4+ LEFT; Future  - DX-KNEE COMPLETE 4+ RIGHT; Future  PLan: 1. Renew Norco 10/325 at qid prn severe pain  2. Update urine drug screen  3. Bilat knee XRs  4. Update CMP, TSH  5. Trial phentermine 37.5 mg Q AM  6. Revisit 1 month

## 2021-06-10 ENCOUNTER — HOSPITAL ENCOUNTER (OUTPATIENT)
Dept: RADIOLOGY | Facility: MEDICAL CENTER | Age: 66
End: 2021-06-10
Attending: FAMILY MEDICINE
Payer: MEDICARE

## 2021-06-10 ENCOUNTER — HOSPITAL ENCOUNTER (OUTPATIENT)
Dept: LAB | Facility: MEDICAL CENTER | Age: 66
End: 2021-06-10
Attending: FAMILY MEDICINE
Payer: MEDICARE

## 2021-06-10 DIAGNOSIS — M25.561 BILATERAL CHRONIC KNEE PAIN: ICD-10-CM

## 2021-06-10 DIAGNOSIS — M25.562 BILATERAL CHRONIC KNEE PAIN: ICD-10-CM

## 2021-06-10 DIAGNOSIS — G89.29 BILATERAL CHRONIC KNEE PAIN: ICD-10-CM

## 2021-06-10 PROCEDURE — 73564 X-RAY EXAM KNEE 4 OR MORE: CPT | Mod: RT

## 2021-06-10 PROCEDURE — 73564 X-RAY EXAM KNEE 4 OR MORE: CPT | Mod: LT

## 2021-06-11 ENCOUNTER — HOSPITAL ENCOUNTER (OUTPATIENT)
Dept: LAB | Facility: MEDICAL CENTER | Age: 66
End: 2021-06-11
Attending: FAMILY MEDICINE
Payer: MEDICARE

## 2021-06-11 ENCOUNTER — PHARMACY VISIT (OUTPATIENT)
Dept: PHARMACY | Facility: MEDICAL CENTER | Age: 66
End: 2021-06-11
Payer: MEDICARE

## 2021-06-11 DIAGNOSIS — E66.9 OBESITY (BMI 30-39.9): ICD-10-CM

## 2021-06-11 DIAGNOSIS — I10 ESSENTIAL HYPERTENSION: ICD-10-CM

## 2021-06-11 DIAGNOSIS — R51.9 LEFT FACIAL PAIN: ICD-10-CM

## 2021-06-11 LAB
ALBUMIN SERPL BCP-MCNC: 4.2 G/DL (ref 3.2–4.9)
ALBUMIN/GLOB SERPL: 1.7 G/DL
ALP SERPL-CCNC: 80 U/L (ref 30–99)
ALT SERPL-CCNC: 19 U/L (ref 2–50)
ANION GAP SERPL CALC-SCNC: 12 MMOL/L (ref 7–16)
AST SERPL-CCNC: 18 U/L (ref 12–45)
BASOPHILS # BLD AUTO: 1.2 % (ref 0–1.8)
BASOPHILS # BLD: 0.08 K/UL (ref 0–0.12)
BILIRUB SERPL-MCNC: 0.5 MG/DL (ref 0.1–1.5)
BUN SERPL-MCNC: 22 MG/DL (ref 8–22)
CALCIUM SERPL-MCNC: 9 MG/DL (ref 8.5–10.5)
CHLORIDE SERPL-SCNC: 104 MMOL/L (ref 96–112)
CO2 SERPL-SCNC: 27 MMOL/L (ref 20–33)
CREAT SERPL-MCNC: 0.71 MG/DL (ref 0.5–1.4)
EOSINOPHIL # BLD AUTO: 0.45 K/UL (ref 0–0.51)
EOSINOPHIL NFR BLD: 6.5 % (ref 0–6.9)
ERYTHROCYTE [DISTWIDTH] IN BLOOD BY AUTOMATED COUNT: 37.1 FL (ref 35.9–50)
ERYTHROCYTE [SEDIMENTATION RATE] IN BLOOD BY WESTERGREN METHOD: 5 MM/HOUR (ref 0–20)
FASTING STATUS PATIENT QL REPORTED: NORMAL
GLOBULIN SER CALC-MCNC: 2.5 G/DL (ref 1.9–3.5)
GLUCOSE SERPL-MCNC: 111 MG/DL (ref 65–99)
HCT VFR BLD AUTO: 44.3 % (ref 42–52)
HGB BLD-MCNC: 14.9 G/DL (ref 14–18)
IMM GRANULOCYTES # BLD AUTO: 0.02 K/UL (ref 0–0.11)
IMM GRANULOCYTES NFR BLD AUTO: 0.3 % (ref 0–0.9)
LYMPHOCYTES # BLD AUTO: 2.3 K/UL (ref 1–4.8)
LYMPHOCYTES NFR BLD: 33.2 % (ref 22–41)
MCH RBC QN AUTO: 28.8 PG (ref 27–33)
MCHC RBC AUTO-ENTMCNC: 33.6 G/DL (ref 33.7–35.3)
MCV RBC AUTO: 85.7 FL (ref 81.4–97.8)
MONOCYTES # BLD AUTO: 0.6 K/UL (ref 0–0.85)
MONOCYTES NFR BLD AUTO: 8.7 % (ref 0–13.4)
NEUTROPHILS # BLD AUTO: 3.48 K/UL (ref 1.82–7.42)
NEUTROPHILS NFR BLD: 50.1 % (ref 44–72)
NRBC # BLD AUTO: 0 K/UL
NRBC BLD-RTO: 0 /100 WBC
PLATELET # BLD AUTO: 312 K/UL (ref 164–446)
PMV BLD AUTO: 9.9 FL (ref 9–12.9)
POTASSIUM SERPL-SCNC: 3.7 MMOL/L (ref 3.6–5.5)
PROT SERPL-MCNC: 6.7 G/DL (ref 6–8.2)
RBC # BLD AUTO: 5.17 M/UL (ref 4.7–6.1)
SODIUM SERPL-SCNC: 143 MMOL/L (ref 135–145)
WBC # BLD AUTO: 6.9 K/UL (ref 4.8–10.8)

## 2021-06-11 PROCEDURE — 36415 COLL VENOUS BLD VENIPUNCTURE: CPT

## 2021-06-11 PROCEDURE — 80053 COMPREHEN METABOLIC PANEL: CPT

## 2021-06-11 PROCEDURE — 85025 COMPLETE CBC W/AUTO DIFF WBC: CPT

## 2021-06-11 PROCEDURE — 85652 RBC SED RATE AUTOMATED: CPT

## 2021-06-11 PROCEDURE — 84443 ASSAY THYROID STIM HORMONE: CPT

## 2021-06-12 LAB — TSH SERPL DL<=0.005 MIU/L-ACNC: 1.65 UIU/ML (ref 0.38–5.33)

## 2021-06-18 ENCOUNTER — TELEPHONE (OUTPATIENT)
Dept: MEDICAL GROUP | Facility: MEDICAL CENTER | Age: 66
End: 2021-06-18

## 2021-06-18 NOTE — TELEPHONE ENCOUNTER
----- Message from Michel Triana M.D. sent at 6/10/2021  6:36 PM PDT -----  This results is for the left knee, previous result note was for the right knee.  Left knee shows moderate degenerative arthritic change and probably a small calcified mass inside the joint.  Not too much worse than what they saw in 2017 as far as arthritic change.

## 2021-06-21 DIAGNOSIS — N30.10 INTERSTITIAL CYSTITIS (CHRONIC) WITHOUT HEMATURIA: ICD-10-CM

## 2021-06-21 PROCEDURE — RXMED WILLOW AMBULATORY MEDICATION CHARGE: Performed by: OPTOMETRIST

## 2021-06-21 RX ORDER — HYDROMORPHONE HYDROCHLORIDE 4 MG/1
4 TABLET ORAL EVERY 6 HOURS PRN
Qty: 45 TABLET | Refills: 0 | Status: SHIPPED | OUTPATIENT
Start: 2021-06-21 | End: 2021-08-18 | Stop reason: SDUPTHER

## 2021-06-22 ENCOUNTER — PHARMACY VISIT (OUTPATIENT)
Dept: PHARMACY | Facility: MEDICAL CENTER | Age: 66
End: 2021-06-22
Payer: MEDICARE

## 2021-06-22 PROCEDURE — RXMED WILLOW AMBULATORY MEDICATION CHARGE: Performed by: FAMILY MEDICINE

## 2021-07-04 PROCEDURE — RXMED WILLOW AMBULATORY MEDICATION CHARGE: Performed by: FAMILY MEDICINE

## 2021-07-06 ENCOUNTER — PHARMACY VISIT (OUTPATIENT)
Dept: PHARMACY | Facility: MEDICAL CENTER | Age: 66
End: 2021-07-06
Payer: MEDICARE

## 2021-07-06 PROCEDURE — RXMED WILLOW AMBULATORY MEDICATION CHARGE: Performed by: FAMILY MEDICINE

## 2021-07-07 DIAGNOSIS — I10 ESSENTIAL HYPERTENSION: ICD-10-CM

## 2021-07-07 RX ORDER — TRIAMTERENE AND HYDROCHLOROTHIAZIDE 37.5; 25 MG/1; MG/1
TABLET ORAL
Qty: 30 TABLET | Refills: 6 | Status: SHIPPED | OUTPATIENT
Start: 2021-07-07 | End: 2022-02-02 | Stop reason: SDUPTHER

## 2021-07-07 RX ORDER — DILTIAZEM HYDROCHLORIDE 300 MG/1
CAPSULE, COATED, EXTENDED RELEASE ORAL
Qty: 30 CAPSULE | Refills: 6 | Status: SHIPPED | OUTPATIENT
Start: 2021-07-07 | End: 2022-02-02 | Stop reason: SDUPTHER

## 2021-07-07 RX ORDER — TERAZOSIN 5 MG/1
CAPSULE ORAL
Qty: 30 CAPSULE | Refills: 6 | Status: SHIPPED | OUTPATIENT
Start: 2021-07-07 | End: 2022-02-02 | Stop reason: SDUPTHER

## 2021-07-08 ENCOUNTER — PHARMACY VISIT (OUTPATIENT)
Dept: PHARMACY | Facility: MEDICAL CENTER | Age: 66
End: 2021-07-08
Payer: MEDICARE

## 2021-07-08 PROCEDURE — RXMED WILLOW AMBULATORY MEDICATION CHARGE: Performed by: FAMILY MEDICINE

## 2021-07-18 PROCEDURE — RXMED WILLOW AMBULATORY MEDICATION CHARGE: Performed by: OPTOMETRIST

## 2021-07-20 ENCOUNTER — PHARMACY VISIT (OUTPATIENT)
Dept: PHARMACY | Facility: MEDICAL CENTER | Age: 66
End: 2021-07-20
Payer: MEDICARE

## 2021-08-02 ENCOUNTER — PATIENT MESSAGE (OUTPATIENT)
Dept: MEDICAL GROUP | Facility: MEDICAL CENTER | Age: 66
End: 2021-08-02

## 2021-08-03 ENCOUNTER — PHARMACY VISIT (OUTPATIENT)
Dept: PHARMACY | Facility: MEDICAL CENTER | Age: 66
End: 2021-08-03
Payer: MEDICARE

## 2021-08-03 DIAGNOSIS — K21.9 GASTROESOPHAGEAL REFLUX DISEASE WITHOUT ESOPHAGITIS: ICD-10-CM

## 2021-08-03 PROCEDURE — RXMED WILLOW AMBULATORY MEDICATION CHARGE: Performed by: FAMILY MEDICINE

## 2021-08-03 RX ORDER — OMEPRAZOLE 20 MG/1
20 CAPSULE, DELAYED RELEASE ORAL DAILY
Qty: 30 CAPSULE | Refills: 11 | Status: SHIPPED | OUTPATIENT
Start: 2021-08-03 | End: 2022-09-12 | Stop reason: SDUPTHER

## 2021-08-03 NOTE — TELEPHONE ENCOUNTER
Received request via: Pharmacy    Was the patient seen in the last year in this department? Yes    Does the patient have an active prescription (recently filled or refills available) for medication(s) requested? No     Future Appointments       Provider [facility]    8/31/2021 7:30 AM Michel Triana M.D. [facility]

## 2021-08-06 ENCOUNTER — PHARMACY VISIT (OUTPATIENT)
Dept: PHARMACY | Facility: MEDICAL CENTER | Age: 66
End: 2021-08-06
Payer: MEDICARE

## 2021-08-18 DIAGNOSIS — N30.10 INTERSTITIAL CYSTITIS (CHRONIC) WITHOUT HEMATURIA: ICD-10-CM

## 2021-08-19 ENCOUNTER — PHARMACY VISIT (OUTPATIENT)
Dept: PHARMACY | Facility: MEDICAL CENTER | Age: 66
End: 2021-08-19
Payer: MEDICARE

## 2021-08-19 PROCEDURE — RXMED WILLOW AMBULATORY MEDICATION CHARGE: Performed by: FAMILY MEDICINE

## 2021-08-19 RX ORDER — HYDROMORPHONE HYDROCHLORIDE 4 MG/1
4 TABLET ORAL EVERY 6 HOURS PRN
Qty: 45 TABLET | Refills: 0 | Status: SHIPPED | OUTPATIENT
Start: 2021-08-19 | End: 2021-10-18

## 2021-08-27 PROCEDURE — RXMED WILLOW AMBULATORY MEDICATION CHARGE: Performed by: PAIN MEDICINE

## 2021-08-28 PROCEDURE — RXMED WILLOW AMBULATORY MEDICATION CHARGE: Performed by: FAMILY MEDICINE

## 2021-08-30 ENCOUNTER — PHARMACY VISIT (OUTPATIENT)
Dept: PHARMACY | Facility: MEDICAL CENTER | Age: 66
End: 2021-08-30
Payer: MEDICARE

## 2021-08-30 PROCEDURE — RXMED WILLOW AMBULATORY MEDICATION CHARGE: Performed by: PAIN MEDICINE

## 2021-08-31 ENCOUNTER — OFFICE VISIT (OUTPATIENT)
Dept: MEDICAL GROUP | Facility: MEDICAL CENTER | Age: 66
End: 2021-08-31
Attending: FAMILY MEDICINE
Payer: MEDICARE

## 2021-08-31 ENCOUNTER — PHARMACY VISIT (OUTPATIENT)
Dept: PHARMACY | Facility: MEDICAL CENTER | Age: 66
End: 2021-08-31
Payer: MEDICARE

## 2021-08-31 VITALS
HEART RATE: 80 BPM | SYSTOLIC BLOOD PRESSURE: 130 MMHG | DIASTOLIC BLOOD PRESSURE: 66 MMHG | BODY MASS INDEX: 35.92 KG/M2 | HEIGHT: 68 IN | WEIGHT: 237 LBS | TEMPERATURE: 98.1 F | RESPIRATION RATE: 16 BRPM | OXYGEN SATURATION: 94 %

## 2021-08-31 DIAGNOSIS — E66.09 CLASS 1 OBESITY DUE TO EXCESS CALORIES WITHOUT SERIOUS COMORBIDITY IN ADULT, UNSPECIFIED BMI: ICD-10-CM

## 2021-08-31 DIAGNOSIS — I10 ESSENTIAL HYPERTENSION: ICD-10-CM

## 2021-08-31 DIAGNOSIS — G89.4 CHRONIC PAIN SYNDROME: ICD-10-CM

## 2021-08-31 PROCEDURE — RXMED WILLOW AMBULATORY MEDICATION CHARGE: Performed by: FAMILY MEDICINE

## 2021-08-31 PROCEDURE — 99213 OFFICE O/P EST LOW 20 MIN: CPT | Performed by: FAMILY MEDICINE

## 2021-08-31 PROCEDURE — 99212 OFFICE O/P EST SF 10 MIN: CPT | Performed by: FAMILY MEDICINE

## 2021-08-31 PROCEDURE — RXMED WILLOW AMBULATORY MEDICATION CHARGE: Performed by: PAIN MEDICINE

## 2021-08-31 ASSESSMENT — FIBROSIS 4 INDEX: FIB4 SCORE: 0.86

## 2021-08-31 NOTE — PROGRESS NOTES
"Subjective     Allen Mccabe is a 66 y.o. male who presents with No chief complaint on file.            HPI 1.  Essential hypertension-patient has not recently been checking home blood pressures.  Not reporting any chest pain or palpitations.  He reports ability to walk and exercise is limited by worsening knee pain with even walking short distances around the block.  2.  Chronic pain-patient reports he has been accepted as a patient at [facility] and spine by Dr. Nettles.  They have started him on Suboxone 10 mg weekly patch, designed to replace the hydromorphone ordered for breakthrough pain beyond what he can control with his daily Lizella 10/325.  3.  Obesity-patient was encouraged to further limit portions to achieve gradual weight loss.  ROS negative for ankle edema, lightheadedness, headache           Objective     /66   Pulse 80   Temp 36.7 °C (98.1 °F) (Temporal)   Resp 16   Ht 1.727 m (5' 7.99\")   Wt 108 kg (237 lb)   SpO2 94%   BMI 36.04 kg/m²      Physical Exam      Gen.- alert, cooperative, in no acute distress  Neck- midline trachea, thyroid not enlarged or tender,supple, no cervical adenopathy  Chest-clear to auscultation and percussion with normal breath sounds. No retractions. Chest wall nontender  Cardiac- regular rhythm and rate. No murmur, thrill, or heave                     Assessment & Plan        1. Essential hypertension      2. Chronic pain syndrome    Plan: 1. Recheck BP 6 mo  2. Continue with [facility] (buprenorphin)  3.  Continue diltiazem  mg, along with Dyazide 37.5/25 mg           "

## 2021-09-01 PROCEDURE — RXMED WILLOW AMBULATORY MEDICATION CHARGE: Performed by: OPTOMETRIST

## 2021-09-02 ENCOUNTER — PHARMACY VISIT (OUTPATIENT)
Dept: PHARMACY | Facility: MEDICAL CENTER | Age: 66
End: 2021-09-02
Payer: MEDICARE

## 2021-09-14 NOTE — TELEPHONE ENCOUNTER
Was the patient seen in the last year in this department? Yes     Does patient have an active prescription for medications requested? No     Received Request Via: Pharmacy   Otezla Pregnancy And Lactation Text: This medication is Pregnancy Category C and it isn't known if it is safe during pregnancy. It is unknown if it is excreted in breast milk.

## 2021-09-27 ENCOUNTER — PHARMACY VISIT (OUTPATIENT)
Dept: PHARMACY | Facility: MEDICAL CENTER | Age: 66
End: 2021-09-27
Payer: MEDICARE

## 2021-09-27 PROCEDURE — RXMED WILLOW AMBULATORY MEDICATION CHARGE: Performed by: PAIN MEDICINE

## 2021-09-27 RX ORDER — HYDROCODONE BITARTRATE AND ACETAMINOPHEN 10; 325 MG/1; MG/1
TABLET ORAL
Qty: 140 TABLET | Refills: 0 | OUTPATIENT
Start: 2021-09-27 | End: 2022-05-16

## 2021-10-01 PROCEDURE — RXMED WILLOW AMBULATORY MEDICATION CHARGE: Performed by: OPTOMETRIST

## 2021-10-01 PROCEDURE — RXMED WILLOW AMBULATORY MEDICATION CHARGE: Performed by: FAMILY MEDICINE

## 2021-10-04 ENCOUNTER — PHARMACY VISIT (OUTPATIENT)
Dept: PHARMACY | Facility: MEDICAL CENTER | Age: 66
End: 2021-10-04
Payer: MEDICARE

## 2021-10-25 ENCOUNTER — PHARMACY VISIT (OUTPATIENT)
Dept: PHARMACY | Facility: MEDICAL CENTER | Age: 66
End: 2021-10-25
Payer: MEDICARE

## 2021-10-25 PROCEDURE — RXMED WILLOW AMBULATORY MEDICATION CHARGE: Performed by: PAIN MEDICINE

## 2021-10-25 RX ORDER — HYDROCODONE BITARTRATE AND ACETAMINOPHEN 10; 325 MG/1; MG/1
TABLET ORAL
Qty: 140 TABLET | Refills: 0 | OUTPATIENT
Start: 2021-11-22 | End: 2023-04-07

## 2021-10-25 RX ORDER — HYDROCODONE BITARTRATE AND ACETAMINOPHEN 10; 325 MG/1; MG/1
TABLET ORAL
Qty: 140 TABLET | Refills: 0 | OUTPATIENT
Start: 2021-10-25 | End: 2022-05-16

## 2021-10-31 PROCEDURE — RXMED WILLOW AMBULATORY MEDICATION CHARGE: Performed by: OPTOMETRIST

## 2021-10-31 PROCEDURE — RXMED WILLOW AMBULATORY MEDICATION CHARGE: Performed by: FAMILY MEDICINE

## 2021-11-01 ENCOUNTER — HOSPITAL ENCOUNTER (OUTPATIENT)
Dept: RADIOLOGY | Facility: MEDICAL CENTER | Age: 66
End: 2021-11-01
Attending: PAIN MEDICINE
Payer: MEDICARE

## 2021-11-01 DIAGNOSIS — M54.50 LOW BACK PAIN, UNSPECIFIED BACK PAIN LATERALITY, UNSPECIFIED CHRONICITY, UNSPECIFIED WHETHER SCIATICA PRESENT: ICD-10-CM

## 2021-11-01 PROCEDURE — 72100 X-RAY EXAM L-S SPINE 2/3 VWS: CPT

## 2021-11-01 PROCEDURE — 72148 MRI LUMBAR SPINE W/O DYE: CPT | Mod: MH

## 2021-11-05 ENCOUNTER — PHARMACY VISIT (OUTPATIENT)
Dept: PHARMACY | Facility: MEDICAL CENTER | Age: 66
End: 2021-11-05
Payer: MEDICARE

## 2021-11-05 DIAGNOSIS — L30.9 DERMATITIS: ICD-10-CM

## 2021-11-07 RX ORDER — BETAMETHASONE DIPROPIONATE 0.05 %
OINTMENT (GRAM) TOPICAL
Qty: 45 G | Refills: 0 | Status: SHIPPED | OUTPATIENT
Start: 2021-11-07 | End: 2023-04-07

## 2021-11-08 PROCEDURE — RXMED WILLOW AMBULATORY MEDICATION CHARGE: Performed by: FAMILY MEDICINE

## 2021-11-12 ENCOUNTER — PHARMACY VISIT (OUTPATIENT)
Dept: PHARMACY | Facility: MEDICAL CENTER | Age: 66
End: 2021-11-12
Payer: MEDICARE

## 2021-11-22 ENCOUNTER — PHARMACY VISIT (OUTPATIENT)
Dept: PHARMACY | Facility: MEDICAL CENTER | Age: 66
End: 2021-11-22
Payer: MEDICARE

## 2021-11-22 PROCEDURE — RXMED WILLOW AMBULATORY MEDICATION CHARGE: Performed by: PAIN MEDICINE

## 2021-12-01 PROCEDURE — RXMED WILLOW AMBULATORY MEDICATION CHARGE: Performed by: FAMILY MEDICINE

## 2021-12-01 PROCEDURE — RXMED WILLOW AMBULATORY MEDICATION CHARGE: Performed by: OPTOMETRIST

## 2021-12-03 ENCOUNTER — PHARMACY VISIT (OUTPATIENT)
Dept: PHARMACY | Facility: MEDICAL CENTER | Age: 66
End: 2021-12-03
Payer: MEDICARE

## 2021-12-17 ENCOUNTER — PHARMACY VISIT (OUTPATIENT)
Dept: PHARMACY | Facility: MEDICAL CENTER | Age: 66
End: 2021-12-17
Payer: MEDICARE

## 2021-12-17 PROCEDURE — RXMED WILLOW AMBULATORY MEDICATION CHARGE: Performed by: PAIN MEDICINE

## 2021-12-17 RX ORDER — HYDROCODONE BITARTRATE AND ACETAMINOPHEN 10; 325 MG/1; MG/1
TABLET ORAL
Qty: 140 TABLET | Refills: 0 | OUTPATIENT
Start: 2021-12-17 | End: 2022-05-16

## 2021-12-27 ENCOUNTER — APPOINTMENT (OUTPATIENT)
Dept: PHYSICAL THERAPY | Facility: REHABILITATION | Age: 66
End: 2021-12-27
Attending: PAIN MEDICINE
Payer: MEDICARE

## 2021-12-27 NOTE — OP THERAPY EVALUATION
"  Outpatient Physical Therapy  INITIAL EVALUATION    [facility] [address]  [address]  [place] [address]  Phone:  [phone]  Fax:  [phone]    Date of Evaluation: 12/27/2021    Patient: Allen Mccabe  YOB: 1955  MRN: [id number]     Referring Provider: [address]  [place] [address]   Referring Diagnosis Occipital neuralgia [M54.81];Cervicalgia [M54.2]     Time Calculation                 Chief Complaint: No chief complaint on file.    Visit Diagnoses     ICD-10-CM   1. Occipital neuralgia, unspecified laterality  M54.81   2. Cervicalgia  M54.2       Date of onset of impairment: No data found    Subjective:   History of Present Illness:     Mechanism of injury:    PMH:posterior cervical fusion 2003, HTN      8/31 PCP visit  \"Chronic pain-patient reports he has been accepted as a patient at [facility] and spine by Dr. Nettles.  They have started him on Suboxone 10 mg weekly patch, designed to replace the hydromorphone ordered for breakthrough pain beyond what he can control with his daily Norco 10/325.\"      Past Medical History:   Diagnosis Date   • Dyshidrotic eczema    • Headache(784.0) 1996   • Hypertension    • Interstitial cystitis 2005   • Obesity    • Pain     throat   • Sprain of neck    • Urinary bladder disorder      Past Surgical History:   Procedure Laterality Date   • BASAL CELL EXCISION Left 2/12/2016    Procedure: SQUAMOUS CELL EXCISION EAR AND EXCISION LESION EAR;  Surgeon: Austen Epps M.D.;  Location: SURGERY SURGICAL [facility] ORS;  Service:    • LARYNGOSCOPY  4/1/2015    Performed by Michele Maynard M.D. at SURGERY SAME DAY [facility] ORS   • ESOPHAGOSCOPY  4/1/2015    Performed by Michele Maynard M.D. at SURGERY SAME DAY [facility] ORS   • CERVICAL FUSION POSTERIOR  2003   • APPENDECTOMY     • CARPAL TUNNEL RELEASE      R   • CYSTOSCOPY     • HERNIA REPAIR      bilat   • OTHER      bladder surgery   • " TONSILLECTOMY     • TURP-VAPOR       Social History     Tobacco Use   • Smoking status: Never Smoker   • Smokeless tobacco: Never Used   Substance Use Topics   • Alcohol use: Yes     Alcohol/week: 0.0 oz     Comment: rare     Family and Occupational History     Socioeconomic History   • Marital status: Single     Spouse name: Not on file   • Number of children: Not on file   • Years of education: Not on file   • Highest education level: Not on file   Occupational History   • Not on file       Objective    Exercises/Treatment  Time-based treatments/modalities:           Assessment/Response/Plan    Functional Assessment Used        Referring provider co-signature:  I have reviewed this plan of care and my co-signature certifies the need for services.    Certification Period: 12/27/2021 to  {Future Date:46753}    Physician Signature: ________________________________ Date: ______________

## 2021-12-29 DIAGNOSIS — L30.9 DERMATITIS: ICD-10-CM

## 2021-12-29 PROCEDURE — RXMED WILLOW AMBULATORY MEDICATION CHARGE: Performed by: FAMILY MEDICINE

## 2021-12-29 PROCEDURE — RXMED WILLOW AMBULATORY MEDICATION CHARGE: Performed by: OPTOMETRIST

## 2021-12-30 ENCOUNTER — PHYSICAL THERAPY (OUTPATIENT)
Dept: PHYSICAL THERAPY | Facility: REHABILITATION | Age: 66
End: 2021-12-30
Attending: PAIN MEDICINE
Payer: MEDICARE

## 2021-12-30 DIAGNOSIS — G89.29 NECK PAIN, CHRONIC: ICD-10-CM

## 2021-12-30 DIAGNOSIS — G44.221 CHRONIC TENSION-TYPE HEADACHE, INTRACTABLE: ICD-10-CM

## 2021-12-30 DIAGNOSIS — M54.2 NECK PAIN, CHRONIC: ICD-10-CM

## 2021-12-30 PROCEDURE — 97161 PT EVAL LOW COMPLEX 20 MIN: CPT

## 2021-12-30 PROCEDURE — RXMED WILLOW AMBULATORY MEDICATION CHARGE: Performed by: FAMILY MEDICINE

## 2021-12-30 PROCEDURE — 97110 THERAPEUTIC EXERCISES: CPT

## 2021-12-30 RX ORDER — BETAMETHASONE DIPROPIONATE 0.5 MG/G
1 OINTMENT, AUGMENTED TOPICAL 2 TIMES DAILY
Qty: 50 G | Refills: 0 | Status: SHIPPED | OUTPATIENT
Start: 2021-12-30 | End: 2022-03-05 | Stop reason: SDUPTHER

## 2021-12-30 ASSESSMENT — ENCOUNTER SYMPTOMS
PAIN SCALE AT LOWEST: 2
PAIN SCALE: 2
PAIN SCALE AT HIGHEST: 8
QUALITY: TIGHTNESS
QUALITY: ACHING

## 2021-12-30 NOTE — OP THERAPY EVALUATION
Outpatient Physical Therapy  INITIAL EVALUATION    [facility] Physical Therapy [address]  [address]  [place] [address]  Phone:  [phone]  Fax:  [phone]    Date of Evaluation: 12/30/2021    Patient: Allen Mccabe  YOB: 1955  MRN: [id number]     Referring Provider: NAYAN [address]  [place] [address]   Referring Diagnosis Occipital neuralgia [M54.81];Cervicalgia [M54.2]     Time Calculation  Start time: 0900  Stop time: 0943 Time Calculation (min): 43 minutes         Chief Complaint: Neck Problem and Headache    Visit Diagnoses     ICD-10-CM   1. Neck pain, chronic  M54.2    G89.29   2. Chronic tension-type headache, intractable  G44.221       Date of onset of impairment: 12/30/2016    Subjective:   History of Present Illness:     Mechanism of injury:  Pt referred to PT for chronic neck pain.    Pt reports neck pain comes and goes over the years as well as headaches.     Pain can last hours and sometimes weeks, headaches as well. States the pain does not necessarily stop him from doing anything, is just bothersome.  Hopes to improve pain with PT however does not have high expectations, understand likely won't be pain free.    Apt occasionally had bilateral thumb numbness but has not happened in a few months.    Pt has not noticed anything that causes this pain, no activities diet, etc.    Pt reports slight headache. Last headache was approx a week ago. Headaches can vary in intensity. Described as a tension headache around top of head.  States temples can get sore, states pressure behind eyes -but no vision changes.   No allergies/sinus pressure, no fullness in ear    Neck pain indicated in upper trap/scalenes/SCM left>Rt. Can improve with massage/heat with some temporary relief. Increases with left rotation-limited due to surgery.     Heat, massage, ice can dec intensity.     No falls or changes in gait    Pt has interstitial cystitis  that is chronic, does notice sometimes the headache intensity can change with this.    Pt PMH C spine fusion in  but states this was not beneficial for headaches. Had PT.      Prior level of function:  Sales-products management  Ear problems: ringing and hearing loss  Sleep disturbance:  Not disrupted  Pain:     Current pain ratin    At best pain ratin    At worst pain ratin    Quality:  Aching and tightness      Past Medical History:   Diagnosis Date   • Dyshidrotic eczema    • Headache(784.0)    • Hypertension    • Interstitial cystitis    • Obesity    • Pain     throat   • Sprain of neck    • Urinary bladder disorder      Past Surgical History:   Procedure Laterality Date   • BASAL CELL EXCISION Left 2016    Procedure: SQUAMOUS CELL EXCISION EAR AND EXCISION LESION EAR;  Surgeon: Austen Epps M.D.;  Location: [facility];  Service:    • LARYNGOSCOPY  2015    Performed by Michele Maynard M.D. at SURGERY SAME DAY [facility] ORS   • ESOPHAGOSCOPY  2015    Performed by Michele Maynard M.D. at SURGERY SAME DAY [facility] ORS   • CERVICAL FUSION POSTERIOR     • APPENDECTOMY     • CARPAL TUNNEL RELEASE      R   • CYSTOSCOPY     • HERNIA REPAIR      bilat   • OTHER      bladder surgery   • TONSILLECTOMY     • TURP-VAPOR       Social History     Tobacco Use   • Smoking status: Never Smoker   • Smokeless tobacco: Never Used   Substance Use Topics   • Alcohol use: Yes     Alcohol/week: 0.0 oz     Comment: rare     Family and Occupational History     Socioeconomic History   • Marital status: Single     Spouse name: Not on file   • Number of children: Not on file   • Years of education: Not on file   • Highest education level: Not on file   Occupational History   • Not on file       Objective     Postural Observations  Seated posture: fair    Additional Postural Observation Details  DNF endurance 17.89 seconds    Mod forward head/rounded shoulders    Shoulder Screen     Shoulder active range of motion within functional limits.  Shoulder strength within functional limits.    Neurological Testing     Reflexes   Left   Biceps (C5/C6): normal (2+)    Right   Biceps (C5/C6): normal (2+)    Palpation   Left   Hypertonic in the cervical paraspinals, pectoralis minor, scalenes, sternocleidomastoid and upper trapezius.   Tenderness of the cervical paraspinals, scalenes, sternocleidomastoid and upper trapezius.   Trigger point to sternocleidomastoid and upper trapezius.     Right   Hypertonic in the cervical paraspinals, pectoralis minor, scalenes, sternocleidomastoid and upper trapezius.   Tenderness of the cervical paraspinals, scalenes, sternocleidomastoid and upper trapezius.     Active Range of Motion     Cervical Spine   Flexion: within functional limits  Extension: within functional limits  Left lateral flexion: decreased (pain)  Right lateral flexion: decreased  Left rotation: decreased  Right rotation: decreased (left pain)    Joint Play   Spine     Central PA [place]        C2: hypomobile       C3: hypomobile       C4: hypomobile       C5: hypomobile       C6: hypomobile       C7: hypomobile       C8: hypomobile       T1: hypomobile       T2: hypomobile       T3: hypomobile       T4: hypomobile       T5: hypomobile       T6: hypomobile       T7: hypomobile        Strength:      Left Shoulder   Isolated Muscles   Trapezius (lower): 3+   Trapezius (middle): 3+     Right Shoulder   Isolated Muscles   Trapezius (lower): 4-   Trapezius (middle): 4-     Tests   Cervical spine   Negative cervical spine compression and cervical spine distraction.     Left Shoulder   Negative Spurling's sign.     Right Shoulder   Negative Spurling's sign.         Therapeutic Exercises (CPT 05792):     1. Supine chin tuck, 10 x 5 sec    2. Upper trap stretch, 2 x 1 min B    3. Levator scap stretch, 2 x 1 min B    4. *pt prefers heat/stim      Therapeutic Exercise Summary: HEP: 5 x 1 min upper trap and  levator scap stretch. Supine chin tuck 3 x 10      Time-based treatments/modalities:    Physical Therapy Timed Treatment Charges  Therapeutic exercise minutes (CPT 19384): 10 minutes      Assessment, Response and Plan:   Impairments: lacks appropriate home exercise program, limited mobility and pain with function    Assessment details:  Pt is a 66 year old male who was referred to PT for chronic neck pain and head aches. Through testing likely origin is musculoskeletal secondary to postural impairments resulting in tension headaches and neck pain.. Pt presents with impaired ROM, weakness, impaired flexibility, and pain impacting function. Pt will benefit from skilled intervention 1-2x per week for 8 weeks in order to improve above deficits and return pt to PLOF. Pt provided with HEP handout and educated on importance of compliance for max benefit. Pt also educated on POC, anatomy and physiology of condition, and objective findings. Pt verbalized agreement and understanding.   Other barriers to therapy:  Chronic condition  Prognosis: good    Goals:   Short Term Goals:   Pt will be complaint with HEP 3-5x per week for improving ROM, strength and decreasing pain.      Short term goal time span:  4-6 weeks      Long Term Goals:    Pt will demonstrate improved DNF endurance with 20 second improvement or better from assessment.  Pt will report overall dec headache intensity to moderate from severe and dec frequency to 2x per week.  Pt will demonstrate subjective improvement with NDI % or less.  Long term goal time span:  6-8 weeks    Plan:   Therapy options:  Physical therapy treatment to continue  Planned therapy interventions:  E Stim Unattended (CPT 09256), Hot or Cold Pack Therapy (CPT 16128), Manual Therapy (CPT 19240), Neuromuscular Re-education (CPT 41323), Therapeutic Activities (CPT 35084) and Therapeutic Exercise (CPT 50698)  Frequency:  2x week  Duration in weeks:  8  Discussed with:  Patient      Functional  Assessment Used  Neck Disability Total: 8     Referring provider co-signature:  I have reviewed this plan of care and my co-signature certifies the need for services.    Certification Period: 12/30/2021 to  02/24/22    Physician Signature: ________________________________ Date: ______________

## 2022-01-03 ENCOUNTER — PHARMACY VISIT (OUTPATIENT)
Dept: PHARMACY | Facility: MEDICAL CENTER | Age: 67
End: 2022-01-03
Payer: MEDICARE

## 2022-01-04 ENCOUNTER — PHYSICAL THERAPY (OUTPATIENT)
Dept: PHYSICAL THERAPY | Facility: REHABILITATION | Age: 67
End: 2022-01-04
Attending: PAIN MEDICINE
Payer: MEDICARE

## 2022-01-04 ENCOUNTER — PHARMACY VISIT (OUTPATIENT)
Dept: PHARMACY | Facility: MEDICAL CENTER | Age: 67
End: 2022-01-04
Payer: MEDICARE

## 2022-01-04 DIAGNOSIS — M54.2 NECK PAIN, CHRONIC: ICD-10-CM

## 2022-01-04 DIAGNOSIS — G44.221 CHRONIC TENSION-TYPE HEADACHE, INTRACTABLE: ICD-10-CM

## 2022-01-04 DIAGNOSIS — G89.29 NECK PAIN, CHRONIC: ICD-10-CM

## 2022-01-04 PROCEDURE — 97110 THERAPEUTIC EXERCISES: CPT

## 2022-01-04 NOTE — OP THERAPY DAILY TREATMENT
Outpatient Physical Therapy  DAILY TREATMENT     [facility] Physical [address].  [address]  [place] [address]  Phone:  [phone]  Fax:  [phone]    Date: 01/04/2022    Patient: Allen Mccabe  YOB: 1955  MRN: [id number]     Time Calculation    Start time: 0945  Stop time: 1024 Time Calculation (min): 39 minutes         Chief Complaint: Neck Pain and Headache    Visit #: 2    SUBJECTIVE:  Lost HEP but was attempting some stretches from memory at [facility].    OBJECTIVE:            Therapeutic Exercises (CPT 97203):     1. Foam roll progression, pec stretch 2 min, alt UE flexion 2 min, snow angels 2 min    4. Supine chin tuck, 15 x 10 sec    5. Supine chin tuck c lift, x 5, inc headache-pt want to stop    6. Upper trap stretch, 2 x 1 min B    7. Levator scap stretch, x 30 sec B, x 1 min , cued for 1 min, pt stopped at 26 sec -cues to stay in the stretch    8. Scap retraction, 10 x 10 sec    19. *likes MHP/estim    20. 12/30-2/22      Therapeutic Exercise Summary: HEP: 5 x 1 min upper trap and levator scap stretch. Supine chin tuck 3 x 10  Added scap retraction 1/4       Therapeutic Treatments and Modalities:     1. E Stim Unattended (CPT 97356), IFC x 10 min MHP-no charge cervical spine    Time-based treatments/modalities:    Physical Therapy Timed Treatment Charges  Therapeutic exercise minutes (CPT 69034): 39 minutes          ASSESSMENT:   Re=printed HEP as well as added scap retraction for strengthening. Reviewed with mod cues for technique during sessions. Good internal awareness related to upper trap sub with scap retraction. Educated that inc in symptoms expected however pt still refused to continue with chin tuck/lift.    PLAN/RECOMMENDATIONS:   Plan for treatment: therapy treatment to continue next visit.  Planned interventions for next visit: continue with current treatment.

## 2022-01-06 ENCOUNTER — APPOINTMENT (OUTPATIENT)
Dept: PHYSICAL THERAPY | Facility: REHABILITATION | Age: 67
End: 2022-01-06
Attending: PAIN MEDICINE
Payer: MEDICARE

## 2022-01-10 ENCOUNTER — APPOINTMENT (OUTPATIENT)
Dept: PHYSICAL THERAPY | Facility: REHABILITATION | Age: 67
End: 2022-01-10
Attending: PAIN MEDICINE
Payer: MEDICARE

## 2022-01-12 ENCOUNTER — APPOINTMENT (OUTPATIENT)
Dept: PHYSICAL THERAPY | Facility: REHABILITATION | Age: 67
End: 2022-01-12
Attending: PAIN MEDICINE
Payer: MEDICARE

## 2022-01-13 ENCOUNTER — PHARMACY VISIT (OUTPATIENT)
Dept: PHARMACY | Facility: MEDICAL CENTER | Age: 67
End: 2022-01-13
Payer: MEDICARE

## 2022-01-13 PROCEDURE — RXMED WILLOW AMBULATORY MEDICATION CHARGE: Performed by: PAIN MEDICINE

## 2022-02-02 DIAGNOSIS — I10 ESSENTIAL HYPERTENSION: ICD-10-CM

## 2022-02-02 PROCEDURE — RXMED WILLOW AMBULATORY MEDICATION CHARGE: Performed by: FAMILY MEDICINE

## 2022-02-03 PROCEDURE — RXMED WILLOW AMBULATORY MEDICATION CHARGE: Performed by: FAMILY MEDICINE

## 2022-02-03 RX ORDER — DILTIAZEM HYDROCHLORIDE 300 MG/1
CAPSULE, COATED, EXTENDED RELEASE ORAL
Qty: 30 CAPSULE | Refills: 6 | Status: SHIPPED | OUTPATIENT
Start: 2022-02-03 | End: 2022-09-12 | Stop reason: SDUPTHER

## 2022-02-03 RX ORDER — TERAZOSIN 5 MG/1
CAPSULE ORAL
Qty: 30 CAPSULE | Refills: 6 | Status: SHIPPED | OUTPATIENT
Start: 2022-02-03 | End: 2022-09-12 | Stop reason: SDUPTHER

## 2022-02-03 RX ORDER — TRIAMTERENE AND HYDROCHLOROTHIAZIDE 37.5; 25 MG/1; MG/1
TABLET ORAL
Qty: 30 TABLET | Refills: 6 | Status: SHIPPED | OUTPATIENT
Start: 2022-02-03 | End: 2022-09-12 | Stop reason: SDUPTHER

## 2022-02-03 NOTE — TELEPHONE ENCOUNTER
Received request via: Pharmacy    Was the patient seen in the last year in this department? Yes    Does the patient have an active prescription (recently filled or refills available) for medication(s) requested? No     Last visit 8/31/21

## 2022-02-07 ENCOUNTER — PHARMACY VISIT (OUTPATIENT)
Dept: PHARMACY | Facility: MEDICAL CENTER | Age: 67
End: 2022-02-07
Payer: MEDICARE

## 2022-02-07 ENCOUNTER — OFFICE VISIT (OUTPATIENT)
Dept: MEDICAL GROUP | Facility: MEDICAL CENTER | Age: 67
End: 2022-02-07
Attending: FAMILY MEDICINE
Payer: MEDICARE

## 2022-02-07 VITALS
HEART RATE: 76 BPM | OXYGEN SATURATION: 95 % | RESPIRATION RATE: 16 BRPM | DIASTOLIC BLOOD PRESSURE: 64 MMHG | TEMPERATURE: 97.9 F | SYSTOLIC BLOOD PRESSURE: 122 MMHG | HEIGHT: 68 IN | WEIGHT: 223 LBS | BODY MASS INDEX: 33.8 KG/M2

## 2022-02-07 DIAGNOSIS — I10 ESSENTIAL HYPERTENSION: ICD-10-CM

## 2022-02-07 DIAGNOSIS — N30.10 INTERSTITIAL CYSTITIS (CHRONIC) WITHOUT HEMATURIA: ICD-10-CM

## 2022-02-07 PROCEDURE — 99213 OFFICE O/P EST LOW 20 MIN: CPT | Performed by: FAMILY MEDICINE

## 2022-02-07 ASSESSMENT — FIBROSIS 4 INDEX: FIB4 SCORE: 0.86

## 2022-02-07 NOTE — PROGRESS NOTES
"Subjective     Allen Mccabe is a 66 y.o. male who presents with Hypertension            HPI1.Essential hypertension- pt not checking home readings. Denies chest pain, or palpitations.  2.  Interstitial cystitis-patient reports continued episodes of dysuria along with urinary frequency.  He is being followed at a pain management office with Dr. Nettles, who he is very pleased with.Is using Norco 10/3/2025 5 doses per day.    ROS negative for ankle edema, gross hematuria, fever           Objective     /64   Pulse 76   Temp 36.6 °C (97.9 °F) (Temporal)   Resp 16   Ht 1.727 m (5' 7.99\")   Wt 101 kg (223 lb)   SpO2 95%   BMI 33.92 kg/m²      Physical Exam  Gen.- alert, cooperative, in no acute distress  Neck- midline trachea, thyroid not enlarged or tender,supple, no cervical adenopathy  Chest-clear to auscultation and percussion with normal breath sounds. No retractions. Chest wall nontender  Cardiac- regular rhythm and rate. No murmur, thrill, or heave  Lower extremities-negative for ankle edema, redness, tenderness                      Assessment & Plan        1. Essential hypertension-well-controlled using Dyazide, diltiazem 300 mg, and Terazosin 5 mg      2. Interstitial cystitis (chronic) without hematuria      Plan: 1.  Revisit in 6 months or sooner if needed   2.  Continue limited exercise and portion control to achieve gradual weight loss        "

## 2022-02-10 ENCOUNTER — PHARMACY VISIT (OUTPATIENT)
Dept: PHARMACY | Facility: MEDICAL CENTER | Age: 67
End: 2022-02-10
Payer: MEDICARE

## 2022-02-10 PROCEDURE — RXMED WILLOW AMBULATORY MEDICATION CHARGE: Performed by: PAIN MEDICINE

## 2022-02-10 RX ORDER — GABAPENTIN 300 MG/1
300 CAPSULE ORAL 3 TIMES DAILY PRN
Qty: 90 CAPSULE | Refills: 0 | Status: SHIPPED | OUTPATIENT
Start: 2022-02-10 | End: 2023-04-07

## 2022-02-25 ENCOUNTER — APPOINTMENT (OUTPATIENT)
Dept: PHYSICAL THERAPY | Facility: REHABILITATION | Age: 67
End: 2022-02-25
Attending: PAIN MEDICINE
Payer: MEDICARE

## 2022-03-01 ENCOUNTER — APPOINTMENT (OUTPATIENT)
Dept: PHYSICAL THERAPY | Facility: REHABILITATION | Age: 67
End: 2022-03-01
Attending: PAIN MEDICINE
Payer: MEDICARE

## 2022-03-05 DIAGNOSIS — L30.9 DERMATITIS: ICD-10-CM

## 2022-03-05 PROCEDURE — RXMED WILLOW AMBULATORY MEDICATION CHARGE: Performed by: FAMILY MEDICINE

## 2022-03-07 PROCEDURE — RXMED WILLOW AMBULATORY MEDICATION CHARGE: Performed by: FAMILY MEDICINE

## 2022-03-07 RX ORDER — BETAMETHASONE DIPROPIONATE 0.5 MG/G
1 OINTMENT, AUGMENTED TOPICAL 2 TIMES DAILY
Qty: 50 G | Refills: 0 | Status: SHIPPED | OUTPATIENT
Start: 2022-03-07 | End: 2022-05-10 | Stop reason: SDUPTHER

## 2022-03-08 ENCOUNTER — TELEPHONE (OUTPATIENT)
Dept: PHYSICAL THERAPY | Facility: REHABILITATION | Age: 67
End: 2022-03-08
Payer: MEDICARE

## 2022-03-09 ENCOUNTER — PHARMACY VISIT (OUTPATIENT)
Dept: PHARMACY | Facility: MEDICAL CENTER | Age: 67
End: 2022-03-09
Payer: MEDICARE

## 2022-03-09 PROCEDURE — RXMED WILLOW AMBULATORY MEDICATION CHARGE: Performed by: OPTOMETRIST

## 2022-03-09 PROCEDURE — RXMED WILLOW AMBULATORY MEDICATION CHARGE: Performed by: PAIN MEDICINE

## 2022-03-09 RX ORDER — HYDROCODONE BITARTRATE AND ACETAMINOPHEN 10; 325 MG/1; MG/1
TABLET ORAL
Qty: 90 TABLET | Refills: 0 | OUTPATIENT
Start: 2022-03-09 | End: 2023-04-07

## 2022-04-04 PROCEDURE — RXMED WILLOW AMBULATORY MEDICATION CHARGE: Performed by: FAMILY MEDICINE

## 2022-04-04 PROCEDURE — RXMED WILLOW AMBULATORY MEDICATION CHARGE: Performed by: OPTOMETRIST

## 2022-04-05 ENCOUNTER — PHARMACY VISIT (OUTPATIENT)
Dept: PHARMACY | Facility: MEDICAL CENTER | Age: 67
End: 2022-04-05
Payer: MEDICARE

## 2022-04-06 ENCOUNTER — PHARMACY VISIT (OUTPATIENT)
Dept: PHARMACY | Facility: MEDICAL CENTER | Age: 67
End: 2022-04-06
Payer: MEDICARE

## 2022-04-06 PROCEDURE — RXMED WILLOW AMBULATORY MEDICATION CHARGE: Performed by: PAIN MEDICINE

## 2022-04-19 ENCOUNTER — PATIENT MESSAGE (OUTPATIENT)
Dept: MEDICAL GROUP | Facility: MEDICAL CENTER | Age: 67
End: 2022-04-19
Payer: MEDICARE

## 2022-04-21 ENCOUNTER — PHARMACY VISIT (OUTPATIENT)
Dept: PHARMACY | Facility: MEDICAL CENTER | Age: 67
End: 2022-04-21
Payer: MEDICARE

## 2022-04-21 PROCEDURE — RXMED WILLOW AMBULATORY MEDICATION CHARGE: Performed by: PAIN MEDICINE

## 2022-05-07 DIAGNOSIS — L30.9 DERMATITIS: ICD-10-CM

## 2022-05-08 PROCEDURE — RXMED WILLOW AMBULATORY MEDICATION CHARGE: Performed by: OPTOMETRIST

## 2022-05-08 PROCEDURE — RXMED WILLOW AMBULATORY MEDICATION CHARGE: Performed by: FAMILY MEDICINE

## 2022-05-09 DIAGNOSIS — L30.9 DERMATITIS: ICD-10-CM

## 2022-05-09 RX ORDER — BETAMETHASONE DIPROPIONATE 0.5 MG/G
1 OINTMENT, AUGMENTED TOPICAL 2 TIMES DAILY
Qty: 50 G | Refills: 0 | OUTPATIENT
Start: 2022-05-09

## 2022-05-09 NOTE — TELEPHONE ENCOUNTER
Lab Results   Component Value Date/Time    CHOLSTRLTOT 226 (H) 06/12/2013 09:10 AM    LDL see below 06/12/2013 09:10 AM    HDL 31 (A) 06/12/2013 09:10 AM    TRIGLYCERIDE 493 (H) 06/12/2013 09:10 AM       Lab Results   Component Value Date/Time    SODIUM 143 06/11/2021 06:10 AM    POTASSIUM 3.7 06/11/2021 06:10 AM    CHLORIDE 104 06/11/2021 06:10 AM    CO2 27 06/11/2021 06:10 AM    GLUCOSE 111 (H) 06/11/2021 06:10 AM    BUN 22 06/11/2021 06:10 AM    CREATININE 0.71 06/11/2021 06:10 AM    CREATININE 0.88 05/28/2010 10:50 AM    BUNCREATRAT 16 05/28/2010 10:50 AM    GLOMRATE >59 05/28/2010 10:50 AM     Lab Results   Component Value Date/Time    ALKPHOSPHAT 80 06/11/2021 06:10 AM    ASTSGOT 18 06/11/2021 06:10 AM    ALTSGPT 19 06/11/2021 06:10 AM    TBILIRUBIN 0.5 06/11/2021 06:10 AM

## 2022-05-10 ENCOUNTER — TELEPHONE (OUTPATIENT)
Dept: MEDICAL GROUP | Facility: MEDICAL CENTER | Age: 67
End: 2022-05-10
Payer: MEDICARE

## 2022-05-10 DIAGNOSIS — L30.9 DERMATITIS: ICD-10-CM

## 2022-05-10 PROCEDURE — RXMED WILLOW AMBULATORY MEDICATION CHARGE: Performed by: FAMILY MEDICINE

## 2022-05-10 RX ORDER — BETAMETHASONE DIPROPIONATE 0.5 MG/G
1 OINTMENT, AUGMENTED TOPICAL 2 TIMES DAILY
Qty: 50 G | Refills: 0 | Status: SHIPPED | OUTPATIENT
Start: 2022-05-10 | End: 2022-07-04 | Stop reason: SDUPTHER

## 2022-05-12 ENCOUNTER — PHARMACY VISIT (OUTPATIENT)
Dept: PHARMACY | Facility: MEDICAL CENTER | Age: 67
End: 2022-05-12
Payer: MEDICARE

## 2022-05-14 ENCOUNTER — PATIENT MESSAGE (OUTPATIENT)
Dept: MEDICAL GROUP | Facility: MEDICAL CENTER | Age: 67
End: 2022-05-14
Payer: MEDICARE

## 2022-05-16 ENCOUNTER — OFFICE VISIT (OUTPATIENT)
Dept: MEDICAL GROUP | Facility: MEDICAL CENTER | Age: 67
End: 2022-05-16
Attending: FAMILY MEDICINE
Payer: MEDICARE

## 2022-05-16 ENCOUNTER — PHARMACY VISIT (OUTPATIENT)
Dept: PHARMACY | Facility: MEDICAL CENTER | Age: 67
End: 2022-05-16
Payer: MEDICARE

## 2022-05-16 VITALS
TEMPERATURE: 97.9 F | OXYGEN SATURATION: 97 % | DIASTOLIC BLOOD PRESSURE: 62 MMHG | RESPIRATION RATE: 16 BRPM | SYSTOLIC BLOOD PRESSURE: 122 MMHG | HEIGHT: 68 IN | HEART RATE: 68 BPM | BODY MASS INDEX: 32.81 KG/M2 | WEIGHT: 216.5 LBS

## 2022-05-16 DIAGNOSIS — R42 VERTIGO: ICD-10-CM

## 2022-05-16 DIAGNOSIS — R11.0 NAUSEA: ICD-10-CM

## 2022-05-16 DIAGNOSIS — R26.89 BALANCE PROBLEM: ICD-10-CM

## 2022-05-16 PROCEDURE — RXMED WILLOW AMBULATORY MEDICATION CHARGE: Performed by: FAMILY MEDICINE

## 2022-05-16 PROCEDURE — 99213 OFFICE O/P EST LOW 20 MIN: CPT | Performed by: FAMILY MEDICINE

## 2022-05-16 RX ORDER — MECLIZINE HYDROCHLORIDE 25 MG/1
25 TABLET ORAL 3 TIMES DAILY PRN
Qty: 30 TABLET | Refills: 0 | Status: SHIPPED | OUTPATIENT
Start: 2022-05-16 | End: 2023-04-07

## 2022-05-16 ASSESSMENT — FIBROSIS 4 INDEX: FIB4 SCORE: 0.87

## 2022-05-16 NOTE — PROGRESS NOTES
"Subjective     Allen Mccabe is a 67 y.o. male who presents with Nausea and Loss Of Balance            HPI 1.  Vertigo/nausea-patient reports that for him on  when he awakened to use the bathroom he noticed severe vertigo sensations along with nausea and poor balance.  He had not had any recent trauma or falls.  He felt slightly unsteady on his feet but was able to walk down the middle of the [place] with concentration.  Symptoms persisted till about 1 PM and then abated.  The next night at 4 AM the same thing happened again with the same symptoms.  He noted much stronger case of nausea that day but no actual emesis.  And again symptoms abated about 1 PM.  The next day the  he did not notice any symptoms.  On Saturday morning  he noticed a milder case of vertigo which began when he was extending his neck.  For the most part however he denies symptoms being immediately triggered by sudden head motions.  He has not had any more defined episodes but feels that his head just feels general headache and pressure on a mild level over the past 48 hours.  Patient's sister  of a posterior brain cancer several years ago with some similar symptoms.    ROS negative for allergic head congestion, focal numbness, focal weakness, near syncope, palpitations, dyspnea, chest pain           Objective     /62   Pulse 68   Temp 36.6 °C (97.9 °F) (Temporal)   Resp 16   Ht 1.727 m (5' 7.99\")   Wt 98.2 kg (216 lb 8 oz)   SpO2 97%   BMI 32.93 kg/m²      Physical Exam  Gen.- alert, cooperative, in no acute distress  Neck- midline trachea, thyroid not enlarged or tender,supple, no cervical adenopathy  Chest-clear to auscultation and percussion with normal breath sounds. No retractions. Chest wall nontender  Cardiac- regular rhythm and rate. No murmur, thrill, or heave  Neuro- intact light touch. Intact strength bilaterally. Normal gait. No tremor. Normal speech.  Patient struggles with tandem walk.  Intact " finger-to-nose maneuver.   Ears-normal TMs and canals bilaterally                   Assessment & Plan        1. Vertigo    - meclizine (ANTIVERT) 25 MG Tab; Take 1 Tablet by mouth 3 times a day as needed for Dizziness.  Dispense: 30 Tablet; Refill: 0  - MR-BRAIN-W/O; Future    2. Balance problem    - MR-BRAIN-W/O; Future    3. Nausea    - MR-BRAIN-W/O; Future    Plan: 1.  Trial of meclizine 25 mg every 8 hours for symptom relief  2.  MRI of the brain without contrast to evaluate posterior elements

## 2022-05-20 ENCOUNTER — PHARMACY VISIT (OUTPATIENT)
Dept: PHARMACY | Facility: MEDICAL CENTER | Age: 67
End: 2022-05-20
Payer: MEDICARE

## 2022-05-20 PROCEDURE — RXMED WILLOW AMBULATORY MEDICATION CHARGE: Performed by: PAIN MEDICINE

## 2022-05-20 RX ORDER — HYDROCODONE BITARTRATE AND ACETAMINOPHEN 10; 325 MG/1; MG/1
1 TABLET ORAL
Qty: 140 TABLET | Refills: 0 | Status: CANCELLED | OUTPATIENT
Start: 2022-05-20

## 2022-05-26 ENCOUNTER — HOSPITAL ENCOUNTER (OUTPATIENT)
Dept: RADIOLOGY | Facility: MEDICAL CENTER | Age: 67
End: 2022-05-26
Attending: FAMILY MEDICINE
Payer: MEDICARE

## 2022-05-26 DIAGNOSIS — R42 VERTIGO: ICD-10-CM

## 2022-05-26 DIAGNOSIS — R11.0 NAUSEA: ICD-10-CM

## 2022-05-26 DIAGNOSIS — R26.89 BALANCE PROBLEM: ICD-10-CM

## 2022-05-26 PROCEDURE — 70551 MRI BRAIN STEM W/O DYE: CPT | Mod: MG

## 2022-06-02 ENCOUNTER — PATIENT MESSAGE (OUTPATIENT)
Dept: MEDICAL GROUP | Facility: MEDICAL CENTER | Age: 67
End: 2022-06-02
Payer: MEDICARE

## 2022-06-02 DIAGNOSIS — R42 VERTIGO: ICD-10-CM

## 2022-06-03 ENCOUNTER — PHARMACY VISIT (OUTPATIENT)
Dept: PHARMACY | Facility: MEDICAL CENTER | Age: 67
End: 2022-06-03
Payer: MEDICARE

## 2022-06-03 PROCEDURE — RXMED WILLOW AMBULATORY MEDICATION CHARGE: Performed by: UROLOGY

## 2022-06-03 RX ORDER — GABAPENTIN 300 MG/1
CAPSULE ORAL
Qty: 90 CAPSULE | Refills: 3 | Status: SHIPPED | OUTPATIENT
Start: 2022-06-03 | End: 2023-04-07

## 2022-06-07 ENCOUNTER — PATIENT MESSAGE (OUTPATIENT)
Dept: MEDICAL GROUP | Facility: MEDICAL CENTER | Age: 67
End: 2022-06-07
Payer: MEDICARE

## 2022-06-07 PROCEDURE — RXMED WILLOW AMBULATORY MEDICATION CHARGE: Performed by: FAMILY MEDICINE

## 2022-06-07 PROCEDURE — RXMED WILLOW AMBULATORY MEDICATION CHARGE: Performed by: OPTOMETRIST

## 2022-06-09 ENCOUNTER — PHARMACY VISIT (OUTPATIENT)
Dept: PHARMACY | Facility: MEDICAL CENTER | Age: 67
End: 2022-06-09
Payer: MEDICARE

## 2022-06-17 ENCOUNTER — PHARMACY VISIT (OUTPATIENT)
Dept: PHARMACY | Facility: MEDICAL CENTER | Age: 67
End: 2022-06-17
Payer: MEDICARE

## 2022-06-17 PROCEDURE — RXMED WILLOW AMBULATORY MEDICATION CHARGE: Performed by: PAIN MEDICINE

## 2022-06-27 PROCEDURE — RXMED WILLOW AMBULATORY MEDICATION CHARGE: Performed by: UROLOGY

## 2022-06-28 ENCOUNTER — PHARMACY VISIT (OUTPATIENT)
Dept: PHARMACY | Facility: MEDICAL CENTER | Age: 67
End: 2022-06-28
Payer: MEDICARE

## 2022-07-04 DIAGNOSIS — L30.9 DERMATITIS: ICD-10-CM

## 2022-07-05 PROCEDURE — RXMED WILLOW AMBULATORY MEDICATION CHARGE: Performed by: OPTOMETRIST

## 2022-07-05 PROCEDURE — RXMED WILLOW AMBULATORY MEDICATION CHARGE: Performed by: UROLOGY

## 2022-07-05 PROCEDURE — RXMED WILLOW AMBULATORY MEDICATION CHARGE: Performed by: FAMILY MEDICINE

## 2022-07-05 RX ORDER — BETAMETHASONE DIPROPIONATE 0.5 MG/G
1 OINTMENT, AUGMENTED TOPICAL 2 TIMES DAILY
Qty: 50 G | Refills: 0 | Status: SHIPPED | OUTPATIENT
Start: 2022-07-05 | End: 2022-09-12 | Stop reason: SDUPTHER

## 2022-07-08 ENCOUNTER — PHARMACY VISIT (OUTPATIENT)
Dept: PHARMACY | Facility: MEDICAL CENTER | Age: 67
End: 2022-07-08
Payer: MEDICARE

## 2022-07-09 PROCEDURE — RXMED WILLOW AMBULATORY MEDICATION CHARGE: Performed by: FAMILY MEDICINE

## 2022-07-15 ENCOUNTER — PHARMACY VISIT (OUTPATIENT)
Dept: PHARMACY | Facility: MEDICAL CENTER | Age: 67
End: 2022-07-15
Payer: MEDICARE

## 2022-07-15 PROCEDURE — RXMED WILLOW AMBULATORY MEDICATION CHARGE: Performed by: PAIN MEDICINE

## 2022-07-15 RX ORDER — HYDROCODONE BITARTRATE AND ACETAMINOPHEN 10; 325 MG/1; MG/1
1 TABLET ORAL
Qty: 140 TABLET | Refills: 0 | Status: CANCELLED | OUTPATIENT
Start: 2022-07-15

## 2022-07-18 ENCOUNTER — PATIENT MESSAGE (OUTPATIENT)
Dept: MEDICAL GROUP | Facility: MEDICAL CENTER | Age: 67
End: 2022-07-18
Payer: MEDICARE

## 2022-07-18 DIAGNOSIS — R42 VERTIGO: ICD-10-CM

## 2022-07-19 ENCOUNTER — PHARMACY VISIT (OUTPATIENT)
Dept: PHARMACY | Facility: MEDICAL CENTER | Age: 67
End: 2022-07-19
Payer: MEDICARE

## 2022-07-19 ENCOUNTER — PATIENT MESSAGE (OUTPATIENT)
Dept: MEDICAL GROUP | Facility: MEDICAL CENTER | Age: 67
End: 2022-07-19
Payer: MEDICARE

## 2022-07-19 PROCEDURE — RXMED WILLOW AMBULATORY MEDICATION CHARGE: Performed by: FAMILY MEDICINE

## 2022-07-19 RX ORDER — CEPHALEXIN 500 MG/1
500 CAPSULE ORAL 3 TIMES DAILY
Qty: 21 CAPSULE | Refills: 0 | Status: SHIPPED | OUTPATIENT
Start: 2022-07-19 | End: 2023-04-07

## 2022-08-12 ENCOUNTER — PHARMACY VISIT (OUTPATIENT)
Dept: PHARMACY | Facility: MEDICAL CENTER | Age: 67
End: 2022-08-12
Payer: MEDICARE

## 2022-08-12 PROCEDURE — RXMED WILLOW AMBULATORY MEDICATION CHARGE: Performed by: PAIN MEDICINE

## 2022-08-15 PROCEDURE — RXMED WILLOW AMBULATORY MEDICATION CHARGE: Performed by: FAMILY MEDICINE

## 2022-08-16 ENCOUNTER — PHARMACY VISIT (OUTPATIENT)
Dept: PHARMACY | Facility: MEDICAL CENTER | Age: 67
End: 2022-08-16
Payer: MEDICARE

## 2022-09-08 ENCOUNTER — PHARMACY VISIT (OUTPATIENT)
Dept: PHARMACY | Facility: MEDICAL CENTER | Age: 67
End: 2022-09-08
Payer: MEDICARE

## 2022-09-08 PROCEDURE — RXMED WILLOW AMBULATORY MEDICATION CHARGE: Performed by: PAIN MEDICINE

## 2022-09-12 DIAGNOSIS — L30.9 DERMATITIS: ICD-10-CM

## 2022-09-12 DIAGNOSIS — I10 ESSENTIAL HYPERTENSION: ICD-10-CM

## 2022-09-12 DIAGNOSIS — K21.9 GASTROESOPHAGEAL REFLUX DISEASE WITHOUT ESOPHAGITIS: ICD-10-CM

## 2022-09-13 PROCEDURE — RXMED WILLOW AMBULATORY MEDICATION CHARGE: Performed by: FAMILY MEDICINE

## 2022-09-13 RX ORDER — OMEPRAZOLE 20 MG/1
20 CAPSULE, DELAYED RELEASE ORAL DAILY
Qty: 30 CAPSULE | Refills: 0 | Status: SHIPPED | OUTPATIENT
Start: 2022-09-13 | End: 2023-05-27 | Stop reason: SDUPTHER

## 2022-09-13 RX ORDER — TRIAMTERENE AND HYDROCHLOROTHIAZIDE 37.5; 25 MG/1; MG/1
TABLET ORAL
Qty: 30 TABLET | Refills: 0 | Status: SHIPPED | OUTPATIENT
Start: 2022-09-13 | End: 2023-04-07 | Stop reason: SDUPTHER

## 2022-09-13 RX ORDER — TERAZOSIN 5 MG/1
CAPSULE ORAL
Qty: 30 CAPSULE | Refills: 0 | Status: SHIPPED | OUTPATIENT
Start: 2022-09-13 | End: 2023-04-21 | Stop reason: SDUPTHER

## 2022-09-13 RX ORDER — DILTIAZEM HYDROCHLORIDE 300 MG/1
CAPSULE, COATED, EXTENDED RELEASE ORAL
Qty: 30 CAPSULE | Refills: 0 | Status: SHIPPED | OUTPATIENT
Start: 2022-09-13 | End: 2023-04-21 | Stop reason: SDUPTHER

## 2022-09-13 RX ORDER — BETAMETHASONE DIPROPIONATE 0.5 MG/G
1 OINTMENT, AUGMENTED TOPICAL 2 TIMES DAILY
Qty: 50 G | Refills: 0 | Status: SHIPPED | OUTPATIENT
Start: 2022-09-13 | End: 2023-05-27 | Stop reason: SDUPTHER

## 2022-09-14 ENCOUNTER — PHARMACY VISIT (OUTPATIENT)
Dept: PHARMACY | Facility: MEDICAL CENTER | Age: 67
End: 2022-09-14
Payer: MEDICARE

## 2022-09-14 PROCEDURE — RXMED WILLOW AMBULATORY MEDICATION CHARGE: Performed by: UROLOGY

## 2022-09-16 ENCOUNTER — PHARMACY VISIT (OUTPATIENT)
Dept: PHARMACY | Facility: MEDICAL CENTER | Age: 67
End: 2022-09-16
Payer: MEDICARE

## 2022-10-06 ENCOUNTER — PHARMACY VISIT (OUTPATIENT)
Dept: PHARMACY | Facility: MEDICAL CENTER | Age: 67
End: 2022-10-06
Payer: MEDICARE

## 2022-10-06 PROCEDURE — RXMED WILLOW AMBULATORY MEDICATION CHARGE

## 2022-10-15 PROCEDURE — RXMED WILLOW AMBULATORY MEDICATION CHARGE: Performed by: UROLOGY

## 2022-10-18 ENCOUNTER — PHARMACY VISIT (OUTPATIENT)
Dept: PHARMACY | Facility: MEDICAL CENTER | Age: 67
End: 2022-10-18
Payer: MEDICARE

## 2022-11-03 ENCOUNTER — PHARMACY VISIT (OUTPATIENT)
Dept: PHARMACY | Facility: MEDICAL CENTER | Age: 67
End: 2022-11-03
Payer: MEDICARE

## 2022-11-03 PROCEDURE — RXMED WILLOW AMBULATORY MEDICATION CHARGE

## 2022-11-08 ENCOUNTER — PATIENT MESSAGE (OUTPATIENT)
Dept: HEALTH INFORMATION MANAGEMENT | Facility: OTHER | Age: 67
End: 2022-11-08

## 2022-11-27 NOTE — ASSESSMENT & PLAN NOTE
Group Therapy Note    Date: 11/27/22  Start Time: 0800  End OUNT:[id number]    Number of Participants: 16    Type of Group: SELF INVENTORY    Patient's Goal:  ADL    Notes:  patient participated    Status After Intervention:  Improved    Participation Level:  Active Listener and Interactive    Participation Quality: Appropriate, Attentive, and Sharing    Speech:  pressured    Thought Process/Content: Logical    Affective Functioning: Congruent    Mood: anxious    Level of consciousness:  Alert and Oriented x4    Response to Learning: Progressing to goal    Modes of Intervention: Education and Support    Discipline Responsible: Registered Nurse    Signature: Nicky Aldridge RN Patient reports continued daily low anterior pelvic discomfort. Not reporting any hematuria. He is no longer taking Elmiron due to lack of effect. Denies urinary incontinence

## 2022-12-01 ENCOUNTER — PHARMACY VISIT (OUTPATIENT)
Dept: PHARMACY | Facility: MEDICAL CENTER | Age: 67
End: 2022-12-01
Payer: MEDICARE

## 2022-12-01 PROCEDURE — RXMED WILLOW AMBULATORY MEDICATION CHARGE: Performed by: PAIN MEDICINE

## 2022-12-29 ENCOUNTER — PHARMACY VISIT (OUTPATIENT)
Dept: PHARMACY | Facility: MEDICAL CENTER | Age: 67
End: 2022-12-29
Payer: MEDICARE

## 2022-12-29 PROCEDURE — RXMED WILLOW AMBULATORY MEDICATION CHARGE: Performed by: PAIN MEDICINE

## 2023-01-26 ENCOUNTER — PHARMACY VISIT (OUTPATIENT)
Dept: PHARMACY | Facility: MEDICAL CENTER | Age: 68
End: 2023-01-26
Payer: MEDICARE

## 2023-01-26 PROCEDURE — RXMED WILLOW AMBULATORY MEDICATION CHARGE: Performed by: PAIN MEDICINE

## 2023-02-23 ENCOUNTER — PHARMACY VISIT (OUTPATIENT)
Dept: PHARMACY | Facility: MEDICAL CENTER | Age: 68
End: 2023-02-23
Payer: MEDICARE

## 2023-02-23 PROCEDURE — RXMED WILLOW AMBULATORY MEDICATION CHARGE: Performed by: PAIN MEDICINE

## 2023-03-06 PROCEDURE — RXMED WILLOW AMBULATORY MEDICATION CHARGE: Performed by: OPTOMETRIST

## 2023-03-13 ENCOUNTER — PHARMACY VISIT (OUTPATIENT)
Dept: PHARMACY | Facility: MEDICAL CENTER | Age: 68
End: 2023-03-13
Payer: MEDICARE

## 2023-03-23 ENCOUNTER — PHARMACY VISIT (OUTPATIENT)
Dept: PHARMACY | Facility: MEDICAL CENTER | Age: 68
End: 2023-03-23
Payer: MEDICARE

## 2023-03-23 PROCEDURE — RXMED WILLOW AMBULATORY MEDICATION CHARGE: Performed by: PAIN MEDICINE

## 2023-03-27 ENCOUNTER — HOSPITAL ENCOUNTER (OUTPATIENT)
Dept: RADIOLOGY | Facility: MEDICAL CENTER | Age: 68
End: 2023-03-27
Attending: PAIN MEDICINE
Payer: MEDICARE

## 2023-03-27 DIAGNOSIS — G44.86 CERVICOGENIC HEADACHE: ICD-10-CM

## 2023-03-27 PROCEDURE — 72040 X-RAY EXAM NECK SPINE 2-3 VW: CPT

## 2023-04-07 ENCOUNTER — OFFICE VISIT (OUTPATIENT)
Dept: MEDICAL GROUP | Facility: MEDICAL CENTER | Age: 68
End: 2023-04-07
Attending: FAMILY MEDICINE
Payer: MEDICARE

## 2023-04-07 VITALS
DIASTOLIC BLOOD PRESSURE: 98 MMHG | RESPIRATION RATE: 16 BRPM | HEIGHT: 68 IN | SYSTOLIC BLOOD PRESSURE: 138 MMHG | WEIGHT: 225 LBS | OXYGEN SATURATION: 93 % | HEART RATE: 76 BPM | BODY MASS INDEX: 34.1 KG/M2 | TEMPERATURE: 97.3 F

## 2023-04-07 DIAGNOSIS — L98.9 SKIN LESION: ICD-10-CM

## 2023-04-07 DIAGNOSIS — I10 ESSENTIAL HYPERTENSION: ICD-10-CM

## 2023-04-07 DIAGNOSIS — Z00.00 ROUTINE GENERAL MEDICAL EXAMINATION AT A HEALTH CARE FACILITY: ICD-10-CM

## 2023-04-07 PROCEDURE — 99214 OFFICE O/P EST MOD 30 MIN: CPT | Performed by: FAMILY MEDICINE

## 2023-04-07 PROCEDURE — 99213 OFFICE O/P EST LOW 20 MIN: CPT | Performed by: FAMILY MEDICINE

## 2023-04-07 RX ORDER — TRIAMTERENE AND HYDROCHLOROTHIAZIDE 37.5; 25 MG/1; MG/1
TABLET ORAL
Qty: 90 TABLET | Refills: 1 | Status: SHIPPED | OUTPATIENT
Start: 2023-04-07 | End: 2023-10-09 | Stop reason: SDUPTHER

## 2023-04-07 RX ORDER — HYDROCODONE BITARTRATE AND ACETAMINOPHEN 10; 300 MG/1; MG/1
1 TABLET ORAL
COMMUNITY
End: 2023-04-21

## 2023-04-07 RX ORDER — DILTIAZEM HYDROCHLORIDE 300 MG/1
CAPSULE, EXTENDED RELEASE ORAL
COMMUNITY
End: 2023-04-07

## 2023-04-07 RX ORDER — DILTIAZEM HYDROCHLORIDE 100 MG/1
INJECTION, POWDER, LYOPHILIZED, FOR SOLUTION INTRAVENOUS
COMMUNITY
End: 2023-04-07

## 2023-04-07 ASSESSMENT — PATIENT HEALTH QUESTIONNAIRE - PHQ9: CLINICAL INTERPRETATION OF PHQ2 SCORE: 0

## 2023-04-07 ASSESSMENT — FIBROSIS 4 INDEX: FIB4 SCORE: 0.89

## 2023-04-07 NOTE — PROGRESS NOTES
Subjective:     CC:  No chief complaint on file.      HISTORY OF THE PRESENT ILLNESS: Patient is a 68 y.o. male. This pleasant patient is here today to establish primary care with me and discuss the following issues.   His prior PCP was Michel Triana MD ; last seen 5/6/2022    Specialists   [facility]- Dr. Nettles    He is in need of refills of his BP medications. Reports being out of his medications for past few months after his previous PCP retired.  He is also concerned about mole on the back of his head and is requesting removal/biopsy. He has seen derm in the past for evaluation of skin nodule. States it is most bothersome because it can get nicked when he is getting his haircut/shaved. He feels like lesion has been there for a few years now and has not noticed any significant change in its size or appearance. He continues to manage his pain with opioid therapy through pain specialist office. Denies adverse effect with medication and is able to function significantly better with it. He is prescribed up to 5 tabs daily. He was in PT previously and does home exercises. He is open to exploring other means of non-opioid therapy in addition to his chronic opioid therapy. Reports taking medication appropriately.      Allergies: Nkda [no known drug allergy] and Zofran [dextrose-ondansetron]      [facility] - [address] [address]  Phone: [phone] Fax: [phone]      Current Outpatient Medications   Medication Sig Dispense Refill    triamterene-hctz (MAXZIDE-25/DYAZIDE) 37.5-25 MG Tab TAKE 1 TABLET ORALLY ONCE DAILY 90 Tablet 1    omeprazole (PRILOSEC) 20 MG delayed-release capsule Take 1 Cap by mouth every day. 30 Capsule 0    HYDROcodone-Acetaminophen  MG Tab Take 1 Tablet by mouth. rx'd as 5 times a day as needed for pain. Does not take on daily basis.      DILTIAZem CD (CARDIZEM CD) 300 MG CAPSULE SR 24 HR TAKE 1 CAPSULE ORALLY ONCE DAILY (Patient not taking: Reported on  4/7/2023) 30 Capsule 0    terazosin (HYTRIN) 5 MG Cap TAKE 1 CAPSULE ORALLY ONCE DAILY (Patient not taking: Reported on 4/7/2023) 30 Capsule 0    augmented betamethasone dipropionate (DIPROLENE-AF) 0.05 % ointment Apply 1 Application topically 2 times a day. (Patient not taking: Reported on 4/7/2023) 50 g 0    tizanidine (ZANAFLEX) 4 MG Tab Take 1 tablet by mouth every 6 hours as needed. (Patient not taking: Reported on 4/7/2023) 60 Tab 3     No current facility-administered medications for this visit.       Past Medical History:   Diagnosis Date    Dyshidrotic eczema     Headache(784.0) 1996    Hypertension     Interstitial cystitis 2005    Obesity     Pain     throat    Sprain of neck     Urinary bladder disorder        Past Surgical History:   Procedure Laterality Date    BASAL CELL EXCISION Left 2/12/2016    Procedure: SQUAMOUS CELL EXCISION EAR AND EXCISION LESION EAR;  Surgeon: Austen Epps M.D.;  Location: SURGERY SURG[facility];  Service:     LARYNGOSCOPY  4/1/2015    Performed by Michele Maynard M.D. at SURGERY SAME DAY [facility] ORS    ESOPHAGOSCOPY  4/1/2015    Performed by Michele Maynard M.D. at SURGERY SAME DAY [facility] ORS    CERVICAL FUSION POSTERIOR  2003    APPENDECTOMY      CARPAL TUNNEL RELEASE      R    CYSTOSCOPY      HERNIA REPAIR      bilat    OTHER      bladder surgery    TONSILLECTOMY      TURP-VAPOR         Social History     Tobacco Use    Smoking status: Never    Smokeless tobacco: Never   Substance Use Topics    Alcohol use: Yes     Alcohol/week: 0.0 oz     Comment: rare    Drug use: No     Comment: past drug use       Family History   Problem Relation Age of Onset    Cancer Mother         uterus    Cancer Father         lung    Heart Disease Brother         half brother    Diabetes Neg Hx     Stroke Neg Hx        ROS      Objective:     BP (!) 138/98 (BP Location: Left arm, Patient Position: Sitting, BP Cuff Size: Adult)   Pulse 76   Temp 36.3 °C (97.3 °F) (Temporal)   Resp 16   " Ht 1.727 m (5' 7.99\")   Wt 102 kg (225 lb)   SpO2 93%   BMI 34.22 kg/m²   Physical Exam  HENT:      Head:        Comments: 4mm x 4mm skin colored papule   Constitutional: Alert, no distress  Skin: No rashes in visible areas  Eye: Conjunctiva clear, lids normal  Respiratory: Unlabored respiratory effort, no cough, lungs clear to auscultation bilaterally  CV: RRR  MSK: Normal gait, moves all extremities  Psych: Alert and oriented x3, normal affect and mood      Assessment & Plan:   68 y.o. male with the following -    1. Essential hypertension  Chronic, BP acceptable today off of medications  - Discussed blood pressure goal under 140/90   - Long discussion regarding various treatment considerations for BP management  - Recommend holding off on dilt and terazosin for now in favor of first line therapies  - Encouraged heart healthy diet and at least 150min of aerobic exercise/week  - ASCVD risk % pending lipid;  - Refill triamterene-hctz (MAXZIDE-25/DYAZIDE) 37.5-25 MG Tab; TAKE 1 TABLET ORALLY ONCE DAILY  Dispense: 90 Tablet; Refill: 1  - Advise monitoring BP off of medications with home bp monitor twice daily; resume triamterine-hctz if BP persistently >140/90  - Patient to message with BP readings in 1 week  - ER/Call/Return precautions discussed. He verbalized understanding and agreed with plan.    2. Routine general medical examination at a health care facility  - Lipid Profile; Future  - Comp Metabolic Panel; Future  - HCV Scrn ( [id number] 1xLife); Future  - HEMOGLOBIN A1C; Future    3. Skin lesion  - Acute on chronic issue  - Follow up at next appointment to determine appropriateness of in office skin biopsy with partners in office.    Other orders  - HYDROcodone-Acetaminophen  MG Tab; Take 1 Tablet by mouth. rx'd as 5 times a day as needed for pain. Does not take on daily basis.       Return in about 4 weeks (around 2023) for routine annual wellness exam, lab follow up.    Please note that this " dictation was created using voice recognition software. I have made every reasonable attempt to correct obvious errors, but I expect that there are errors of grammar and possibly content that I did not discover before finalizing the note.

## 2023-04-10 PROCEDURE — RXMED WILLOW AMBULATORY MEDICATION CHARGE: Performed by: FAMILY MEDICINE

## 2023-04-14 ENCOUNTER — PHARMACY VISIT (OUTPATIENT)
Dept: PHARMACY | Facility: MEDICAL CENTER | Age: 68
End: 2023-04-14
Payer: MEDICARE

## 2023-04-18 RX ORDER — HYDROCODONE BITARTRATE AND ACETAMINOPHEN 10; 325 MG/1; MG/1
1 TABLET ORAL
Qty: 140 TABLET | Refills: 0 | Status: CANCELLED | OUTPATIENT
Start: 2023-04-27 | End: 2023-05-25

## 2023-04-20 ENCOUNTER — PHARMACY VISIT (OUTPATIENT)
Dept: PHARMACY | Facility: MEDICAL CENTER | Age: 68
End: 2023-04-20
Payer: MEDICARE

## 2023-04-20 PROCEDURE — RXMED WILLOW AMBULATORY MEDICATION CHARGE: Performed by: PAIN MEDICINE

## 2023-04-21 ENCOUNTER — PHARMACY VISIT (OUTPATIENT)
Dept: PHARMACY | Facility: MEDICAL CENTER | Age: 68
End: 2023-04-21
Payer: MEDICARE

## 2023-04-21 ENCOUNTER — OFFICE VISIT (OUTPATIENT)
Dept: MEDICAL GROUP | Facility: MEDICAL CENTER | Age: 68
End: 2023-04-21
Attending: FAMILY MEDICINE
Payer: MEDICARE

## 2023-04-21 VITALS
WEIGHT: 224 LBS | OXYGEN SATURATION: 97 % | TEMPERATURE: 97.5 F | HEIGHT: 68 IN | RESPIRATION RATE: 16 BRPM | HEART RATE: 74 BPM | BODY MASS INDEX: 33.95 KG/M2 | DIASTOLIC BLOOD PRESSURE: 110 MMHG | SYSTOLIC BLOOD PRESSURE: 160 MMHG

## 2023-04-21 DIAGNOSIS — Z00.00 ENCOUNTER FOR ANNUAL WELLNESS VISIT (AWV) IN MEDICARE PATIENT: ICD-10-CM

## 2023-04-21 DIAGNOSIS — Z00.00 MEDICARE ANNUAL WELLNESS VISIT, INITIAL: ICD-10-CM

## 2023-04-21 DIAGNOSIS — E66.9 OBESITY (BMI 30-39.9): ICD-10-CM

## 2023-04-21 DIAGNOSIS — I10 ESSENTIAL HYPERTENSION: ICD-10-CM

## 2023-04-21 DIAGNOSIS — L98.9 SKIN LESION: ICD-10-CM

## 2023-04-21 PROCEDURE — 99213 OFFICE O/P EST LOW 20 MIN: CPT | Performed by: FAMILY MEDICINE

## 2023-04-21 PROCEDURE — RXMED WILLOW AMBULATORY MEDICATION CHARGE: Performed by: FAMILY MEDICINE

## 2023-04-21 PROCEDURE — G0438 PPPS, INITIAL VISIT: HCPCS | Performed by: FAMILY MEDICINE

## 2023-04-21 RX ORDER — TERAZOSIN 5 MG/1
CAPSULE ORAL
Qty: 90 CAPSULE | Refills: 3 | Status: SHIPPED | OUTPATIENT
Start: 2023-04-21 | End: 2023-05-22 | Stop reason: SINTOL

## 2023-04-21 RX ORDER — DILTIAZEM HYDROCHLORIDE 300 MG/1
CAPSULE, COATED, EXTENDED RELEASE ORAL
Qty: 90 CAPSULE | Refills: 3 | Status: SHIPPED | OUTPATIENT
Start: 2023-04-21

## 2023-04-21 ASSESSMENT — ACTIVITIES OF DAILY LIVING (ADL): BATHING_REQUIRES_ASSISTANCE: 0

## 2023-04-21 ASSESSMENT — FIBROSIS 4 INDEX: FIB4 SCORE: 0.89

## 2023-04-21 ASSESSMENT — ENCOUNTER SYMPTOMS: GENERAL WELL-BEING: GOOD

## 2023-04-21 ASSESSMENT — PATIENT HEALTH QUESTIONNAIRE - PHQ9: CLINICAL INTERPRETATION OF PHQ2 SCORE: 0

## 2023-04-21 NOTE — PATIENT INSTRUCTIONS
"DASH Eating Plan  DASH stands for \"Dietary Approaches to Stop Hypertension.\" The DASH eating plan is a healthy eating plan that has been shown to reduce high blood pressure (hypertension). It may also reduce your risk for type 2 diabetes, heart disease, and stroke. The DASH eating plan may also help with weight loss.  What are tips for following this plan?    General guidelines  Avoid eating more than 2,300 mg (milligrams) of salt (sodium) a day. If you have hypertension, you may need to reduce your sodium intake to 1,500 mg a day.  Limit alcohol intake to no more than 1 drink a day for nonpregnant women and 2 drinks a day for men. One drink equals 12 oz of beer, 5 oz of wine, or 1½ oz of hard liquor.  Work with your health care provider to maintain a healthy body weight or to lose weight. Ask what an ideal weight is for you.  Get at least 30 minutes of exercise that causes your heart to beat faster (aerobic exercise) most days of the week. Activities may include walking, swimming, or biking.  Work with your health care provider or diet and nutrition specialist (dietitian) to adjust your eating plan to your individual calorie needs.  Reading food labels    Check food labels for the amount of sodium per serving. Choose foods with less than 5 percent of the Daily Value of sodium. Generally, foods with less than 300 mg of sodium per serving fit into this eating plan.  To find whole grains, look for the word \"whole\" as the first word in the ingredient list.  Shopping  Buy products labeled as \"low-sodium\" or \"no salt added.\"  Buy fresh foods. Avoid canned foods and premade or frozen meals.  Cooking  Avoid adding salt when cooking. Use salt-free seasonings or herbs instead of table salt or sea salt. Check with your health care provider or pharmacist before using salt substitutes.  Do not lundberg foods. Cook foods using healthy methods such as baking, boiling, grilling, and broiling instead.  Cook with heart-healthy oils, such " as olive, canola, soybean, or sunflower oil.  Meal planning  Eat a balanced diet that includes:  5 or more servings of fruits and vegetables each day. At each meal, try to fill half of your plate with fruits and vegetables.  Up to 6-8 servings of whole grains each day.  Less than 6 oz of lean meat, poultry, or fish each day. A 3-oz serving of meat is about the same size as a deck of cards. One egg equals 1 oz.  2 servings of low-fat dairy each day.  A serving of nuts, seeds, or beans 5 times each week.  Heart-healthy fats. Healthy fats called Omega-3 fatty acids are found in foods such as flaxseeds and coldwater fish, like sardines, salmon, and mackerel.  Limit how much you eat of the following:  Canned or prepackaged foods.  Food that is high in trans fat, such as fried foods.  Food that is high in saturated fat, such as fatty meat.  Sweets, desserts, sugary drinks, and other foods with added sugar.  Full-fat dairy products.  Do not salt foods before eating.  Try to eat at least 2 vegetarian meals each week.  Eat more home-cooked food and less restaurant, buffet, and fast food.  When eating at a restaurant, ask that your food be prepared with less salt or no salt, if possible.  What foods are recommended?  The items listed may not be a complete list. Talk with your dietitian about what dietary choices are best for you.  Grains  Whole-grain or whole-wheat bread. Whole-grain or whole-wheat pasta. Brown rice. Oatmeal. Quinoa. Bulgur. Whole-grain and low-sodium cereals. Nieves bread. Low-fat, low-sodium crackers. Whole-wheat flour tortillas.  Vegetables  Fresh or frozen vegetables (raw, steamed, roasted, or grilled). Low-sodium or reduced-sodium tomato and vegetable juice. Low-sodium or reduced-sodium tomato sauce and tomato paste. Low-sodium or reduced-sodium canned vegetables.  Fruits  All fresh, dried, or frozen fruit. Canned fruit in natural juice (without added sugar).  Meat and other protein foods  Skinless chicken  or turkey. Ground chicken or turkey. Pork with fat trimmed off. Fish and seafood. Egg whites. Dried beans, peas, or lentils. Unsalted nuts, nut butters, and seeds. Unsalted canned beans. Lean cuts of beef with fat trimmed off. Low-sodium, lean deli meat.  Dairy  Low-fat (1%) or fat-free (skim) milk. Fat-free, low-fat, or reduced-fat cheeses. Nonfat, low-sodium ricotta or cottage cheese. Low-fat or nonfat yogurt. Low-fat, low-sodium cheese.  Fats and oils  Soft margarine without trans fats. Vegetable oil. Low-fat, reduced-fat, or light mayonnaise and salad dressings (reduced-sodium). Canola, safflower, olive, soybean, and sunflower oils. Avocado.  Seasoning and other foods  Herbs. Spices. Seasoning mixes without salt. Unsalted popcorn and pretzels. Fat-free sweets.  What foods are not recommended?  The items listed may not be a complete list. Talk with your dietitian about what dietary choices are best for you.  Grains  Baked goods made with fat, such as croissants, muffins, or some breads. Dry pasta or rice meal packs.  Vegetables  Creamed or fried vegetables. Vegetables in a cheese sauce. Regular canned vegetables (not low-sodium or reduced-sodium). Regular canned tomato sauce and paste (not low-sodium or reduced-sodium). Regular tomato and vegetable juice (not low-sodium or reduced-sodium). Pickles. Olives.  Fruits  Canned fruit in a light or heavy syrup. Fried fruit. Fruit in cream or butter sauce.  Meat and other protein foods  Fatty cuts of meat. Ribs. Fried meat. Jansen. Sausage. Bologna and other processed lunch meats. Salami. Fatback. Hotdogs. Bratwurst. Salted nuts and seeds. Canned beans with added salt. Canned or smoked fish. Whole eggs or egg yolks. Chicken or turkey with skin.  Dairy  Whole or 2% milk, cream, and half-and-half. Whole or full-fat cream cheese. Whole-fat or sweetened yogurt. Full-fat cheese. Nondairy creamers. Whipped toppings. Processed cheese and cheese spreads.  Fats and oils  Butter.  Stick margarine. Lard. Shortening. Ghee. Jansen fat. Tropical oils, such as coconut, palm kernel, or palm oil.  Seasoning and other foods  Salted popcorn and pretzels. Onion salt, garlic salt, seasoned salt, table salt, and sea salt. Worcestershire sauce. Tartar sauce. Barbecue sauce. Teriyaki sauce. Soy sauce, including reduced-sodium. Steak sauce. Canned and packaged gravies. Fish sauce. Oyster sauce. Cocktail sauce. Horseradish that you find on the shelf. Ketchup. Mustard. Meat flavorings and tenderizers. Bouillon cubes. Hot sauce and Tabasco sauce. Premade or packaged marinades. Premade or packaged taco seasonings. Relishes. Regular salad dressings.  Where to find more information:  National Heart, Lung, and Blood [place]: www.nhlbi.nih.gov  American Heart Association: www.heart.org  Summary  The DASH eating plan is a healthy eating plan that has been shown to reduce high blood pressure (hypertension). It may also reduce your risk for type 2 diabetes, heart disease, and stroke.  With the DASH eating plan, you should limit salt (sodium) intake to 2,300 mg a day. If you have hypertension, you may need to reduce your sodium intake to 1,500 mg a day.  When on the DASH eating plan, aim to eat more fresh fruits and vegetables, whole grains, lean proteins, low-fat dairy, and heart-healthy fats.  Work with your health care provider or diet and nutrition specialist (dietitian) to adjust your eating plan to your individual calorie needs.  This information is not intended to replace advice given to you by your health care provider. Make sure you discuss any questions you have with your health care provider.  Document Released: 12/06/2012 Document Revised: 11/30/2018 Document Reviewed: 12/11/2017  Elsevier Patient Education © 2020 [employer] Inc.

## 2023-04-21 NOTE — PROGRESS NOTES
HPI:  Allen Mccabe is a 68 y.o. here for Medicare Annual Wellness Visit     Patient Active Problem List    Diagnosis Date Noted    Chronic low back pain without sciatica 04/10/2018    Left anterior knee pain 08/22/2017    Obesity (BMI 30-39.9) 10/03/2016    Dermatitis 12/24/2015    Lumbar muscle pain 10/27/2015    Drug dermatitis 05/18/2015    Esophageal reflux 04/01/2015    Hypertension 06/29/2010    Hyperlipidemia 06/11/2010    Chronic pain syndrome 06/15/2009       Current Outpatient Medications   Medication Sig Dispense Refill    terazosin (HYTRIN) 5 MG Cap TAKE 1 CAPSULE ORALLY ONCE DAILY 90 Capsule 3    dilTIAZem CD (CARDIZEM CD) 300 MG CAPSULE SR 24 HR TAKE 1 CAPSULE ORALLY ONCE DAILY 90 Capsule 3    HYDROcodone/acetaminophen (NORCO)  MG Tab Take 1 Tablet by mouth five times a day as needed for 28 days 140 Tablet 0    triamterene-hctz (MAXZIDE-25/DYAZIDE) 37.5-25 MG Tab TAKE 1 TABLET ORALLY ONCE DAILY 90 Tablet 1    omeprazole (PRILOSEC) 20 MG delayed-release capsule Take 1 Cap by mouth every day. 30 Capsule 0    augmented betamethasone dipropionate (DIPROLENE-AF) 0.05 % ointment Apply 1 Application topically 2 times a day. 50 g 0    tizanidine (ZANAFLEX) 4 MG Tab Take 1 tablet by mouth every 6 hours as needed. 60 Tab 3     No current facility-administered medications for this visit.            Current supplements as per medication list.     Allergies: Nkda [no known drug allergy] and Zofran [dextrose-ondansetron]    Current social contact/activities: Recently got a new puppy (Karena, baker retriever); has been taking it to training to school.    He  reports that he has never smoked. He has never used smokeless tobacco. He reports current alcohol use. He reports that he does not use drugs.  Counseling given: Not Answered      ROS:    Gait: Uses no assistive device   Ostomy: No   Other tubes: No   Amputations: No   Chronic oxygen use: No  Last eye exam: March 2023  Wears hearing aids: No   :  Denies any urinary leakage during the last 6 months    Screening:  Depression Screening  Little interest or pleasure in doing things?  0 - not at all  Feeling down, depressed , or hopeless? 0 - not at all  Patient Health Questionnaire Score: 0     If depressive symptoms identified deferred to follow up visit unless specifically addressed in assessment and plan.    Interpretation of PHQ-9 Total Score   Score Severity   1-4 No Depression   5-9 Mild Depression   10-14 Moderate Depression   15-19 Moderately Severe Depression   20-27 Severe Depression    Screening for Cognitive Impairment  Three Minute Recall (daughter, heaven, mountain) 3/3    Kirby clock face with all 12 numbers and set the hands to show 10 past 11.  Yes    Cognitive concerns identified deferred for follow up unless specifically addressed in assessment and plan.    Fall Risk Assessment  Has the patient had two or more falls in the last year or any fall with injury in the last year?  Yes    Safety Assessment  Throw rugs on floor.  No  Handrails on all stairs.  Yes  Good lighting in all hallways.  Yes  Difficulty hearing.  No  Patient counseled about all safety risks that were identified.    Functional Assessment ADLs  Are there any barriers preventing you from cooking for yourself or meeting nutritional needs?  No.    Are there any barriers preventing you from driving safely or obtaining transportation?  No.    Are there any barriers preventing you from using a telephone or calling for help?  No.    Are there any barriers preventing you from shopping?  No.    Are there any barriers preventing you from taking care of your own finances?  No.    Are there any barriers preventing you from managing your medications?  No.    Are there any barriers preventing you from showering, bathing or dressing yourself?  No.    Are you currently engaging in any exercise or physical activity?  No.     What is your perception of your health?  Good    Advance Care  Planning  Do you have an Advance Directive, Living Will, Durable Power of , or POLST? Yes  Advance Directive Living Will     is not on file - instructed patient to bring in a copy to scan into their chart      Health Maintenance Summary            Overdue - COVID-19 Vaccine (1) Overdue - never done      No completion history exists for this topic.              Overdue - IMM ZOSTER VACCINES (1 of 2) Overdue - never done      No completion history exists for this topic.              Overdue - IMM PNEUMOCOCCAL VACCINE: 65+ Years (1 - PCV) Overdue - never done      No completion history exists for this topic.              IMM INFLUENZA (Season Ended) Next due on 9/1/2023      No completion history exists for this topic.              Annual Wellness Visit (Every 366 Days) Next due on 4/21/2024 04/21/2023  Visit Dx: Medicare annual wellness visit, initial              COLORECTAL CANCER SCREENING (COLONOSCOPY - Every 10 Years) Next due on 1/5/2025 01/05/2015  AMB REFERRAL TO GI FOR COLONOSCOPY              IMM DTaP/Tdap/Td Vaccine (2 - Td or Tdap) Next due on 2/17/2025 02/17/2015  Imm Admin: Tdap Vaccine    02/16/1994  Imm Admin: TD Vaccine              HEPATITIS C SCREENING  Completed      04/18/2023  HEP C VIRUS ANTIBODY    05/15/2015  HEPATITIS PANEL ACUTE(4 COMPONENTS)              IMM HEP B VACCINE (Series Information) Aged Out      No completion history exists for this topic.              IMM MENINGOCOCCAL ACWY VACCINE (Series Information) Aged Out      No completion history exists for this topic.                    Patient Care Team:  Leslee Young M.D. as PCP - General (Family Medicine)  Ross Nettles M.D. (Physical Medicine & Rehabilitation Pain Medicine)        Social History     Tobacco Use    Smoking status: Never    Smokeless tobacco: Never   Substance Use Topics    Alcohol use: Yes     Alcohol/week: 0.0 oz     Comment: rare    Drug use: No     Comment: past drug use     Family  "History   Problem Relation Age of Onset    Cancer Mother         uterus    Cancer Father         lung    Heart Disease Brother         half brother    Diabetes Neg Hx     Stroke Neg Hx      He  has a past medical history of Dyshidrotic eczema, Headache(784.0) (1996), Hypertension, Interstitial cystitis (2005), Obesity, Pain, Sprain of neck, and Urinary bladder disorder.   Past Surgical History:   Procedure Laterality Date    BASAL CELL EXCISION Left 2/12/2016    Procedure: SQUAMOUS CELL EXCISION EAR AND EXCISION LESION EAR;  Surgeon: Austen Epps M.D.;  Location: [facility];  Service:     LARYNGOSCOPY  4/1/2015    Performed by Michele Maynard M.D. at SUR[facility]ERY SAME DAY [facility]    ESOPHAGOSCOPY  4/1/2015    Performed by Michele Maynard M.D. at SURGERY SAME DAY [facility]    CERVICAL FUSION POSTERIOR  2003    APPENDECTOMY      CARPAL TUNNEL RELEASE      R    CYSTOSCOPY      HERNIA REPAIR      bilat    OTHER      bladder surgery    TONSILLECTOMY      TURP-VAPOR         Exam:   BP (!) 160/110 (BP Location: Left arm, Patient Position: Sitting, BP Cuff Size: Adult)   Pulse 74   Temp 36.4 °C (97.5 °F) (Temporal)   Resp 16   Ht 1.727 m (5' 7.99\")   Wt 102 kg (224 lb)   SpO2 97%  Body mass index is 34.07 kg/m².    Hearing good.    Dentition fair  Alert, oriented in no acute distress.  Eye contact is good, speech goal directed, affect calm    Assessment and Plan. The following treatment and monitoring plan is recommended:    1. Encounter for annual wellness visit (AWV) in Medicare patient    Services suggested: No services needed at this time  Health Care Screening: Age-appropriate preventive services Medicare covers discussed today and ordered if indicated.    Referrals offered: Community-based lifestyle interventions to reduce health risks and promote self-management and wellness, fall prevention, nutrition, physical activity, tobacco-use cessation, weight loss, and mental health services as per " orders if indicated.    Discussion today about general wellness and lifestyle habits:    Prevent falls and reduce trip hazards; Cautioned about securing or removing rugs.  Have a working fire alarm and carbon monoxide detector;   Engage in regular physical activity and social activities     2. Essential hypertension  - terazosin (HYTRIN) 5 MG Cap; TAKE 1 CAPSULE ORALLY ONCE DAILY  Dispense: 90 Capsule; Refill: 3  - dilTIAZem CD (CARDIZEM CD) 300 MG CAPSULE SR 24 HR; TAKE 1 CAPSULE ORALLY ONCE DAILY  Dispense: 90 Capsule; Refill: 3    3. Skin lesion  - Referral to Plastic Surgery    4. Obesity (BMI 30-39.9)  - Referral to Pharmacotherapy Service    Follow-up: Return in about 4 weeks (around 5/19/2023) for blood pressure check.

## 2023-05-16 ENCOUNTER — HOSPITAL ENCOUNTER (OUTPATIENT)
Dept: LAB | Facility: MEDICAL CENTER | Age: 68
End: 2023-05-16
Attending: FAMILY MEDICINE
Payer: MEDICARE

## 2023-05-16 DIAGNOSIS — Z00.00 ROUTINE GENERAL MEDICAL EXAMINATION AT A HEALTH CARE FACILITY: ICD-10-CM

## 2023-05-16 PROCEDURE — RXMED WILLOW AMBULATORY MEDICATION CHARGE: Performed by: NURSE PRACTITIONER

## 2023-05-18 ENCOUNTER — PHARMACY VISIT (OUTPATIENT)
Dept: PHARMACY | Facility: MEDICAL CENTER | Age: 68
End: 2023-05-18
Payer: MEDICARE

## 2023-05-22 ENCOUNTER — OFFICE VISIT (OUTPATIENT)
Dept: MEDICAL GROUP | Facility: MEDICAL CENTER | Age: 68
End: 2023-05-22
Attending: FAMILY MEDICINE
Payer: MEDICARE

## 2023-05-22 VITALS
TEMPERATURE: 97.9 F | DIASTOLIC BLOOD PRESSURE: 76 MMHG | WEIGHT: 227 LBS | RESPIRATION RATE: 16 BRPM | SYSTOLIC BLOOD PRESSURE: 136 MMHG | HEIGHT: 68 IN | OXYGEN SATURATION: 95 % | HEART RATE: 68 BPM | BODY MASS INDEX: 34.4 KG/M2

## 2023-05-22 DIAGNOSIS — I10 PRIMARY HYPERTENSION: ICD-10-CM

## 2023-05-22 DIAGNOSIS — E78.2 MIXED HYPERLIPIDEMIA: ICD-10-CM

## 2023-05-22 PROCEDURE — 99213 OFFICE O/P EST LOW 20 MIN: CPT | Performed by: FAMILY MEDICINE

## 2023-05-22 PROCEDURE — 99214 OFFICE O/P EST MOD 30 MIN: CPT | Performed by: FAMILY MEDICINE

## 2023-05-22 PROCEDURE — 3078F DIAST BP <80 MM HG: CPT | Performed by: FAMILY MEDICINE

## 2023-05-22 PROCEDURE — 3075F SYST BP GE 130 - 139MM HG: CPT | Performed by: FAMILY MEDICINE

## 2023-05-22 PROCEDURE — RXMED WILLOW AMBULATORY MEDICATION CHARGE: Performed by: FAMILY MEDICINE

## 2023-05-22 RX ORDER — ATORVASTATIN CALCIUM 20 MG/1
20 TABLET, FILM COATED ORAL NIGHTLY
Qty: 90 TABLET | Refills: 1 | Status: SHIPPED | OUTPATIENT
Start: 2023-05-22 | End: 2023-11-16 | Stop reason: SDUPTHER

## 2023-05-22 ASSESSMENT — FIBROSIS 4 INDEX: FIB4 SCORE: 0.89

## 2023-05-22 NOTE — PROGRESS NOTES
"Subjective   Chief Complaint   Patient presents with    Follow-Up        HPI:   Allen presents today with    Hypertension  Current regiment HCTZ and cardizem. Previously was taking terazosin as well. Since last visit stopped taking the hytrin because it was causing severe dry mouth.     He has been taking home BP readings, uses wrist cuff and readings have been consistently 30 points higher than office readings done on same day.     Hyperlipidemia  Patient has never been on cholesterol lowering medication. Reports exercising more regularly since he's had new dog, Karena, in his life. Able to tolerate more physical activity when she is around.        Objective   Social History     Tobacco Use    Smoking status: Never    Smokeless tobacco: Never   Substance Use Topics    Alcohol use: Yes     Alcohol/week: 0.0 oz     Comment: rare    Drug use: No     Comment: past drug use       Exam:  /76   Pulse 68   Temp 36.6 °C (97.9 °F)   Resp 16   Ht 1.727 m (5' 7.99\")   Wt 103 kg (227 lb)   SpO2 95%   BMI 34.52 kg/m²     Physical Exam  Constitutional: Alert, no distress  Psych: Alert and oriented x3, normal affect and mood    Allergies   Allergen Reactions    Nkda [No Known Drug Allergy]     Zofran [Dextrose-Ondansetron] Rash     Diffuse rash       [facility] - [address] [address]  Phone: [phone] Fax: [phone]    Current Outpatient Medications   Medication Sig Dispense Refill    atorvastatin (LIPITOR) 20 MG Tab Take 1 Tablet by mouth every evening. 90 Tablet 1    Lidocaine, Anorectal, 5 % Cream 1 Gram Four Times A Day as needed for pain 120 g 1    [START ON 6/12/2023] HYDROcodone/acetaminophen (NORCO)  MG Tab Take 1 tablet by mouth 5 times a day as needed for pain 140 Tablet 0    HYDROcodone/acetaminophen (NORCO)  MG Tab Take 1 tablet by mouth 5 times a day as needed for pain 140 Tablet 0    dilTIAZem CD (CARDIZEM CD) 300 MG CAPSULE SR 24 HR TAKE 1 CAPSULE ORALLY ONCE DAILY " 90 Capsule 3    triamterene-hctz (MAXZIDE-25/DYAZIDE) 37.5-25 MG Tab TAKE 1 TABLET ORALLY ONCE DAILY 90 Tablet 1    omeprazole (PRILOSEC) 20 MG delayed-release capsule Take 1 Cap by mouth every day. 30 Capsule 0    augmented betamethasone dipropionate (DIPROLENE-AF) 0.05 % ointment Apply 1 Application topically 2 times a day. 50 g 0    tizanidine (ZANAFLEX) 4 MG Tab Take 1 tablet by mouth every 6 hours as needed. 60 Tab 3     No current facility-administered medications for this visit.       Assessment & Plan    68 y.o. male with the following -     1. Primary hypertension  Chronic, controlled.  Blood pressure is at goal under 140/90 in the office today.  We will continue the current regimen with Cardizem and triamterene/HCTZ. Continue heart healthy diet and at least 150min of aerobic exercise/week. Encourage patient to get arm cuff for more accurate readings as he has been using wrist cuff to monitor home BP which often are unreliable.  - Reassess in 6-12 months    2. Mixed hyperlipidemia  - Acute on chronic; reviewed recent labs from April with patient which were all within normal limits except for lipi panel  ASCVD risk based on recent labs from April:16.9% risk of cardiovascular event (coronary or stroke death or non-fatal MI or stroke) in next 10 years.  10.4%10-year cardiovascular risk if risk factors were improved  - Encourage patient to continue heart healthy diet and regular exercise to help improve overall CV health; after shared decision making will start   Moderate dose statin given elevated risk.  - Recheck labs in 3 months to determine response to therapy  - Lipid Profile; Future  - Comp Metabolic Panel; Future  - atorvastatin (LIPITOR) 20 MG Tab; Take 1 Tablet by mouth every evening.  Dispense: 90 Tablet; Refill: 1      Return in about 3 months (around 8/22/2023).    Please note that this dictation was created using voice recognition software. I have made every reasonable attempt to correct obvious  errors, but I expect that there are errors of grammar and possibly content that I did not discover before finalizing the note.

## 2023-05-22 NOTE — ASSESSMENT & PLAN NOTE
Patient has never been on cholesterol lowering medication. Reports exercising more regularly since he's had new dog, Karena, in his life. Able to tolerate more physical activity when she is around.

## 2023-05-22 NOTE — ASSESSMENT & PLAN NOTE
Current regiment HCTZ and cardizem. Previously was taking terazosin as well. Since last visit stopped taking the hytrin because it was causing severe dry mouth.     He has been taking home BP readings, uses wrist cuff and readings have been consistently 30 points higher than office readings done on same day.

## 2023-05-23 ENCOUNTER — PHARMACY VISIT (OUTPATIENT)
Dept: PHARMACY | Facility: MEDICAL CENTER | Age: 68
End: 2023-05-23
Payer: MEDICARE

## 2023-05-27 DIAGNOSIS — L30.9 DERMATITIS: ICD-10-CM

## 2023-05-27 DIAGNOSIS — K21.9 GASTROESOPHAGEAL REFLUX DISEASE WITHOUT ESOPHAGITIS: ICD-10-CM

## 2023-05-30 PROCEDURE — RXMED WILLOW AMBULATORY MEDICATION CHARGE: Performed by: FAMILY MEDICINE

## 2023-05-30 RX ORDER — OMEPRAZOLE 20 MG/1
20 CAPSULE, DELAYED RELEASE ORAL DAILY
Qty: 90 CAPSULE | Refills: 3 | Status: SHIPPED | OUTPATIENT
Start: 2023-05-30

## 2023-05-30 RX ORDER — BETAMETHASONE DIPROPIONATE 0.5 MG/G
1 OINTMENT, AUGMENTED TOPICAL 2 TIMES DAILY
Qty: 50 G | Refills: 0 | Status: SHIPPED | OUTPATIENT
Start: 2023-05-30 | End: 2023-09-18 | Stop reason: SDUPTHER

## 2023-06-02 ENCOUNTER — PHARMACY VISIT (OUTPATIENT)
Dept: PHARMACY | Facility: MEDICAL CENTER | Age: 68
End: 2023-06-02
Payer: MEDICARE

## 2023-06-14 PROCEDURE — RXMED WILLOW AMBULATORY MEDICATION CHARGE: Performed by: NURSE PRACTITIONER

## 2023-06-15 ENCOUNTER — PHARMACY VISIT (OUTPATIENT)
Dept: PHARMACY | Facility: MEDICAL CENTER | Age: 68
End: 2023-06-15
Payer: MEDICARE

## 2023-07-06 PROCEDURE — RXMED WILLOW AMBULATORY MEDICATION CHARGE: Performed by: FAMILY MEDICINE

## 2023-07-07 PROCEDURE — RXMED WILLOW AMBULATORY MEDICATION CHARGE: Performed by: OPTOMETRIST

## 2023-07-13 PROCEDURE — RXMED WILLOW AMBULATORY MEDICATION CHARGE: Performed by: PAIN MEDICINE

## 2023-07-14 ENCOUNTER — PHARMACY VISIT (OUTPATIENT)
Dept: PHARMACY | Facility: MEDICAL CENTER | Age: 68
End: 2023-07-14
Payer: MEDICARE

## 2023-07-17 ENCOUNTER — PHARMACY VISIT (OUTPATIENT)
Dept: PHARMACY | Facility: MEDICAL CENTER | Age: 68
End: 2023-07-17
Payer: MEDICARE

## 2023-08-07 PROCEDURE — RXMED WILLOW AMBULATORY MEDICATION CHARGE: Performed by: PAIN MEDICINE

## 2023-08-09 ENCOUNTER — PHARMACY VISIT (OUTPATIENT)
Dept: PHARMACY | Facility: MEDICAL CENTER | Age: 68
End: 2023-08-09
Payer: MEDICARE

## 2023-08-11 ENCOUNTER — HOSPITAL ENCOUNTER (OUTPATIENT)
Facility: MEDICAL CENTER | Age: 68
End: 2023-08-11
Attending: PLASTIC SURGERY
Payer: MEDICARE

## 2023-08-11 LAB — PATHOLOGY CONSULT NOTE: NORMAL

## 2023-08-11 PROCEDURE — 88305 TISSUE EXAM BY PATHOLOGIST: CPT

## 2023-08-14 ENCOUNTER — HOSPITAL ENCOUNTER (OUTPATIENT)
Dept: LAB | Facility: MEDICAL CENTER | Age: 68
End: 2023-08-14
Attending: FAMILY MEDICINE
Payer: MEDICARE

## 2023-08-14 DIAGNOSIS — E78.2 MIXED HYPERLIPIDEMIA: ICD-10-CM

## 2023-08-14 LAB
ALBUMIN SERPL BCP-MCNC: 4.6 G/DL (ref 3.2–4.9)
ALBUMIN/GLOB SERPL: 1.8 G/DL
ALP SERPL-CCNC: 92 U/L (ref 30–99)
ALT SERPL-CCNC: 25 U/L (ref 2–50)
ANION GAP SERPL CALC-SCNC: 11 MMOL/L (ref 7–16)
AST SERPL-CCNC: 25 U/L (ref 12–45)
BILIRUB SERPL-MCNC: 0.5 MG/DL (ref 0.1–1.5)
BUN SERPL-MCNC: 18 MG/DL (ref 8–22)
CALCIUM ALBUM COR SERPL-MCNC: 9 MG/DL (ref 8.5–10.5)
CALCIUM SERPL-MCNC: 9.5 MG/DL (ref 8.5–10.5)
CHLORIDE SERPL-SCNC: 103 MMOL/L (ref 96–112)
CHOLEST SERPL-MCNC: 147 MG/DL (ref 100–199)
CO2 SERPL-SCNC: 28 MMOL/L (ref 20–33)
CREAT SERPL-MCNC: 0.79 MG/DL (ref 0.5–1.4)
FASTING STATUS PATIENT QL REPORTED: NORMAL
GFR SERPLBLD CREATININE-BSD FMLA CKD-EPI: 97 ML/MIN/1.73 M 2
GLOBULIN SER CALC-MCNC: 2.6 G/DL (ref 1.9–3.5)
GLUCOSE SERPL-MCNC: 125 MG/DL (ref 65–99)
HDLC SERPL-MCNC: 52 MG/DL
LDLC SERPL CALC-MCNC: 70 MG/DL
POTASSIUM SERPL-SCNC: 4.4 MMOL/L (ref 3.6–5.5)
PROT SERPL-MCNC: 7.2 G/DL (ref 6–8.2)
SODIUM SERPL-SCNC: 142 MMOL/L (ref 135–145)
TRIGL SERPL-MCNC: 126 MG/DL (ref 0–149)

## 2023-08-14 PROCEDURE — 36415 COLL VENOUS BLD VENIPUNCTURE: CPT

## 2023-08-14 PROCEDURE — 80061 LIPID PANEL: CPT

## 2023-08-14 PROCEDURE — 80053 COMPREHEN METABOLIC PANEL: CPT

## 2023-08-16 PROCEDURE — RXMED WILLOW AMBULATORY MEDICATION CHARGE: Performed by: OPTOMETRIST

## 2023-08-16 PROCEDURE — RXMED WILLOW AMBULATORY MEDICATION CHARGE: Performed by: FAMILY MEDICINE

## 2023-08-21 ENCOUNTER — OFFICE VISIT (OUTPATIENT)
Dept: MEDICAL GROUP | Facility: MEDICAL CENTER | Age: 68
End: 2023-08-21
Attending: FAMILY MEDICINE
Payer: MEDICARE

## 2023-08-21 ENCOUNTER — PHARMACY VISIT (OUTPATIENT)
Dept: PHARMACY | Facility: MEDICAL CENTER | Age: 68
End: 2023-08-21
Payer: MEDICARE

## 2023-08-21 VITALS
DIASTOLIC BLOOD PRESSURE: 76 MMHG | TEMPERATURE: 97.9 F | OXYGEN SATURATION: 93 % | HEART RATE: 60 BPM | SYSTOLIC BLOOD PRESSURE: 138 MMHG | RESPIRATION RATE: 16 BRPM | WEIGHT: 229 LBS | HEIGHT: 68 IN | BODY MASS INDEX: 34.71 KG/M2

## 2023-08-21 DIAGNOSIS — E66.9 OBESITY (BMI 30-39.9): ICD-10-CM

## 2023-08-21 DIAGNOSIS — I10 PRIMARY HYPERTENSION: ICD-10-CM

## 2023-08-21 DIAGNOSIS — R73.9 HYPERGLYCEMIA: ICD-10-CM

## 2023-08-21 DIAGNOSIS — E78.2 MIXED HYPERLIPIDEMIA: ICD-10-CM

## 2023-08-21 PROCEDURE — 3078F DIAST BP <80 MM HG: CPT | Performed by: FAMILY MEDICINE

## 2023-08-21 PROCEDURE — 99212 OFFICE O/P EST SF 10 MIN: CPT | Performed by: FAMILY MEDICINE

## 2023-08-21 PROCEDURE — 3075F SYST BP GE 130 - 139MM HG: CPT | Performed by: FAMILY MEDICINE

## 2023-08-21 PROCEDURE — RXMED WILLOW AMBULATORY MEDICATION CHARGE: Performed by: PAIN MEDICINE

## 2023-08-21 PROCEDURE — 99214 OFFICE O/P EST MOD 30 MIN: CPT | Performed by: FAMILY MEDICINE

## 2023-08-21 RX ORDER — GABAPENTIN 300 MG/1
300 CAPSULE ORAL 2 TIMES DAILY
Qty: 60 CAPSULE | Refills: 0 | Status: CANCELLED | OUTPATIENT
Start: 2023-08-16

## 2023-08-21 RX ORDER — GABAPENTIN 300 MG/1
CAPSULE ORAL
Qty: 60 CAPSULE | Refills: 0 | OUTPATIENT
Start: 2023-08-21

## 2023-08-21 NOTE — ASSESSMENT & PLAN NOTE
Reports compliance with hctz-triamterene and cardizem; previously was on terazosin as well but was having significant dry mouth so remains off of it.

## 2023-08-21 NOTE — ASSESSMENT & PLAN NOTE
Reports compliance with atorvastatin daily. Usually takes it in the afternoon and this has helped with compliance as he was forgetting to take it other times of day. Denies any adverse effects with medication.

## 2023-08-21 NOTE — PROGRESS NOTES
"Subjective   Chief Complaint   Patient presents with    Hypertension        HPI:   Allen presents today with    Hyperlipidemia  Reports compliance with atorvastatin daily. Usually takes it in the afternoon and this has helped with compliance as he was forgetting to take it other times of day. Denies any adverse effects with medication.    Hypertension  Reports compliance with hctz-triamterene and cardizem; previously was on terazosin as well but was having significant dry mouth so remains off of it.    Obesity (BMI 30-39.9)  Patient has been making more dietary changes and has lost about 7 lbs in past month as he was up to 237 lbs previously. He is interested in learning more about fasting type diets. He has never seen a nutritionist before.      Objective   Social History     Tobacco Use    Smoking status: Never    Smokeless tobacco: Never   Substance Use Topics    Alcohol use: Yes     Alcohol/week: 0.0 oz     Comment: rare    Drug use: No     Comment: past drug use       Exam:  /76   Pulse 60   Temp 36.6 °C (97.9 °F)   Resp 16   Ht 1.727 m (5' 7.99\")   Wt 104 kg (229 lb)   SpO2 93%   BMI 34.83 kg/m²     Physical Exam  Constitutional: Alert, no distress  Skin: No rashes in visible areas  Eye: Conjunctiva clear, lids normal  Respiratory: Unlabored respiratory effort, no cough  MSK: Normal gait, moves all extremities  Psych: Alert and oriented x3, normal affect and mood    Allergies   Allergen Reactions    Nkda [No Known Drug Allergy]     Zofran [Dextrose-Ondansetron] Rash     Diffuse rash       [facility] [address] [address]  Phone: [phone] Fax: [phone]    Current Outpatient Medications   Medication Sig Dispense Refill    erythromycin 5 MG/GM Ointment Apply a small amount to both eyes once a day at bed time. 3.5 g 4    gabapentin (NEURONTIN) 300 MG Cap Take 1 Capsule by mouth Twice A Day 60 Capsule 0    oxyCODONE-acetaminophen (PERCOCET-10)  MG Tab Take 1/2 to 1 " tablet by mouth three times a day as needed 90 Tablet 0    gabapentin (NEURONTIN) 300 MG Cap Take 1 Capsule by mouth 2 times a day. 60 Capsule 0    HYDROcodone/acetaminophen (NORCO)  MG Tab Take 1 tablet by mouth five times a day as needed for 28 days. 140 Tablet 0    omeprazole (PRILOSEC) 20 MG delayed-release capsule Take 1 capsule by mouth every day. 90 Capsule 3    augmented betamethasone dipropionate (DIPROLENE-AF) 0.05 % ointment Apply 1 Application topically 2 times a day. 50 g 0    atorvastatin (LIPITOR) 20 MG Tab Take 1 Tablet by mouth every evening. 90 Tablet 1    Lidocaine, Anorectal, 5 % Cream 1 Gram Four Times A Day as needed for pain 120 g 1    HYDROcodone/acetaminophen (NORCO)  MG Tab Take 1 tablet by mouth 5 times a day as needed for pain 140 Tablet 0    HYDROcodone/acetaminophen (NORCO)  MG Tab Take 1 tablet by mouth 5 times a day as needed for pain 140 Tablet 0    dilTIAZem CD (CARDIZEM CD) 300 MG CAPSULE SR 24 HR TAKE 1 CAPSULE ORALLY ONCE DAILY 90 Capsule 3    triamterene-hctz (MAXZIDE-25/DYAZIDE) 37.5-25 MG Tab TAKE 1 TABLET ORALLY ONCE DAILY 90 Tablet 1    tizanidine (ZANAFLEX) 4 MG Tab Take 1 tablet by mouth every 6 hours as needed. 60 Tab 3     No current facility-administered medications for this visit.     Hospital Outpatient Visit on 08/14/2023   Component Date Value Ref Range Status    Sodium 08/14/2023 142  135 - 145 mmol/L Final    Potassium 08/14/2023 4.4  3.6 - 5.5 mmol/L Final    Chloride 08/14/2023 103  96 - 112 mmol/L Final    Co2 08/14/2023 28  20 - 33 mmol/L Final    Anion Gap 08/14/2023 11.0  7.0 - 16.0 Final    Glucose 08/14/2023 125 (H)  65 - 99 mg/dL Final    Bun 08/14/2023 18  8 - 22 mg/dL Final    Creatinine 08/14/2023 0.79  0.50 - 1.40 mg/dL Final    Calcium 08/14/2023 9.5  8.5 - 10.5 mg/dL Final    Correct Calcium 08/14/2023 9.0  8.5 - 10.5 mg/dL Final    AST(SGOT) 08/14/2023 25  12 - 45 U/L Final    ALT(SGPT) 08/14/2023 25  2 - 50 U/L Final    Alkaline  Phosphatase 2023 92  30 - 99 U/L Final    Total Bilirubin 2023 0.5  0.1 - 1.5 mg/dL Final    Albumin 2023 4.6  3.2 - 4.9 g/dL Final    Total Protein 2023 7.2  6.0 - 8.2 g/dL Final    Globulin 2023 2.6  1.9 - 3.5 g/dL Final    A-G Ratio 2023 1.8  g/dL Final    Cholesterol,Tot 2023 147  100 - 199 mg/dL Final    Triglycerides 2023 126  0 - 149 mg/dL Final    HDL 2023 52  >=40 mg/dL Final    LDL 2023 70  <100 mg/dL Final    Fasting Status 2023 Fasting   Final    GFR (CKD-EPI) 2023 97  >60 mL/min/1.73 m 2 Final    Comment: Estimated Glomerular Filtration Rate is calculated using  race neutral CKD-EPI  equation per NKF-ASN recommendations.           Assessment & Plan    68 y.o. male with the following -     1. Mixed hyperlipidemia  - Chronic; much improved. Recommend continue with daily statin.  - Encouraged heart healthy diet and exercise; resources and recommendations shared via secure messaging.    2. Obesity (BMI 30-39.9)  Long discussion with patient about treatment strategies and goals.    - Goal of 10% weight loss over 6 months can  cardiovascular risk by up to 25%.  - Discussed importance of healthy diet, particularly whole food, plant based and use of intermittent fasting to help with weight loss.    - Discussed importance of regular exercise (5x/week, 20-30 minutes of sustained cardiovascular training)  - Additional resources and recommendations sent via secure messaging  - Weight loss medication can be considered if no progress evident following lifestyle measures as above for at least 3-6 months   -  Discussed indications for bariatric surgery options, does not currently qualify.  - Pt verbalized understanding and acknowledged treatment plan.  - Referral to Nutrition Services    3. Hyperglycemia  - Recommend screening due to potential side effect of statin use with increasing risk for T2DM and elevated glucose levels on previous  tests  - HEMOGLOBIN A1C; Future    4. Primary hypertension  -Chronic, controlled.  Blood pressure is at goal under 140/90 in the office today.  We will continue the current regimen. Continue heart healthy diet and at least 150min of aerobic exercise/week  - Reassess in 6-12 months      Return in about 6 months (around 2/21/2024).    Please note that this dictation was created using voice recognition software. I have made every reasonable attempt to correct obvious errors, but I expect that there are errors of grammar and possibly content that I did not discover before finalizing the note.

## 2023-08-21 NOTE — ASSESSMENT & PLAN NOTE
Patient has been making more dietary changes and has lost about 7 lbs in past month as he was up to 237 lbs previously. He is interested in learning more about fasting type diets. He has never seen a nutritionist before.

## 2023-08-22 ENCOUNTER — PHARMACY VISIT (OUTPATIENT)
Dept: PHARMACY | Facility: MEDICAL CENTER | Age: 68
End: 2023-08-22
Payer: MEDICARE

## 2023-09-06 ENCOUNTER — PHARMACY VISIT (OUTPATIENT)
Dept: PHARMACY | Facility: MEDICAL CENTER | Age: 68
End: 2023-09-06
Payer: MEDICARE

## 2023-09-06 PROCEDURE — RXMED WILLOW AMBULATORY MEDICATION CHARGE: Performed by: PAIN MEDICINE

## 2023-09-08 ENCOUNTER — HOSPITAL ENCOUNTER (OUTPATIENT)
Dept: LAB | Facility: MEDICAL CENTER | Age: 68
End: 2023-09-08
Attending: FAMILY MEDICINE
Payer: MEDICARE

## 2023-09-08 DIAGNOSIS — R73.9 HYPERGLYCEMIA: ICD-10-CM

## 2023-09-08 LAB
EST. AVERAGE GLUCOSE BLD GHB EST-MCNC: 126 MG/DL
HBA1C MFR BLD: 6 % (ref 4–5.6)

## 2023-09-08 PROCEDURE — 83036 HEMOGLOBIN GLYCOSYLATED A1C: CPT | Mod: GA

## 2023-09-08 PROCEDURE — 36415 COLL VENOUS BLD VENIPUNCTURE: CPT | Mod: GA

## 2023-09-13 DIAGNOSIS — L30.9 DERMATITIS: ICD-10-CM

## 2023-09-13 PROCEDURE — RXMED WILLOW AMBULATORY MEDICATION CHARGE: Performed by: FAMILY MEDICINE

## 2023-09-13 PROCEDURE — RXMED WILLOW AMBULATORY MEDICATION CHARGE: Performed by: NURSE PRACTITIONER

## 2023-09-13 RX ORDER — BETAMETHASONE DIPROPIONATE 0.5 MG/G
1 OINTMENT, AUGMENTED TOPICAL 2 TIMES DAILY
Qty: 50 G | Refills: 0 | Status: CANCELLED | OUTPATIENT
Start: 2023-09-13

## 2023-09-18 ENCOUNTER — PHARMACY VISIT (OUTPATIENT)
Dept: PHARMACY | Facility: MEDICAL CENTER | Age: 68
End: 2023-09-18
Payer: MEDICARE

## 2023-09-18 DIAGNOSIS — L30.9 DERMATITIS: ICD-10-CM

## 2023-09-19 PROCEDURE — RXMED WILLOW AMBULATORY MEDICATION CHARGE

## 2023-09-19 RX ORDER — BETAMETHASONE DIPROPIONATE 0.5 MG/G
1 OINTMENT, AUGMENTED TOPICAL 2 TIMES DAILY
Qty: 50 G | Refills: 0 | Status: SHIPPED | OUTPATIENT
Start: 2023-09-19 | End: 2023-12-20 | Stop reason: SDUPTHER

## 2023-09-19 NOTE — TELEPHONE ENCOUNTER
Received request via: Pharmacy    Was the patient seen in the last year in this department? Yes    Does the patient have an active prescription (recently filled or refills available) for medication(s) requested? No    Does the patient have [facility] and need 100 day supply (blood pressure, diabetes and cholesterol meds only)? Patient does not have SCP

## 2023-09-25 ENCOUNTER — PHARMACY VISIT (OUTPATIENT)
Dept: PHARMACY | Facility: MEDICAL CENTER | Age: 68
End: 2023-09-25
Payer: MEDICARE

## 2023-10-02 ENCOUNTER — PHARMACY VISIT (OUTPATIENT)
Dept: PHARMACY | Facility: MEDICAL CENTER | Age: 68
End: 2023-10-02
Payer: MEDICARE

## 2023-10-02 PROCEDURE — RXMED WILLOW AMBULATORY MEDICATION CHARGE: Performed by: OPTOMETRIST

## 2023-10-02 PROCEDURE — RXMED WILLOW AMBULATORY MEDICATION CHARGE: Performed by: NURSE PRACTITIONER

## 2023-10-09 DIAGNOSIS — I10 ESSENTIAL HYPERTENSION: ICD-10-CM

## 2023-10-09 PROCEDURE — RXMED WILLOW AMBULATORY MEDICATION CHARGE: Performed by: FAMILY MEDICINE

## 2023-10-10 RX ORDER — TRIAMTERENE AND HYDROCHLOROTHIAZIDE 37.5; 25 MG/1; MG/1
TABLET ORAL
Qty: 90 TABLET | Refills: 1 | Status: SHIPPED | OUTPATIENT
Start: 2023-10-10 | End: 2024-01-10 | Stop reason: SDUPTHER

## 2023-10-10 NOTE — TELEPHONE ENCOUNTER
Received request via: Pharmacy    Was the patient seen in the last year in this department? Yes    Does the patient have an active prescription (recently filled or refills available) for medication(s) requested? No    Does the patient have [facility] and need 100 day supply (blood pressure, diabetes and cholesterol meds only)? Patient does not have SCP

## 2023-10-11 PROCEDURE — RXMED WILLOW AMBULATORY MEDICATION CHARGE: Performed by: FAMILY MEDICINE

## 2023-10-13 ENCOUNTER — PHARMACY VISIT (OUTPATIENT)
Dept: PHARMACY | Facility: MEDICAL CENTER | Age: 68
End: 2023-10-13
Payer: MEDICARE

## 2023-10-19 PROCEDURE — RXMED WILLOW AMBULATORY MEDICATION CHARGE: Performed by: NURSE PRACTITIONER

## 2023-10-20 ENCOUNTER — PHARMACY VISIT (OUTPATIENT)
Dept: PHARMACY | Facility: MEDICAL CENTER | Age: 68
End: 2023-10-20
Payer: MEDICARE

## 2023-10-30 ENCOUNTER — PHARMACY VISIT (OUTPATIENT)
Dept: PHARMACY | Facility: MEDICAL CENTER | Age: 68
End: 2023-10-30
Payer: MEDICARE

## 2023-10-30 PROCEDURE — RXMED WILLOW AMBULATORY MEDICATION CHARGE: Performed by: PAIN MEDICINE

## 2023-10-31 ENCOUNTER — PHARMACY VISIT (OUTPATIENT)
Dept: PHARMACY | Facility: MEDICAL CENTER | Age: 68
End: 2023-10-31
Payer: MEDICARE

## 2023-10-31 PROCEDURE — RXMED WILLOW AMBULATORY MEDICATION CHARGE: Performed by: NURSE PRACTITIONER

## 2023-10-31 RX ORDER — AMITRIPTYLINE HYDROCHLORIDE 10 MG/1
TABLET, FILM COATED ORAL
Qty: 30 TABLET | Refills: 0 | Status: SHIPPED | OUTPATIENT
Start: 2023-10-31 | End: 2024-01-19

## 2023-11-16 DIAGNOSIS — E78.2 MIXED HYPERLIPIDEMIA: ICD-10-CM

## 2023-11-16 PROCEDURE — RXMED WILLOW AMBULATORY MEDICATION CHARGE: Performed by: FAMILY MEDICINE

## 2023-11-16 RX ORDER — ATORVASTATIN CALCIUM 20 MG/1
20 TABLET, FILM COATED ORAL NIGHTLY
Qty: 90 TABLET | Refills: 1 | Status: SHIPPED | OUTPATIENT
Start: 2023-11-16

## 2023-11-17 ENCOUNTER — PHARMACY VISIT (OUTPATIENT)
Dept: PHARMACY | Facility: MEDICAL CENTER | Age: 68
End: 2023-11-17
Payer: MEDICARE

## 2023-11-27 ENCOUNTER — PHARMACY VISIT (OUTPATIENT)
Dept: PHARMACY | Facility: MEDICAL CENTER | Age: 68
End: 2023-11-27
Payer: MEDICARE

## 2023-11-27 PROCEDURE — RXMED WILLOW AMBULATORY MEDICATION CHARGE: Performed by: NURSE PRACTITIONER

## 2023-12-20 ENCOUNTER — PATIENT MESSAGE (OUTPATIENT)
Dept: MEDICAL GROUP | Facility: MEDICAL CENTER | Age: 68
End: 2023-12-20
Payer: MEDICARE

## 2023-12-20 DIAGNOSIS — M54.2 NECK PAIN OF OVER 3 MONTHS DURATION: ICD-10-CM

## 2023-12-20 DIAGNOSIS — S62.92XA CLOSED FRACTURE OF LEFT HAND, INITIAL ENCOUNTER: ICD-10-CM

## 2023-12-20 DIAGNOSIS — L30.9 DERMATITIS: ICD-10-CM

## 2023-12-20 PROCEDURE — RXMED WILLOW AMBULATORY MEDICATION CHARGE

## 2023-12-20 RX ORDER — BETAMETHASONE DIPROPIONATE 0.5 MG/G
1 OINTMENT, AUGMENTED TOPICAL 2 TIMES DAILY
Qty: 50 G | Refills: 0 | Status: SHIPPED | OUTPATIENT
Start: 2023-12-20 | End: 2024-03-20 | Stop reason: SDUPTHER

## 2023-12-20 RX ORDER — TIZANIDINE 4 MG/1
4 TABLET ORAL EVERY 6 HOURS PRN
Qty: 60 TABLET | Refills: 3 | Status: SHIPPED | OUTPATIENT
Start: 2023-12-20

## 2023-12-21 PROCEDURE — RXMED WILLOW AMBULATORY MEDICATION CHARGE: Performed by: NURSE PRACTITIONER

## 2023-12-22 ENCOUNTER — PHARMACY VISIT (OUTPATIENT)
Dept: PHARMACY | Facility: MEDICAL CENTER | Age: 68
End: 2023-12-22
Payer: MEDICARE

## 2023-12-27 ENCOUNTER — PHARMACY VISIT (OUTPATIENT)
Dept: PHARMACY | Facility: MEDICAL CENTER | Age: 68
End: 2023-12-27
Payer: MEDICARE

## 2023-12-27 PROCEDURE — RXMED WILLOW AMBULATORY MEDICATION CHARGE: Performed by: NURSE PRACTITIONER

## 2023-12-27 RX ORDER — HYDROCODONE BITARTRATE AND ACETAMINOPHEN 10; 325 MG/1; MG/1
TABLET ORAL
Qty: 140 TABLET | Refills: 0 | OUTPATIENT
Start: 2024-01-24

## 2023-12-27 RX ORDER — GABAPENTIN 300 MG/1
CAPSULE ORAL
Qty: 28 CAPSULE | Refills: 1 | OUTPATIENT
Start: 2023-12-27

## 2023-12-27 RX ORDER — HYDROCODONE BITARTRATE AND ACETAMINOPHEN 10; 325 MG/1; MG/1
TABLET ORAL
Qty: 140 TABLET | Refills: 0 | OUTPATIENT
Start: 2023-12-27

## 2023-12-28 ENCOUNTER — HOSPITAL ENCOUNTER (OUTPATIENT)
Dept: RADIOLOGY | Facility: MEDICAL CENTER | Age: 68
End: 2023-12-28
Attending: NURSE PRACTITIONER
Payer: MEDICARE

## 2023-12-28 DIAGNOSIS — S69.92XS INJURY OF LEFT HAND, SEQUELA: ICD-10-CM

## 2023-12-28 PROCEDURE — 73130 X-RAY EXAM OF HAND: CPT | Mod: LT

## 2023-12-29 ENCOUNTER — APPOINTMENT (OUTPATIENT)
Dept: URGENT CARE | Facility: CLINIC | Age: 68
End: 2023-12-29
Payer: MEDICARE

## 2023-12-29 ENCOUNTER — OFFICE VISIT (OUTPATIENT)
Dept: MEDICAL GROUP | Facility: MEDICAL CENTER | Age: 68
End: 2023-12-29
Attending: FAMILY MEDICINE
Payer: MEDICARE

## 2023-12-29 VITALS
DIASTOLIC BLOOD PRESSURE: 70 MMHG | RESPIRATION RATE: 16 BRPM | TEMPERATURE: 97.8 F | SYSTOLIC BLOOD PRESSURE: 126 MMHG | OXYGEN SATURATION: 95 % | HEART RATE: 60 BPM | HEIGHT: 68 IN | WEIGHT: 225 LBS | BODY MASS INDEX: 34.1 KG/M2

## 2023-12-29 DIAGNOSIS — S62.92XA CLOSED FRACTURE OF LEFT HAND, INITIAL ENCOUNTER: ICD-10-CM

## 2023-12-29 PROCEDURE — 99212 OFFICE O/P EST SF 10 MIN: CPT | Performed by: FAMILY MEDICINE

## 2023-12-29 PROCEDURE — 99214 OFFICE O/P EST MOD 30 MIN: CPT | Performed by: FAMILY MEDICINE

## 2023-12-29 PROCEDURE — 3078F DIAST BP <80 MM HG: CPT | Performed by: FAMILY MEDICINE

## 2023-12-29 PROCEDURE — 3074F SYST BP LT 130 MM HG: CPT | Performed by: FAMILY MEDICINE

## 2023-12-29 NOTE — PROGRESS NOTES
"Subjective   Chief Complaint   Patient presents with    Finger Fracture        HPI:   Allen presents today with    Left finger fracture sustained after a fall that occurred on 12/24/23; was cooking [employer] dinner and was going from kitchen to garage and had a slip and fall after he tripped over his dog and fell down the 3 steps and crashed into the freezer at bottom. Didn't realize that he had broken it since he was able to move it. He had an appointment with his pain management office 2 days ago who ordered x-rays for him.  And revealed \"an oblique, nondisplaced, nonangulated fracture through the fifth metacarpal shaft. Remainder of the hand is normal\".  He tried to schedule with orthopedics however was told they do not accept his insurance.  He he is most concerned about the healing process.  Sensation and function are intact.  Pain has been well-controlled on his current regimen.    Health Maintenance Due   Topic Date Due    COVID-19 Vaccine (1) Never done    Zoster (Shingles) Vaccines (1 of 2) Never done    Pneumococcal Vaccine: 65+ Years (1 - PCV) Never done    Influenza Vaccine (1) Never done       Objective   Social History     Tobacco Use    Smoking status: Never    Smokeless tobacco: Never   Substance Use Topics    Alcohol use: Yes     Alcohol/week: 0.0 oz     Comment: rare    Drug use: No     Comment: past drug use       Exam:  /70   Pulse 60   Temp 36.6 °C (97.8 °F)   Resp 16   Ht 1.727 m (5' 7.99\")   Wt 102 kg (225 lb)   SpO2 95%   BMI 34.22 kg/m²     Physical Exam  Musculoskeletal:      Right hand: Normal.      Comments: Left dorsum with diffuse ecchymoses, minor well healing abrasions, no TTP over MCP, PIP, DIP.        Allergies   Allergen Reactions    Nkda [No Known Drug Allergy]     Zofran [Dextrose-Ondansetron] Rash     Diffuse rash       [facility]  [address] [place] [id number]  Phone: [phone] Fax: [phone]    Current Outpatient Medications   Medication Sig " Dispense Refill    gabapentin (NEURONTIN) 300 MG Cap Take 1 capsule by mouth once a day 28 Capsule 1    [START ON 1/24/2024] HYDROcodone/acetaminophen (NORCO)  MG Tab Take 1 tablet by mouth five times daily as needed 140 Tablet 0    HYDROcodone/acetaminophen (NORCO)  MG Tab Take 1 tablet by mouth 5 times daily as needed 140 Tablet 0    tizanidine (ZANAFLEX) 4 MG Tab Take 1 Tablet by mouth every 6 hours as needed (Muscle Spasms). 60 Tablet 3    augmented betamethasone dipropionate (DIPROLENE-AF) 0.05 % ointment Apply 1 application topically 2 times a day. 50 g 0    atorvastatin (LIPITOR) 20 MG Tab Take 1 Tablet by mouth every evening. 90 Tablet 1    HYDROcodone/acetaminophen (NORCO)  MG Tab Take 1 tablet by mouth 5 times a day as needed. 15 Tablet 0    HYDROcodone/acetaminophen (NORCO)  MG Tab Take 1 tablet by mouth five times daily as needed. 150 Tablet 0    amitriptyline (ELAVIL) 10 MG Tab Take 1 tablet by mouth at bedtime 30 Tablet 0    triamterene-hctz (MAXZIDE-25/DYAZIDE) 37.5-25 MG Tab TAKE 1 TABLET ORALLY ONCE DAILY 90 Tablet 1    gabapentin (NEURONTIN) 300 MG Cap Take 1 capsule by mouth twice a day 60 Capsule 0    oxyCODONE-acetaminophen (PERCOCET-10)  MG Tab Take 1/2 to 1 tablet by mouth three to four times a day as needed.  105 Tablet 0    oxyCODONE-acetaminophen (PERCOCET-10)  MG Tab Take 1/2 to 1 tablet by mouth three to four times a day as needed. *DNFT 10/6/23* 105 Tablet 0    gabapentin (NEURONTIN) 300 MG Cap Take 1 Capsule by mouth Twice A Day 60 Capsule 0    oxyCODONE-acetaminophen (PERCOCET-10)  MG Tab Take 1/2 to 1 tablet by mouth three times a day as needed 90 Tablet 0    gabapentin (NEURONTIN) 300 MG Cap Take 1 Capsule by mouth 2 times a day. 60 Capsule 0    HYDROcodone/acetaminophen (NORCO)  MG Tab Take 1 tablet by mouth five times a day as needed for 28 days. 140 Tablet 0    omeprazole (PRILOSEC) 20 MG delayed-release capsule Take 1 capsule by  "mouth every day. 90 Capsule 3    Lidocaine, Anorectal, 5 % Cream 1 Gram Four Times A Day as needed for pain 120 g 1    HYDROcodone/acetaminophen (NORCO)  MG Tab Take 1 tablet by mouth 5 times a day as needed for pain 140 Tablet 0    HYDROcodone/acetaminophen (NORCO)  MG Tab Take 1 tablet by mouth 5 times a day as needed for pain 140 Tablet 0    dilTIAZem CD (CARDIZEM CD) 300 MG CAPSULE SR 24 HR TAKE 1 CAPSULE ORALLY ONCE DAILY 90 Capsule 3    erythromycin 5 MG/GM Ointment Apply a small amount to both eyes once a day at bed time. 3.5 g 4     No current facility-administered medications for this visit.       Assessment & Plan    68 y.o. male with the following -     1. Closed fracture of left hand, initial encounter  - Acute; clinically stable  - DOI: 12/24/23  - Left hand Xray from 12/28/23:an oblique, nondisplaced, nonangulated fracture through the fifth metacarpal shaft. Remainder of the hand is normal\".    -Discussed that since fracture is nondisplaced and nonangulated treatment primarily involves immobilization.  Recommend patient obtain ulnar gutter splint for this.  Will obtain serial x-rays to ensure routine healing.  - DX-FINGER(S) 2+ LEFT; Future  - DX-FINGER(S) 2+ LEFT; Future  -Referral to orthopedics already placed for additional evaluation and management considerations.  - Recommend close follow up to reassess and determine PT    Return if symptoms worsen or fail to improve.    Please note that this dictation was created using voice recognition software. I have made every reasonable attempt to correct obvious errors, but I expect that there are errors of grammar and possibly content that I did not discover before finalizing the note.        "

## 2024-01-05 ENCOUNTER — HOSPITAL ENCOUNTER (OUTPATIENT)
Dept: RADIOLOGY | Facility: MEDICAL CENTER | Age: 69
End: 2024-01-05
Attending: FAMILY MEDICINE
Payer: MEDICARE

## 2024-01-05 DIAGNOSIS — S62.92XA CLOSED FRACTURE OF LEFT HAND, INITIAL ENCOUNTER: ICD-10-CM

## 2024-01-05 PROCEDURE — 73130 X-RAY EXAM OF HAND: CPT | Mod: LT

## 2024-01-10 DIAGNOSIS — I10 ESSENTIAL HYPERTENSION: ICD-10-CM

## 2024-01-10 PROCEDURE — RXMED WILLOW AMBULATORY MEDICATION CHARGE: Performed by: FAMILY MEDICINE

## 2024-01-10 RX ORDER — TRIAMTERENE AND HYDROCHLOROTHIAZIDE 37.5; 25 MG/1; MG/1
TABLET ORAL
Qty: 100 TABLET | Refills: 3 | Status: SHIPPED | OUTPATIENT
Start: 2024-01-10

## 2024-01-11 PROCEDURE — RXMED WILLOW AMBULATORY MEDICATION CHARGE: Performed by: OPTOMETRIST

## 2024-01-11 PROCEDURE — RXMED WILLOW AMBULATORY MEDICATION CHARGE: Performed by: FAMILY MEDICINE

## 2024-01-12 ENCOUNTER — PHARMACY VISIT (OUTPATIENT)
Dept: PHARMACY | Facility: MEDICAL CENTER | Age: 69
End: 2024-01-12
Payer: COMMERCIAL

## 2024-01-17 ENCOUNTER — HOSPITAL ENCOUNTER (OUTPATIENT)
Dept: RADIOLOGY | Facility: MEDICAL CENTER | Age: 69
End: 2024-01-17
Attending: FAMILY MEDICINE
Payer: MEDICARE

## 2024-01-17 DIAGNOSIS — S62.92XA CLOSED FRACTURE OF LEFT HAND, INITIAL ENCOUNTER: ICD-10-CM

## 2024-01-17 PROCEDURE — 73130 X-RAY EXAM OF HAND: CPT | Mod: LT

## 2024-01-19 ENCOUNTER — OFFICE VISIT (OUTPATIENT)
Dept: MEDICAL GROUP | Facility: MEDICAL CENTER | Age: 69
End: 2024-01-19
Attending: FAMILY MEDICINE
Payer: MEDICARE

## 2024-01-19 VITALS
OXYGEN SATURATION: 98 % | TEMPERATURE: 97.7 F | HEART RATE: 68 BPM | RESPIRATION RATE: 16 BRPM | SYSTOLIC BLOOD PRESSURE: 128 MMHG | DIASTOLIC BLOOD PRESSURE: 66 MMHG | HEIGHT: 68 IN | BODY MASS INDEX: 35.16 KG/M2 | WEIGHT: 232 LBS

## 2024-01-19 DIAGNOSIS — E78.2 MIXED HYPERLIPIDEMIA: ICD-10-CM

## 2024-01-19 DIAGNOSIS — E66.9 OBESITY (BMI 30-39.9): ICD-10-CM

## 2024-01-19 DIAGNOSIS — I10 PRIMARY HYPERTENSION: ICD-10-CM

## 2024-01-19 DIAGNOSIS — N30.10 CHRONIC INTERSTITIAL CYSTITIS: Chronic | ICD-10-CM

## 2024-01-19 DIAGNOSIS — S62.92XD CLOSED FRACTURE OF LEFT HAND WITH ROUTINE HEALING, SUBSEQUENT ENCOUNTER: ICD-10-CM

## 2024-01-19 DIAGNOSIS — E66.01 MORBID (SEVERE) OBESITY DUE TO EXCESS CALORIES (HCC): ICD-10-CM

## 2024-01-19 DIAGNOSIS — Z28.20 VACCINE REFUSED BY PATIENT: ICD-10-CM

## 2024-01-19 PROBLEM — M25.562 LEFT ANTERIOR KNEE PAIN: Chronic | Status: ACTIVE | Noted: 2017-08-22

## 2024-01-19 PROCEDURE — 99214 OFFICE O/P EST MOD 30 MIN: CPT | Performed by: FAMILY MEDICINE

## 2024-01-19 PROCEDURE — 3074F SYST BP LT 130 MM HG: CPT | Performed by: FAMILY MEDICINE

## 2024-01-19 PROCEDURE — 99213 OFFICE O/P EST LOW 20 MIN: CPT | Performed by: FAMILY MEDICINE

## 2024-01-19 PROCEDURE — 3078F DIAST BP <80 MM HG: CPT | Performed by: FAMILY MEDICINE

## 2024-01-19 ASSESSMENT — PATIENT HEALTH QUESTIONNAIRE - PHQ9: CLINICAL INTERPRETATION OF PHQ2 SCORE: 0

## 2024-01-19 NOTE — ASSESSMENT & PLAN NOTE
Patient declines recommended vaccination except for tetanus vaccine.  Cites personal preference.  Prefers to maintain immunity naturally.

## 2024-01-19 NOTE — ASSESSMENT & PLAN NOTE
Patient had an consultation with [facility] orthopedics yesterday.  Reports that he is essentially returned to full functionality of his hand.  States that occasionally he will get brief spasms of pain with certain positions, however is significantly improved since initial injury last month.  He has follow-up with his orthopedist in 1 month.

## 2024-01-19 NOTE — ASSESSMENT & PLAN NOTE
This is a chronic condition.  Previously followed with urology for this.  States that symptoms are essentially unchanged.  He is well aware of dietary triggers that cause his symptoms and so has been managing primarily through lifestyle/dietary modifications.  He has tried various therapies in the past which he found ineffective.  He has tried working with his pain specialist regarding treatment that would allow him to sleep longer and so was started on amitriptyline for this.  He reports taking medication for a month but did not find it helpful so discontinued it.

## 2024-01-19 NOTE — ASSESSMENT & PLAN NOTE
Patient unfortunately has increased in weight on subsequent visits.  He acknowledged that this is a concern of his and will look towards more intensive lifestyle changes to help with this.

## 2024-01-19 NOTE — PROGRESS NOTES
Detail Level: Detailed Subjective   Chief Complaint   Patient presents with    Follow-Up        HPI:   Allen presents today for routine follow-up.  Currently without any acute complaints or concerns.    Chronic interstitial cystitis  This is a chronic condition.  Previously followed with urology for this.  States that symptoms are essentially unchanged.  He is well aware of dietary triggers that cause his symptoms and so has been managing primarily through lifestyle/dietary modifications.  He has tried various therapies in the past which he found ineffective.  He has tried working with his pain specialist regarding treatment that would allow him to sleep longer and so was started on amitriptyline for this.  He reports taking medication for a month but did not find it helpful so discontinued it.    Hyperlipidemia  Patient continues to take nightly statin without any adverse effects.    Morbid (severe) obesity due to excess calories (HCC)  Patient unfortunately has increased in weight on subsequent visits.  He acknowledged that this is a concern of his and will look towards more intensive lifestyle changes to help with this.    Closed left hand fracture  Patient had an consultation with [facility] orthopedics yesterday.  Reports that he is essentially returned to full functionality of his hand.  States that occasionally he will get brief spasms of pain with certain positions, however is significantly improved since initial injury last month.  He has follow-up with his orthopedist in 1 month.    Vaccine refused by patient  Patient declines recommended vaccination except for tetanus vaccine.  Cites personal preference.  Prefers to maintain immunity naturally.      Health Maintenance Due   Topic Date Due    COVID-19 Vaccine (1) Never done    Zoster (Shingles) Vaccines (1 of 2) Never done    Pneumococcal Vaccine: 65+ Years (1 - PCV) Never done    Influenza Vaccine (1) Never done       Objective   Social History     Tobacco Use    Smoking status:  "Never    Smokeless tobacco: Never   Substance Use Topics    Alcohol use: Yes     Alcohol/week: 0.0 oz     Comment: rare    Drug use: No     Comment: past drug use       Exam:  /66   Pulse 68   Temp 36.5 °C (97.7 °F)   Resp 16   Ht 1.727 m (5' 7.99\")   Wt 105 kg (232 lb)   SpO2 98%   BMI 35.28 kg/m²     Physical Exam  Constitutional: Alert, no distress  Skin: No rashes in visible areas  Eye: Conjunctiva clear, lids normal  Respiratory: Unlabored respiratory effort, no cough  MSK: Normal gait, moves all extremities, full flexion extension of bilateral hands. No significant erythema or swelling.  Psych: Alert and oriented x3, normal affect and mood    Allergies   Allergen Reactions    Nkda [No Known Drug Allergy]     Zofran [Dextrose-Ondansetron] Rash     Diffuse rash       [facility] - [address] [address]  Phone: [phone] Fax: [phone]    Current Outpatient Medications   Medication Sig Dispense Refill    triamterene-hctz (MAXZIDE-25/DYAZIDE) 37.5-25 MG Tab TAKE 1 TABLET ORALLY ONCE DAILY 100 Tablet 3    gabapentin (NEURONTIN) 300 MG Cap Take 1 capsule by mouth once a day 28 Capsule 1    [START ON 1/24/2024] HYDROcodone/acetaminophen (NORCO)  MG Tab Take 1 tablet by mouth five times daily as needed 140 Tablet 0    HYDROcodone/acetaminophen (NORCO)  MG Tab Take 1 tablet by mouth 5 times daily as needed 140 Tablet 0    tizanidine (ZANAFLEX) 4 MG Tab Take 1 Tablet by mouth every 6 hours as needed (Muscle Spasms). 60 Tablet 3    augmented betamethasone dipropionate (DIPROLENE-AF) 0.05 % ointment Apply 1 application topically 2 times a day. 50 g 0    atorvastatin (LIPITOR) 20 MG Tab Take 1 Tablet by mouth every evening. 90 Tablet 1    HYDROcodone/acetaminophen (NORCO)  MG Tab Take 1 tablet by mouth 5 times a day as needed. 15 Tablet 0    HYDROcodone/acetaminophen (NORCO)  MG Tab Take 1 tablet by mouth five times daily as needed. 150 Tablet 0    gabapentin " Quality 226: Preventive Care And Screening: Tobacco Use: Screening And Cessation Intervention: Patient screened for tobacco use and is an ex/non-smoker (NEURONTIN) 300 MG Cap Take 1 capsule by mouth twice a day 60 Capsule 0    oxyCODONE-acetaminophen (PERCOCET-10)  MG Tab Take 1/2 to 1 tablet by mouth three to four times a day as needed.  105 Tablet 0    oxyCODONE-acetaminophen (PERCOCET-10)  MG Tab Take 1/2 to 1 tablet by mouth three to four times a day as needed. *DNFT 10/6/23* 105 Tablet 0    gabapentin (NEURONTIN) 300 MG Cap Take 1 Capsule by mouth Twice A Day 60 Capsule 0    oxyCODONE-acetaminophen (PERCOCET-10)  MG Tab Take 1/2 to 1 tablet by mouth three times a day as needed 90 Tablet 0    gabapentin (NEURONTIN) 300 MG Cap Take 1 Capsule by mouth 2 times a day. 60 Capsule 0    HYDROcodone/acetaminophen (NORCO)  MG Tab Take 1 tablet by mouth five times a day as needed for 28 days. 140 Tablet 0    omeprazole (PRILOSEC) 20 MG delayed-release capsule Take 1 capsule by mouth every day. 90 Capsule 3    Lidocaine, Anorectal, 5 % Cream 1 Gram Four Times A Day as needed for pain 120 g 1    HYDROcodone/acetaminophen (NORCO)  MG Tab Take 1 tablet by mouth 5 times a day as needed for pain 140 Tablet 0    HYDROcodone/acetaminophen (NORCO)  MG Tab Take 1 tablet by mouth 5 times a day as needed for pain 140 Tablet 0    dilTIAZem CD (CARDIZEM CD) 300 MG CAPSULE SR 24 HR TAKE 1 CAPSULE ORALLY ONCE DAILY 90 Capsule 3    erythromycin 5 MG/GM Ointment Apply a small amount to both eyes once a day at bed time. 3.5 g 4     No current facility-administered medications for this visit.       Assessment & Plan    68 y.o. male with the following -     1. Closed fracture of left hand with routine healing, subsequent encounter  - Acute; improving  -Encourage continuation of range of motion exercises  - Follow-up with specialist in 1 month as recommended    2. Chronic interstitial cystitis  -Chronic; clinically stable  - Extensive discussion regarding continued symptoms that are interfering with his sleep.  Stressed importance of obtaining  Quality 130: Documentation Of Current Medications In The Medical Record: Current Medications Documented Quality 431: Preventive Care And Screening: Unhealthy Alcohol Use - Screening: Patient not identified as an unhealthy alcohol user when screened for unhealthy alcohol use using a systematic screening method restful/restorative sleep at least 6 to 7 hours for overall improved health and wellbeing.  - Patient education sent regarding various treatment options and encourage patient to schedule follow-up with his urologist to further discuss other treatment strategies that may be of benefit for him    3. Morbid (severe) obesity due to excess calories (HCC)  5. Body mass index (BMI) 35.0-35.9, adult  6. Obesity (BMI 30-39.9)  HCC Gap Form    Diagnosis: E66.01 - Morbid (severe) obesity due to excess calories (HCC)  Z68.35 - Body mass index (BMI) 35.0-35.9, adult  Comorbidity Diagnosis: Primary hypertension  The current BMI is 35.28 kg/m2 as of 01/19/24 08:06 PST  Assessment and plan: Chronic, worsening. Encouraged healthy diet and physical activity changes with a goal of weight loss. Follow up at least annually. The comorbid condition is stable  based on today's assessment. Continue current treatment plan including control of risk factors. Follow up in 3 months.  Last edited 01/19/24 08:36 PST by Leslee Young M.D.       - Patient identified as having weight management issue.  Appropriate orders and counseling given.    4. Primary hypertension  Chronic, controlled.  Blood pressure is at goal under 140/90 in the office today.  We will continue the current regimen with triamterene-hydrochlorothiazide 37.5-25 mg daily and Cardizem 300 mg SR daily. Continue heart healthy diet and at least 150min of aerobic exercise/week  -Continue statin given elevated ASCVD risk  - Medications refilled  - Reassess in 6-12 months    7. Mixed hyperlipidemia  The 10-year ASCVD risk score ([place] DK, et al., 2019) is: 13.9%  -Chronic; clinically stable.  As above encourage healthy lifestyle habits    8. Vaccine refused by patient  -Extensive discussion regarding vaccine hesitancy.  Encourage patient to follow-up with any specific questions or concerns.  Will continue to reassess on subsequent visits    Return in about 3 months (around  4/19/2024), or if symptoms worsen or fail to improve, for chronic condition follow up.    Please note that this dictation was created using voice recognition software. I have made every reasonable attempt to correct obvious errors, but I expect that there are errors of grammar and possibly content that I did not discover before finalizing the note.

## 2024-01-24 ENCOUNTER — PHARMACY VISIT (OUTPATIENT)
Dept: PHARMACY | Facility: MEDICAL CENTER | Age: 69
End: 2024-01-24
Payer: COMMERCIAL

## 2024-01-24 PROCEDURE — RXMED WILLOW AMBULATORY MEDICATION CHARGE: Performed by: NURSE PRACTITIONER

## 2024-02-10 PROCEDURE — RXMED WILLOW AMBULATORY MEDICATION CHARGE: Performed by: FAMILY MEDICINE

## 2024-02-12 ENCOUNTER — PHARMACY VISIT (OUTPATIENT)
Dept: PHARMACY | Facility: MEDICAL CENTER | Age: 69
End: 2024-02-12
Payer: COMMERCIAL

## 2024-02-15 PROCEDURE — RXMED WILLOW AMBULATORY MEDICATION CHARGE: Performed by: PHYSICIAN ASSISTANT

## 2024-02-16 ENCOUNTER — PHARMACY VISIT (OUTPATIENT)
Dept: PHARMACY | Facility: MEDICAL CENTER | Age: 69
End: 2024-02-16
Payer: COMMERCIAL

## 2024-02-20 PROCEDURE — RXMED WILLOW AMBULATORY MEDICATION CHARGE: Performed by: STUDENT IN AN ORGANIZED HEALTH CARE EDUCATION/TRAINING PROGRAM

## 2024-02-21 ENCOUNTER — OFFICE VISIT (OUTPATIENT)
Dept: MEDICAL GROUP | Facility: MEDICAL CENTER | Age: 69
End: 2024-02-21
Payer: MEDICARE

## 2024-02-21 VITALS
HEIGHT: 68 IN | WEIGHT: 225.2 LBS | BODY MASS INDEX: 34.13 KG/M2 | OXYGEN SATURATION: 93 % | SYSTOLIC BLOOD PRESSURE: 170 MMHG | HEART RATE: 71 BPM | TEMPERATURE: 97.5 F | RESPIRATION RATE: 16 BRPM | DIASTOLIC BLOOD PRESSURE: 102 MMHG

## 2024-02-21 DIAGNOSIS — J02.9 ACUTE PHARYNGITIS, UNSPECIFIED ETIOLOGY: ICD-10-CM

## 2024-02-21 PROCEDURE — RXMED WILLOW AMBULATORY MEDICATION CHARGE: Performed by: STUDENT IN AN ORGANIZED HEALTH CARE EDUCATION/TRAINING PROGRAM

## 2024-02-21 PROCEDURE — 3080F DIAST BP >= 90 MM HG: CPT

## 2024-02-21 PROCEDURE — 99213 OFFICE O/P EST LOW 20 MIN: CPT

## 2024-02-21 PROCEDURE — 3077F SYST BP >= 140 MM HG: CPT

## 2024-02-21 NOTE — PROGRESS NOTES
Chief Complaint   Patient presents with    Cough    Pharyngitis        HPI: Patient is a 68 y.o. male complaining of 4 days of illness including: dry and painful cough, fever, sore throat.   Mucus is: thick.  Similarly ill exposures: no.  Treatments tried: Cough drops, cough medicine  He  reports that he has never smoked. He has never used smokeless tobacco..     ROS:  No fever, nausea, changes in bowel movements or skin rash.     I reviewed the patient's medications, allergies and medical history:  Current Outpatient Medications   Medication Sig Dispense Refill    gabapentin (NEURONTIN) 300 MG Cap Take 1 capsule by mouth every day. 28 Capsule 1    [START ON 3/20/2024] HYDROcodone/acetaminophen (NORCO)  MG Tab Take 1 tablet by mouth 5 Times a Day for 28 days. DNFU 3/20/24 140 Tablet 0    HYDROcodone/acetaminophen (NORCO)  MG Tab Take 1 Tablet by mouth 5 Times a Day for 28 days. DNFU :02/21/24 140 Tablet 0    meloxicam (MOBIC) 15 MG tablet Take 1 tablet by mouth every day. 30 Tablet 0    meloxicam (MOBIC) 15 MG tablet Take 1 tablet by mouth every day. 30 Tablet 0    triamterene-hctz (MAXZIDE-25/DYAZIDE) 37.5-25 MG Tab TAKE 1 TABLET ORALLY ONCE DAILY 100 Tablet 3    gabapentin (NEURONTIN) 300 MG Cap Take 1 capsule by mouth once a day 28 Capsule 1    HYDROcodone/acetaminophen (NORCO)  MG Tab Take 1 tablet by mouth five times daily as needed 140 Tablet 0    HYDROcodone/acetaminophen (NORCO)  MG Tab Take 1 tablet by mouth 5 times daily as needed 140 Tablet 0    tizanidine (ZANAFLEX) 4 MG Tab Take 1 Tablet by mouth every 6 hours as needed (Muscle Spasms). 60 Tablet 3    augmented betamethasone dipropionate (DIPROLENE-AF) 0.05 % ointment Apply 1 application topically 2 times a day. 50 g 0    atorvastatin (LIPITOR) 20 MG Tab Take 1 Tablet by mouth every evening. 90 Tablet 1    HYDROcodone/acetaminophen (NORCO)  MG Tab Take 1 tablet by mouth 5 times a day as needed. 15 Tablet 0     HYDROcodone/acetaminophen (NORCO)  MG Tab Take 1 tablet by mouth five times daily as needed. 150 Tablet 0    gabapentin (NEURONTIN) 300 MG Cap Take 1 capsule by mouth twice a day 60 Capsule 0    oxyCODONE-acetaminophen (PERCOCET-10)  MG Tab Take 1/2 to 1 tablet by mouth three to four times a day as needed.  105 Tablet 0    oxyCODONE-acetaminophen (PERCOCET-10)  MG Tab Take 1/2 to 1 tablet by mouth three to four times a day as needed. *DNFT 10/6/23* 105 Tablet 0    gabapentin (NEURONTIN) 300 MG Cap Take 1 Capsule by mouth Twice A Day 60 Capsule 0    oxyCODONE-acetaminophen (PERCOCET-10)  MG Tab Take 1/2 to 1 tablet by mouth three times a day as needed 90 Tablet 0    gabapentin (NEURONTIN) 300 MG Cap Take 1 Capsule by mouth 2 times a day. 60 Capsule 0    HYDROcodone/acetaminophen (NORCO)  MG Tab Take 1 tablet by mouth five times a day as needed for 28 days. 140 Tablet 0    omeprazole (PRILOSEC) 20 MG delayed-release capsule Take 1 capsule by mouth every day. 90 Capsule 3    Lidocaine, Anorectal, 5 % Cream 1 Gram Four Times A Day as needed for pain 120 g 1    HYDROcodone/acetaminophen (NORCO)  MG Tab Take 1 tablet by mouth 5 times a day as needed for pain 140 Tablet 0    HYDROcodone/acetaminophen (NORCO)  MG Tab Take 1 tablet by mouth 5 times a day as needed for pain 140 Tablet 0    dilTIAZem CD (CARDIZEM CD) 300 MG CAPSULE SR 24 HR TAKE 1 CAPSULE ORALLY ONCE DAILY 90 Capsule 3    erythromycin 5 MG/GM Ointment Apply a small amount to both eyes once a day at bed time. 3.5 g 4     No current facility-administered medications for this visit.     Nkda [no known drug allergy] and Zofran [dextrose-ondansetron]  Past Medical History:   Diagnosis Date    Dyshidrotic eczema     Headache(784.0) 1996    Hypertension     Interstitial cystitis 2005    Obesity     Pain     throat    Sprain of neck     Urinary bladder disorder         EXAM:  BP (!) 170/102 (BP Location: Right arm, Patient  "Position: Sitting, BP Cuff Size: Adult)   Pulse 71   Temp 36.4 °C (97.5 °F) (Temporal)   Resp 16   Ht 1.727 m (5' 8\")   Wt 102 kg (225 lb 3.2 oz)   SpO2 93%   General: Alert, no conversational dyspnea or audible wheeze, non-toxic appearance.  Eyes: PERRL, conjunctiva slightly injected, no eye discharge.  Ears: Normal pinnae,TM's normal bilaterally.  Nares: Patent with thin mucus.  Sinuses: non tender over maxillary / frontal sinuses.  Throat: Erythematous injection with exudate.   Neck: Supple, with submandibular lymph node swelling that is tender, erythematous bilaterally.  Lungs: Clear to auscultation bilaterally, no wheeze, crackles or rhonchi.   Heart: Regular rate without murmur.  Skin: Warm and dry without rash.     ASSESSMENT:   1. Acute pharyngitis, unspecified etiology    Acute, etiology unsure.  Unfortunately strep testing is unavailable in our office. Centor score 2.  Given that strep testing is unavailable antibiotic treatment was discussed with the patient at this time he declines and would like to continue with symptomatic management.  We discussed that if his symptoms persist in a few days he should return to the clinic for strep testing and treatment. I have recommended supportive care to include warm salt water gargles, warm fluids, NSAIDs per  label instructions, Tylenol as needed for fevers. Patient is to return to  or go to ER for any new or worsening signs or symptoms, and follow for recheck. Patient is agreeable with plan of care and verbalizes good understanding.          "

## 2024-02-21 NOTE — PATIENT INSTRUCTIONS
Pharyngitis    Pharyngitis is inflammation of the throat (pharynx). It is a very common cause of sore throat. Pharyngitis can be caused by a bacteria, but it is usually caused by a virus. Most cases of pharyngitis get better on their own without treatment.  What are the causes?  This condition may be caused by:  Infection by viruses (viral). Viral pharyngitis spreads easily from person to person (is contagious) through coughing, sneezing, and sharing of personal items or utensils such as cups, forks, spoons, and toothbrushes.  Infection by bacteria (bacterial). Bacterial pharyngitis may be spread by touching the nose or face after coming in contact with the bacteria, or through close contact, such as kissing.  Allergies. Allergies can cause buildup of mucus in the throat (post-nasal drip), leading to inflammation and irritation. Allergies can also cause blocked nasal passages, forcing breathing through the mouth, which dries and irritates the throat.  What increases the risk?  You are more likely to develop this condition if:  You are 5-24 years old.  You are exposed to crowded environments such as , school, or dormitory living.  You live in a cold climate.  You have a weakened disease-fighting (immune) system.  What are the signs or symptoms?  Symptoms of this condition vary by the cause. Common symptoms of this condition include:  Sore throat.  Fatigue.  Low-grade fever.  Stuffy nose (nasal congestion) and cough.  Headache.  Other symptoms may include:  Glands in the neck (lymph nodes) that are swollen.  Skin rashes.  Plaque-like film on the throat or tonsils. This is often a symptom of bacterial pharyngitis.  Vomiting.  Red, itchy eyes (conjunctivitis).  Loss of appetite.  Joint pain and muscle aches.  Enlarged tonsils.  How is this diagnosed?  This condition may be diagnosed based on your medical history and a physical exam. Your health care provider will ask you questions about your illness and your  symptoms.  A swab of your throat may be done to check for bacteria (rapid strep test). Other lab tests may also be done, depending on the suspected cause, but these are rare.  How is this treated?  Many times, treatment is not needed for this condition. Pharyngitis usually gets better in 3-4 days without treatment.  Bacterial pharyngitis may be treated with antibiotic medicines.  Follow these instructions at home:  Medicines  Take over-the-counter and prescription medicines only as told by your health care provider.  If you were prescribed an antibiotic medicine, take it as told by your health care provider. Do not stop taking the antibiotic even if you start to feel better.  Use throat sprays to soothe your throat as told by your health care provider.  Children can get pharyngitis. Do not give your child aspirin because of the association with Reye's syndrome.  Managing pain  To help with pain, try:  Sipping warm liquids, such as broth, herbal tea, or warm water.  Eating or drinking cold or frozen liquids, such as frozen ice pops.  Gargling with a mixture of salt and water 3-4 times a day or as needed. To make salt water, completely dissolve ½-1 tsp (3-6 g) of salt in 1 cup (237 mL) of warm water.  Sucking on hard candy or throat lozenges.  Putting a cool-mist humidifier in your bedroom at night to moisten the air.  Sitting in the bathroom with the door closed for 5-10 minutes while you run hot water in the shower.    General instructions    Do not use any products that contain nicotine or tobacco. These products include cigarettes, chewing tobacco, and vaping devices, such as e-cigarettes. If you need help quitting, ask your health care provider.  Rest as told by your health care provider.  Drink enough fluid to keep your urine pale yellow.  How is this prevented?  To help prevent becoming infected or spreading infection:  Wash your hands often with soap and water for at least 20 seconds. If soap and water are not  available, use hand .  Do not touch your eyes, nose, or mouth with unwashed hands, and wash hands after touching these areas.  Do not share cups or eating utensils.  Avoid close contact with people who are sick.  Contact a health care provider if:  You have large, tender lumps in your neck.  You have a rash.  You cough up green, yellow-brown, or bloody mucus.  Get help right away if:  Your neck becomes stiff.  You drool or are unable to swallow liquids.  You cannot drink or take medicines without vomiting.  You have severe pain that does not go away, even after you take medicine.  You have trouble breathing, and it is not caused by a stuffy nose.  You have new pain and swelling in your joints such as the knees, ankles, wrists, or elbows.  These symptoms may represent a serious problem that is an emergency. Do not wait to see if the symptoms will go away. Get medical help right away. Call your local emergency services (911 in the [place] Do not drive yourself to the hospital.  Summary  Pharyngitis is redness, pain, and swelling (inflammation) of the throat (pharynx).  While pharyngitis can be caused by a bacteria, the most common causes are viral.  Most cases of pharyngitis get better on their own without treatment.  Bacterial pharyngitis is treated with antibiotic medicines.  This information is not intended to replace advice given to you by your health care provider. Make sure you discuss any questions you have with your health care provider.  Document Revised: 03/16/2022 Document Reviewed: 03/16/2022  Elsevier Patient Education © 2023 [employer]

## 2024-02-22 ENCOUNTER — PHARMACY VISIT (OUTPATIENT)
Dept: PHARMACY | Facility: MEDICAL CENTER | Age: 69
End: 2024-02-22
Payer: COMMERCIAL

## 2024-03-20 ENCOUNTER — PHARMACY VISIT (OUTPATIENT)
Dept: PHARMACY | Facility: MEDICAL CENTER | Age: 69
End: 2024-03-20
Payer: COMMERCIAL

## 2024-03-20 DIAGNOSIS — L30.9 DERMATITIS: ICD-10-CM

## 2024-03-20 PROCEDURE — RXMED WILLOW AMBULATORY MEDICATION CHARGE: Performed by: PHYSICIAN ASSISTANT

## 2024-03-20 PROCEDURE — RXMED WILLOW AMBULATORY MEDICATION CHARGE: Performed by: FAMILY MEDICINE

## 2024-03-20 PROCEDURE — RXMED WILLOW AMBULATORY MEDICATION CHARGE: Performed by: STUDENT IN AN ORGANIZED HEALTH CARE EDUCATION/TRAINING PROGRAM

## 2024-03-20 RX ORDER — BETAMETHASONE DIPROPIONATE 0.5 MG/G
1 OINTMENT, AUGMENTED TOPICAL 2 TIMES DAILY
Qty: 50 G | Refills: 2 | Status: SHIPPED | OUTPATIENT
Start: 2024-03-20

## 2024-03-20 NOTE — TELEPHONE ENCOUNTER
Received request via: Pharmacy    Was the patient seen in the last year in this department? Yes    Does the patient have an active prescription (recently filled or refills available) for medication(s) requested? No    [facility]    Does the patient have custodial Plus and need 100 day supply (blood pressure, diabetes and cholesterol meds only)? Patient does not have SCP

## 2024-03-21 ENCOUNTER — HOSPITAL ENCOUNTER (OUTPATIENT)
Dept: LAB | Facility: MEDICAL CENTER | Age: 69
End: 2024-03-21
Attending: PHYSICIAN ASSISTANT
Payer: MEDICARE

## 2024-03-21 LAB
BUN SERPL-MCNC: 21 MG/DL (ref 8–22)
CREAT SERPL-MCNC: 0.74 MG/DL (ref 0.5–1.4)
GFR SERPLBLD CREATININE-BSD FMLA CKD-EPI: 99 ML/MIN/1.73 M 2

## 2024-03-21 PROCEDURE — 36415 COLL VENOUS BLD VENIPUNCTURE: CPT

## 2024-03-21 PROCEDURE — 82565 ASSAY OF CREATININE: CPT

## 2024-03-21 PROCEDURE — 84520 ASSAY OF UREA NITROGEN: CPT

## 2024-03-22 ENCOUNTER — PHARMACY VISIT (OUTPATIENT)
Dept: PHARMACY | Facility: MEDICAL CENTER | Age: 69
End: 2024-03-22

## 2024-03-29 ENCOUNTER — HOSPITAL ENCOUNTER (OUTPATIENT)
Dept: RADIOLOGY | Facility: MEDICAL CENTER | Age: 69
End: 2024-03-29
Attending: PHYSICIAN ASSISTANT
Payer: MEDICARE

## 2024-03-29 DIAGNOSIS — M89.9 DISORDER OF BONE: ICD-10-CM

## 2024-03-29 PROCEDURE — 73720 MRI LWR EXTREMITY W/O&W/DYE: CPT | Mod: RT

## 2024-03-29 PROCEDURE — A9579 GAD-BASE MR CONTRAST NOS,1ML: HCPCS | Mod: UD | Performed by: PHYSICIAN ASSISTANT

## 2024-03-29 PROCEDURE — 700117 HCHG RX CONTRAST REV CODE 255: Mod: UD | Performed by: PHYSICIAN ASSISTANT

## 2024-03-29 RX ADMIN — GADOTERIDOL 20 ML: 279.3 INJECTION, SOLUTION INTRAVENOUS at 10:42

## 2024-04-02 ENCOUNTER — HOSPITAL ENCOUNTER (OUTPATIENT)
Dept: RADIOLOGY | Facility: MEDICAL CENTER | Age: 69
End: 2024-04-02
Payer: MEDICARE

## 2024-04-11 DIAGNOSIS — I10 ESSENTIAL HYPERTENSION: ICD-10-CM

## 2024-04-11 PROCEDURE — RXMED WILLOW AMBULATORY MEDICATION CHARGE: Performed by: FAMILY MEDICINE

## 2024-04-11 PROCEDURE — RXMED WILLOW AMBULATORY MEDICATION CHARGE: Performed by: STUDENT IN AN ORGANIZED HEALTH CARE EDUCATION/TRAINING PROGRAM

## 2024-04-12 ENCOUNTER — PHARMACY VISIT (OUTPATIENT)
Dept: PHARMACY | Facility: MEDICAL CENTER | Age: 69
End: 2024-04-12
Payer: COMMERCIAL

## 2024-04-12 DIAGNOSIS — I10 ESSENTIAL HYPERTENSION: ICD-10-CM

## 2024-04-12 RX ORDER — DILTIAZEM HYDROCHLORIDE 300 MG/1
CAPSULE, COATED, EXTENDED RELEASE ORAL
Qty: 90 CAPSULE | Refills: 3 | OUTPATIENT
Start: 2024-04-12

## 2024-04-17 ENCOUNTER — PHARMACY VISIT (OUTPATIENT)
Dept: PHARMACY | Facility: MEDICAL CENTER | Age: 69
End: 2024-04-17
Payer: COMMERCIAL

## 2024-04-17 DIAGNOSIS — I10 ESSENTIAL HYPERTENSION: ICD-10-CM

## 2024-04-17 PROCEDURE — RXMED WILLOW AMBULATORY MEDICATION CHARGE: Performed by: NURSE PRACTITIONER

## 2024-04-17 PROCEDURE — RXMED WILLOW AMBULATORY MEDICATION CHARGE

## 2024-04-17 RX ORDER — HYDROCODONE BITARTRATE AND ACETAMINOPHEN 10; 325 MG/1; MG/1
TABLET ORAL
Qty: 140 TABLET | Refills: 0 | OUTPATIENT
Start: 2024-04-17

## 2024-04-17 RX ORDER — GABAPENTIN 300 MG/1
CAPSULE ORAL
Qty: 28 CAPSULE | Refills: 1 | OUTPATIENT
Start: 2024-04-17

## 2024-04-17 RX ORDER — DILTIAZEM HYDROCHLORIDE 300 MG/1
CAPSULE, COATED, EXTENDED RELEASE ORAL
Qty: 100 CAPSULE | Refills: 3 | Status: SHIPPED | OUTPATIENT
Start: 2024-04-17

## 2024-04-17 RX ORDER — HYDROCODONE BITARTRATE AND ACETAMINOPHEN 10; 325 MG/1; MG/1
TABLET ORAL
Qty: 140 TABLET | Refills: 0 | OUTPATIENT
Start: 2024-05-15

## 2024-04-18 ENCOUNTER — PHARMACY VISIT (OUTPATIENT)
Dept: PHARMACY | Facility: MEDICAL CENTER | Age: 69
End: 2024-04-18
Payer: COMMERCIAL

## 2024-05-01 PROCEDURE — RXMED WILLOW AMBULATORY MEDICATION CHARGE: Performed by: NURSE PRACTITIONER

## 2024-05-02 DIAGNOSIS — E78.2 MIXED HYPERLIPIDEMIA: ICD-10-CM

## 2024-05-03 RX ORDER — ATORVASTATIN CALCIUM 20 MG/1
20 TABLET, FILM COATED ORAL NIGHTLY
Qty: 100 TABLET | Refills: 3 | Status: SHIPPED | OUTPATIENT
Start: 2024-05-03

## 2024-05-03 NOTE — TELEPHONE ENCOUNTER
Received request via: Patient    Was the patient seen in the last year in this department? Yes    Does the patient have an active prescription (recently filled or refills available) for medication(s) requested? No    Pharmacy Name: [facility]    Does the patient have shelter Plus and need 100 day supply (blood pressure, diabetes and cholesterol meds only)? Patient does not have SCP

## 2024-05-06 PROCEDURE — RXMED WILLOW AMBULATORY MEDICATION CHARGE: Performed by: FAMILY MEDICINE

## 2024-05-08 ENCOUNTER — PHARMACY VISIT (OUTPATIENT)
Dept: PHARMACY | Facility: MEDICAL CENTER | Age: 69
End: 2024-05-08
Payer: COMMERCIAL

## 2024-05-15 ENCOUNTER — PHARMACY VISIT (OUTPATIENT)
Dept: PHARMACY | Facility: MEDICAL CENTER | Age: 69
End: 2024-05-15
Payer: COMMERCIAL

## 2024-05-15 PROCEDURE — RXMED WILLOW AMBULATORY MEDICATION CHARGE: Performed by: NURSE PRACTITIONER

## 2024-06-11 PROCEDURE — RXMED WILLOW AMBULATORY MEDICATION CHARGE: Performed by: FAMILY MEDICINE

## 2024-06-11 PROCEDURE — RXMED WILLOW AMBULATORY MEDICATION CHARGE: Performed by: NURSE PRACTITIONER

## 2024-06-12 ENCOUNTER — PHARMACY VISIT (OUTPATIENT)
Dept: PHARMACY | Facility: MEDICAL CENTER | Age: 69
End: 2024-06-12
Payer: COMMERCIAL

## 2024-06-12 PROCEDURE — RXMED WILLOW AMBULATORY MEDICATION CHARGE: Performed by: PAIN MEDICINE

## 2024-06-12 RX ORDER — HYDROCODONE BITARTRATE AND ACETAMINOPHEN 10; 325 MG/1; MG/1
TABLET ORAL
Qty: 140 TABLET | Refills: 0 | OUTPATIENT
Start: 2024-07-10

## 2024-06-12 RX ORDER — HYDROCODONE BITARTRATE AND ACETAMINOPHEN 10; 325 MG/1; MG/1
TABLET ORAL
Qty: 140 TABLET | Refills: 0 | OUTPATIENT
Start: 2024-06-12

## 2024-06-18 PROCEDURE — RXMED WILLOW AMBULATORY MEDICATION CHARGE: Performed by: PHYSICIAN ASSISTANT

## 2024-06-18 PROCEDURE — RXMED WILLOW AMBULATORY MEDICATION CHARGE

## 2024-06-21 ENCOUNTER — PHARMACY VISIT (OUTPATIENT)
Dept: PHARMACY | Facility: MEDICAL CENTER | Age: 69
End: 2024-06-21
Payer: COMMERCIAL

## 2024-07-06 PROCEDURE — RXMED WILLOW AMBULATORY MEDICATION CHARGE

## 2024-07-06 PROCEDURE — RXMED WILLOW AMBULATORY MEDICATION CHARGE: Performed by: NURSE PRACTITIONER

## 2024-07-06 PROCEDURE — RXMED WILLOW AMBULATORY MEDICATION CHARGE: Performed by: FAMILY MEDICINE

## 2024-07-10 ENCOUNTER — PHARMACY VISIT (OUTPATIENT)
Dept: PHARMACY | Facility: MEDICAL CENTER | Age: 69
End: 2024-07-10
Payer: COMMERCIAL

## 2024-07-10 PROCEDURE — RXMED WILLOW AMBULATORY MEDICATION CHARGE: Performed by: PAIN MEDICINE

## 2024-07-31 ENCOUNTER — PHARMACY VISIT (OUTPATIENT)
Dept: PHARMACY | Facility: MEDICAL CENTER | Age: 69
End: 2024-07-31
Payer: COMMERCIAL

## 2024-07-31 PROCEDURE — RXMED WILLOW AMBULATORY MEDICATION CHARGE: Performed by: PHYSICIAN ASSISTANT

## 2024-07-31 RX ORDER — MELOXICAM 7.5 MG/1
TABLET ORAL
Qty: 60 TABLET | Refills: 0 | OUTPATIENT
Start: 2024-07-31

## 2024-08-07 ENCOUNTER — PHARMACY VISIT (OUTPATIENT)
Dept: PHARMACY | Facility: MEDICAL CENTER | Age: 69
End: 2024-08-07
Payer: COMMERCIAL

## 2024-08-07 PROCEDURE — RXMED WILLOW AMBULATORY MEDICATION CHARGE: Performed by: PHYSICAL MEDICINE & REHABILITATION

## 2024-08-08 DIAGNOSIS — K21.9 GASTROESOPHAGEAL REFLUX DISEASE WITHOUT ESOPHAGITIS: ICD-10-CM

## 2024-08-08 PROCEDURE — RXMED WILLOW AMBULATORY MEDICATION CHARGE: Performed by: FAMILY MEDICINE

## 2024-08-08 NOTE — TELEPHONE ENCOUNTER
Received request via: Patient    Was the patient seen in the last year in this department? Yes    Does the patient have an active prescription (recently filled or refills available) for medication(s) requested? No    Pharmacy Name: [facility]    Does the patient have detention Plus and need 100-day supply? (This applies to ALL medications) Patient does not have SCP    Future Appointments         Provider Department Center    9/26/2024 4:30 PM (Arrive by 4:15 PM) [facility] [place] [facility] [place]

## 2024-08-12 ENCOUNTER — PHARMACY VISIT (OUTPATIENT)
Dept: PHARMACY | Facility: MEDICAL CENTER | Age: 69
End: 2024-08-12
Payer: COMMERCIAL

## 2024-08-15 PROCEDURE — RXMED WILLOW AMBULATORY MEDICATION CHARGE: Performed by: PHYSICAL MEDICINE & REHABILITATION

## 2024-08-16 ENCOUNTER — PHARMACY VISIT (OUTPATIENT)
Dept: PHARMACY | Facility: MEDICAL CENTER | Age: 69
End: 2024-08-16
Payer: COMMERCIAL

## 2024-09-04 ENCOUNTER — PHARMACY VISIT (OUTPATIENT)
Dept: PHARMACY | Facility: MEDICAL CENTER | Age: 69
End: 2024-09-04
Payer: COMMERCIAL

## 2024-09-04 PROCEDURE — RXMED WILLOW AMBULATORY MEDICATION CHARGE: Performed by: PHYSICAL MEDICINE & REHABILITATION

## 2024-09-04 RX ORDER — HYDROCODONE BITARTRATE AND ACETAMINOPHEN 10; 325 MG/1; MG/1
TABLET ORAL
Qty: 150 TABLET | Refills: 0 | OUTPATIENT
Start: 2024-09-04

## 2024-09-26 ENCOUNTER — HOSPITAL ENCOUNTER (OUTPATIENT)
Dept: RADIOLOGY | Facility: MEDICAL CENTER | Age: 69
End: 2024-09-26
Attending: PHYSICAL MEDICINE & REHABILITATION
Payer: MEDICARE

## 2024-09-26 DIAGNOSIS — M54.50 LOW BACK PAIN, UNSPECIFIED BACK PAIN LATERALITY, UNSPECIFIED CHRONICITY, UNSPECIFIED WHETHER SCIATICA PRESENT: ICD-10-CM

## 2024-09-26 PROCEDURE — 72148 MRI LUMBAR SPINE W/O DYE: CPT

## 2024-10-03 PROCEDURE — RXMED WILLOW AMBULATORY MEDICATION CHARGE: Performed by: FAMILY MEDICINE

## 2024-10-03 PROCEDURE — RXMED WILLOW AMBULATORY MEDICATION CHARGE

## 2024-10-04 ENCOUNTER — PHARMACY VISIT (OUTPATIENT)
Dept: PHARMACY | Facility: MEDICAL CENTER | Age: 69
End: 2024-10-04
Payer: COMMERCIAL

## 2024-10-04 PROCEDURE — RXMED WILLOW AMBULATORY MEDICATION CHARGE: Performed by: PHYSICAL MEDICINE & REHABILITATION

## 2024-10-08 PROCEDURE — RXMED WILLOW AMBULATORY MEDICATION CHARGE

## 2024-10-09 ENCOUNTER — PHARMACY VISIT (OUTPATIENT)
Dept: PHARMACY | Facility: MEDICAL CENTER | Age: 69
End: 2024-10-09
Payer: COMMERCIAL

## 2024-10-30 ENCOUNTER — PHARMACY VISIT (OUTPATIENT)
Dept: PHARMACY | Facility: MEDICAL CENTER | Age: 69
End: 2024-10-30
Payer: COMMERCIAL

## 2024-10-30 PROCEDURE — RXMED WILLOW AMBULATORY MEDICATION CHARGE: Performed by: PHYSICAL MEDICINE & REHABILITATION

## 2024-11-04 DIAGNOSIS — L30.9 DERMATITIS: ICD-10-CM

## 2024-11-04 PROCEDURE — RXMED WILLOW AMBULATORY MEDICATION CHARGE: Performed by: FAMILY MEDICINE

## 2024-11-05 PROCEDURE — RXMED WILLOW AMBULATORY MEDICATION CHARGE: Performed by: FAMILY MEDICINE

## 2024-11-05 RX ORDER — BETAMETHASONE DIPROPIONATE 0.5 MG/G
1 OINTMENT, AUGMENTED TOPICAL 2 TIMES DAILY
Qty: 50 G | Refills: 2 | Status: SHIPPED | OUTPATIENT
Start: 2024-11-05

## 2024-11-05 NOTE — TELEPHONE ENCOUNTER
Received request via: Pharmacy    Was the patient seen in the last year in this department? Yes    Does the patient have an active prescription (recently filled or refills available) for medication(s) requested? No    [facility]    Does the patient have group home Plus and need 100-day supply? (This applies to ALL medications) Patient does not have SCP

## 2024-11-06 ENCOUNTER — PHARMACY VISIT (OUTPATIENT)
Dept: PHARMACY | Facility: MEDICAL CENTER | Age: 69
End: 2024-11-06
Payer: COMMERCIAL

## 2024-11-25 PROCEDURE — RXMED WILLOW AMBULATORY MEDICATION CHARGE

## 2024-11-26 PROCEDURE — RXMED WILLOW AMBULATORY MEDICATION CHARGE: Performed by: PHYSICAL MEDICINE & REHABILITATION

## 2024-11-27 ENCOUNTER — PHARMACY VISIT (OUTPATIENT)
Dept: PHARMACY | Facility: MEDICAL CENTER | Age: 69
End: 2024-11-27
Payer: COMMERCIAL

## 2024-12-04 ENCOUNTER — PHARMACY VISIT (OUTPATIENT)
Dept: PHARMACY | Facility: MEDICAL CENTER | Age: 69
End: 2024-12-04
Payer: COMMERCIAL

## 2024-12-10 ENCOUNTER — OFFICE VISIT (OUTPATIENT)
Dept: MEDICAL GROUP | Facility: MEDICAL CENTER | Age: 69
End: 2024-12-10
Attending: FAMILY MEDICINE
Payer: MEDICARE

## 2024-12-10 VITALS
SYSTOLIC BLOOD PRESSURE: 140 MMHG | BODY MASS INDEX: 32.89 KG/M2 | HEIGHT: 68 IN | TEMPERATURE: 98.3 F | RESPIRATION RATE: 16 BRPM | HEART RATE: 60 BPM | WEIGHT: 217 LBS | DIASTOLIC BLOOD PRESSURE: 78 MMHG | OXYGEN SATURATION: 95 %

## 2024-12-10 DIAGNOSIS — Z12.12 SCREENING FOR COLORECTAL CANCER: ICD-10-CM

## 2024-12-10 DIAGNOSIS — R09.81 CHRONIC NASAL CONGESTION: ICD-10-CM

## 2024-12-10 DIAGNOSIS — M25.541 ARTHRALGIA OF BOTH HANDS: ICD-10-CM

## 2024-12-10 DIAGNOSIS — Z79.899 MEDICATION MANAGEMENT: ICD-10-CM

## 2024-12-10 DIAGNOSIS — Z12.11 SCREENING FOR COLORECTAL CANCER: ICD-10-CM

## 2024-12-10 DIAGNOSIS — M25.542 ARTHRALGIA OF BOTH HANDS: ICD-10-CM

## 2024-12-10 DIAGNOSIS — K13.0 LIP LESION: ICD-10-CM

## 2024-12-10 PROCEDURE — 3078F DIAST BP <80 MM HG: CPT | Performed by: FAMILY MEDICINE

## 2024-12-10 PROCEDURE — 3077F SYST BP >= 140 MM HG: CPT | Performed by: FAMILY MEDICINE

## 2024-12-10 PROCEDURE — 99213 OFFICE O/P EST LOW 20 MIN: CPT | Performed by: FAMILY MEDICINE

## 2024-12-10 PROCEDURE — 99214 OFFICE O/P EST MOD 30 MIN: CPT | Performed by: FAMILY MEDICINE

## 2024-12-10 PROCEDURE — RXMED WILLOW AMBULATORY MEDICATION CHARGE: Performed by: FAMILY MEDICINE

## 2024-12-10 RX ORDER — FLUTICASONE PROPIONATE 50 MCG
1 SPRAY, SUSPENSION (ML) NASAL DAILY
Qty: 16 G | Refills: 0 | Status: SHIPPED | OUTPATIENT
Start: 2024-12-10

## 2024-12-10 NOTE — PROGRESS NOTES
Verbal consent was acquired by the patient to use [employer] ambient listening note generation during this visit.    Subjective   Chief Complaint   Patient presents with    Follow-Up        HPI:   Allen presents today with    History of Present Illness  The patient is a 69-year-old male who presents for evaluation of lip lesion, hand pain, nasal congestion, and medication management.    He reports the development of a lip lesion approximately 3 months ago, which initially resembled a deep paper cut. The lesion persisted for several weeks, during which it appeared to scab over but remained tender to touch. Certain movements, such as smiling or specific lip motions, cause the lesion to split open, preventing complete healing. This is his first experience with such a condition. He has previously consulted with Dr. Stuart Cat for an ear lesion, which was diagnosed as benign squamous cell carcinoma and successfully treated.    He also reports increased pain in his knuckles, particularly in his left hand, which he attributes to arthritis. He has noticed the development of nodules on his right hand over the past few months. Despite the pain, he maintains normal functionality, although with some discomfort. He has a 22-month-old dog that frequently applies pressure to his hands, resulting in blood splotches. He sustained a wrist fracture on [facility], which has not fully healed. He reports no swelling or severe pain, apart from the arthritis, which has been progressively worsening over the years.    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 Over the past month, he has been experiencing severe nasal congestion upon waking, leading to dry mouth. He has attempted various remedies, including sleeping with the window open or closed and using a humidifier, but these have not provided relief. He reports no breathing difficulties during the day. He typically sleeps on  "his side and wakes up with progressive nasal congestion, which becomes severe by 2 or 3 in the morning, preventing him from passing air through his nose. He used cocaine from age 19 to 37 but reports no history of intravenous drug use.    He is currently under the care of Dr. Villagran at the [facility] for pain management. He has discontinued gabapentin and meloxicam and is no longer taking Percocet, which was only used during the pandemic. He continues to take Norco 10 mg and uses tizanidine as a muscle relaxant and sleep aid. He is not taking QLipo due to insurance coverage issues. He is also on diltiazem, triamterene, and atorvastatin. He does not require refills at this time. He continues to use betamethasone ointment and erythromycin ointment prescribed by his ophthalmologist, Dr. Janet Goddard.    SOCIAL HISTORY  The patient has a history of being a heavy cocaine user from age 19 to 37 but reports no history of intravenous drug use.    FAMILY HISTORY  The patient's mother had arthritis.    MEDICATIONS  Current: Norco, tizanidine, diltiazem, triamterene, atorvastatin, betamethasone ointment, erythromycin ointment  Discontinued: gabapentin, meloxicam, Percocet    IMMUNIZATIONS  The patient declined the influenza vaccine.        Health Maintenance Due   Topic Date Due    Zoster (Shingles) Vaccines (1 of 2) Never done    Pneumococcal Vaccine: 65+ Years (1 of 1 - PCV) Never done    Annual Wellness Visit  04/21/2024    Influenza Vaccine (1) Never done    COVID-19 Vaccine (1 - 2024-25 season) Never done    Colorectal Cancer Screening  01/05/2025       Objective   Social History     Tobacco Use    Smoking status: Never    Smokeless tobacco: Never   Substance Use Topics    Alcohol use: Yes     Alcohol/week: 0.0 oz     Comment: rare    Drug use: No     Comment: past drug use       Exam:  BP (!) 140/78   Pulse 60   Temp 36.8 °C (98.3 °F)   Resp 16   Ht 1.727 m (5' 7.99\")   Wt 98.4 kg (217 lb)   SpO2 95%   " BMI 33.00 kg/m²     Physical Exam  Constitutional: Alert, no distress  Skin:       Eye: Conjunctiva clear, lids normal  Respiratory: Unlabored respiratory effort, no cough  MSK: Normal gait, moves all extremities              Psych: Alert and oriented x3, normal affect and mood    Allergies   Allergen Reactions    Nkda [No Known Drug Allergy]     Zofran [Dextrose-Ondansetron] Rash     Diffuse rash       [address] [address]  Phone: [phone] Fax: [phone]    Current Outpatient Medications   Medication Sig Dispense Refill    fluticasone (FLONASE) 50 MCG/ACT nasal spray Administer 1 Spray into affected nostril(S) every day. 16 g 0    augmented betamethasone dipropionate (DIPROLENE-AF) 0.05 % ointment Apply 1 application topically 2 times a day. 50 g 2    HYDROcodone/acetaminophen (NORCO)  MG Tab Take 1 tablet 5 times a day by oral route as needed for 28 days. *DNFT 11/27/24* 140 Tablet 0    HYDROcodone/acetaminophen (NORCO)  MG Tab Take 1 tablet 5 times a day by oral route as needed for 28 days. *DNFT 10/30/24* 140 Tablet 0    HYDROcodone/acetaminophen (NORCO)  MG Tab Take 1 tablet 5 times a day by oral route as needed for 26 days. *DNFT 10/4/24* 130 Tablet 0    omeprazole (PRILOSEC) 20 MG delayed-release capsule Take 1 capsule by mouth every day. 90 Capsule 3    atorvastatin (LIPITOR) 20 MG Tab Take 1 Tablet by mouth every evening. 100 Tablet 3    dilTIAZem CD (CARDIZEM CD) 300 MG CAPSULE SR 24 HR TAKE 1 CAPSULE ORALLY ONCE DAILY 100 Capsule 3    triamterene-hctz (MAXZIDE-25/DYAZIDE) 37.5-25 MG Tab TAKE 1 TABLET ORALLY ONCE DAILY 100 Tablet 3    tizanidine (ZANAFLEX) 4 MG Tab Take 1 Tablet by mouth every 6 hours as needed (Muscle Spasms). 60 Tablet 3    Lidocaine, Anorectal, 5 % Cream 1 Gram Four Times A Day as needed for pain 120 g 1    erythromycin 5 MG/GM Ointment Apply a small amount to both eyes once a day at bed time. 3.5 g 4     No current  facility-administered medications for this visit.       Assessment & Plan    69 y.o. male with the following -   1. Lip lesion  Referral to Plastic Surgery      2. Chronic nasal congestion  fluticasone (FLONASE) 50 MCG/ACT nasal spray      3. Medication management        4. Arthralgia of both hands  DX-JOINT SURVEY-HANDS SINGLE VIEW    OSWALD W/REFLEX IF POSITIVE      5. Screening for colorectal cancer  Referral to GI for Colonoscopy        Assessment & Plan  1. Lip lesion.  The lip lesion has persisted for over three months, presenting as a deep paper cut that is tender and prone to splitting open with certain movements. A referral to Dr. Cat's office has been initiated for further evaluation to rule out malignancy.    2. Hand pain.  The hand pain, particularly in the knuckles, is likely due to osteoarthritis, given the presence of Heberden's or Hu's nodes. Rheumatoid arthritis is less probable considering his age of 69. An autoimmune screen will be conducted to exclude autoimmune causes for the joint pain. Additionally, an x-ray of both hands will be obtained to assess the extent of any joint damage. Depending on the results, a referral to a rheumatologist may be considered.    3. Nasal congestion.  The nasal congestion could be attributed to positional and gravitational factors, as well as mouth breathing during sleep. A prescription for Flonase nasal spray has been provided to alleviate the congestion. A CT scan of the face will be ordered to evaluate for any structural damage. He has been advised to elevate his head while sleeping to facilitate mucus drainage. If the nasal steroid spray proves effective, there will be no need for a specialist consultation. However, if it does not work, the ENT specialist will have more information to work with.    4. Medication management.  Gabapentin, meloxicam, and QLipo have been discontinued. Norco 10 mg and tizanidine will be continued. Diltiazem, triamterene,  atorvastatin, and antacid will be continued. Betamethasone ointment and erythromycin ointment will be continued.    5. Health maintenance.  He is due for a colonoscopy, with the last one performed in 2015. A referral for a consultation regarding the procedure has been placed.    Follow-up  The patient will follow up in 3 months.    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      Return in about 3 months (around 3/10/2025), or if symptoms worsen or fail to improve, for chronic condition follow up.    Please note that this dictation was created using voice recognition software. I have made every reasonable attempt to correct obvious errors, but I expect that there are errors of grammar and possibly content that I did not discover before finalizing the note.

## 2024-12-12 ENCOUNTER — PHARMACY VISIT (OUTPATIENT)
Dept: PHARMACY | Facility: MEDICAL CENTER | Age: 69
End: 2024-12-12
Payer: COMMERCIAL

## 2024-12-19 ENCOUNTER — HOSPITAL ENCOUNTER (OUTPATIENT)
Dept: LAB | Facility: MEDICAL CENTER | Age: 69
End: 2024-12-19
Attending: FAMILY MEDICINE
Payer: MEDICARE

## 2024-12-19 PROCEDURE — 86038 ANTINUCLEAR ANTIBODIES: CPT

## 2024-12-19 PROCEDURE — 36415 COLL VENOUS BLD VENIPUNCTURE: CPT

## 2024-12-20 LAB — NUCLEAR IGG SER QL IA: NORMAL

## 2024-12-23 ENCOUNTER — PHARMACY VISIT (OUTPATIENT)
Dept: PHARMACY | Facility: MEDICAL CENTER | Age: 69
End: 2024-12-23
Payer: COMMERCIAL

## 2024-12-23 PROCEDURE — RXMED WILLOW AMBULATORY MEDICATION CHARGE: Performed by: PHYSICAL MEDICINE & REHABILITATION

## 2025-01-02 DIAGNOSIS — R09.81 CHRONIC NASAL CONGESTION: ICD-10-CM

## 2025-01-02 PROCEDURE — RXMED WILLOW AMBULATORY MEDICATION CHARGE: Performed by: FAMILY MEDICINE

## 2025-01-03 PROCEDURE — RXMED WILLOW AMBULATORY MEDICATION CHARGE: Performed by: FAMILY MEDICINE

## 2025-01-06 ENCOUNTER — PHARMACY VISIT (OUTPATIENT)
Dept: PHARMACY | Facility: MEDICAL CENTER | Age: 70
End: 2025-01-06
Payer: COMMERCIAL

## 2025-01-06 PROCEDURE — RXMED WILLOW AMBULATORY MEDICATION CHARGE

## 2025-01-07 ENCOUNTER — HOSPITAL ENCOUNTER (OUTPATIENT)
Dept: RADIOLOGY | Facility: MEDICAL CENTER | Age: 70
End: 2025-01-07
Attending: FAMILY MEDICINE
Payer: MEDICARE

## 2025-01-07 DIAGNOSIS — M25.542 ARTHRALGIA OF BOTH HANDS: ICD-10-CM

## 2025-01-07 DIAGNOSIS — M25.541 ARTHRALGIA OF BOTH HANDS: ICD-10-CM

## 2025-01-07 PROCEDURE — 77077 JOINT SURVEY SINGLE VIEW: CPT

## 2025-01-07 RX ORDER — FLUTICASONE PROPIONATE 50 MCG
1 SPRAY, SUSPENSION (ML) NASAL DAILY
Qty: 16 G | Refills: 5 | Status: SHIPPED | OUTPATIENT
Start: 2025-01-07

## 2025-01-08 PROCEDURE — RXMED WILLOW AMBULATORY MEDICATION CHARGE: Performed by: FAMILY MEDICINE

## 2025-01-08 NOTE — TELEPHONE ENCOUNTER
Received request via: Pharmacy    Was the patient seen in the last year in this department? Yes    Does the patient have an active prescription (recently filled or refills available) for medication(s) requested? No    Pharmacy Name: [facility]    Does the patient have assisted Plus and need 100-day supply? (This applies to ALL medications) Patient does not have SCP

## 2025-01-10 ENCOUNTER — PHARMACY VISIT (OUTPATIENT)
Dept: PHARMACY | Facility: MEDICAL CENTER | Age: 70
End: 2025-01-10
Payer: COMMERCIAL

## 2025-01-20 DIAGNOSIS — M54.2 NECK PAIN OF OVER 3 MONTHS DURATION: ICD-10-CM

## 2025-01-21 PROCEDURE — RXMED WILLOW AMBULATORY MEDICATION CHARGE: Performed by: FAMILY MEDICINE

## 2025-01-21 NOTE — TELEPHONE ENCOUNTER
Received request via: Pharmacy    Was the patient seen in the last year in this department? Yes    Does the patient have an active prescription (recently filled or refills available) for medication(s) requested? No    [facility]    Does the patient have [facility] Plus and need 100-day supply? (This applies to ALL medications) Patient does not have SCP

## 2025-01-22 ENCOUNTER — PHARMACY VISIT (OUTPATIENT)
Dept: PHARMACY | Facility: MEDICAL CENTER | Age: 70
End: 2025-01-22
Payer: COMMERCIAL

## 2025-01-22 PROCEDURE — RXMED WILLOW AMBULATORY MEDICATION CHARGE: Performed by: PHYSICAL MEDICINE & REHABILITATION

## 2025-01-31 ENCOUNTER — HOSPITAL ENCOUNTER (OUTPATIENT)
Facility: MEDICAL CENTER | Age: 70
End: 2025-01-31
Attending: PLASTIC SURGERY
Payer: MEDICARE

## 2025-01-31 PROCEDURE — 88312 SPECIAL STAINS GROUP 1: CPT | Mod: 26 | Performed by: PATHOLOGY

## 2025-01-31 PROCEDURE — 88341 IMHCHEM/IMCYTCHM EA ADD ANTB: CPT | Mod: 26 | Performed by: PATHOLOGY

## 2025-01-31 PROCEDURE — 88342 IMHCHEM/IMCYTCHM 1ST ANTB: CPT | Performed by: PATHOLOGY

## 2025-01-31 PROCEDURE — 88305 TISSUE EXAM BY PATHOLOGIST: CPT | Mod: 26 | Performed by: PATHOLOGY

## 2025-01-31 PROCEDURE — 88305 TISSUE EXAM BY PATHOLOGIST: CPT | Performed by: PATHOLOGY

## 2025-01-31 PROCEDURE — 88341 IMHCHEM/IMCYTCHM EA ADD ANTB: CPT | Performed by: PATHOLOGY

## 2025-01-31 PROCEDURE — 88312 SPECIAL STAINS GROUP 1: CPT | Mod: 59 | Performed by: PATHOLOGY

## 2025-01-31 PROCEDURE — 88342 IMHCHEM/IMCYTCHM 1ST ANTB: CPT | Mod: 26 | Performed by: PATHOLOGY

## 2025-02-03 LAB — PATHOLOGY CONSULT NOTE: NORMAL

## 2025-02-04 DIAGNOSIS — I10 ESSENTIAL HYPERTENSION: ICD-10-CM

## 2025-02-04 PROCEDURE — RXMED WILLOW AMBULATORY MEDICATION CHARGE: Performed by: FAMILY MEDICINE

## 2025-02-05 PROCEDURE — RXMED WILLOW AMBULATORY MEDICATION CHARGE: Performed by: FAMILY MEDICINE

## 2025-02-05 RX ORDER — TRIAMTERENE AND HYDROCHLOROTHIAZIDE 37.5; 25 MG/1; MG/1
TABLET ORAL
Qty: 90 TABLET | Refills: 3 | Status: SHIPPED | OUTPATIENT
Start: 2025-02-05

## 2025-02-07 ENCOUNTER — PHARMACY VISIT (OUTPATIENT)
Dept: PHARMACY | Facility: MEDICAL CENTER | Age: 70
End: 2025-02-07
Payer: COMMERCIAL

## 2025-02-19 ENCOUNTER — PHARMACY VISIT (OUTPATIENT)
Dept: PHARMACY | Facility: MEDICAL CENTER | Age: 70
End: 2025-02-19
Payer: COMMERCIAL

## 2025-02-19 PROCEDURE — RXMED WILLOW AMBULATORY MEDICATION CHARGE: Performed by: PHYSICAL MEDICINE & REHABILITATION

## 2025-03-11 PROCEDURE — RXMED WILLOW AMBULATORY MEDICATION CHARGE: Performed by: FAMILY MEDICINE

## 2025-03-14 ENCOUNTER — PHARMACY VISIT (OUTPATIENT)
Dept: PHARMACY | Facility: MEDICAL CENTER | Age: 70
End: 2025-03-14
Payer: COMMERCIAL

## 2025-03-18 ENCOUNTER — PHARMACY VISIT (OUTPATIENT)
Dept: PHARMACY | Facility: MEDICAL CENTER | Age: 70
End: 2025-03-18
Payer: COMMERCIAL

## 2025-03-18 PROCEDURE — RXMED WILLOW AMBULATORY MEDICATION CHARGE: Performed by: PHYSICAL MEDICINE & REHABILITATION

## 2025-04-07 DIAGNOSIS — I10 ESSENTIAL HYPERTENSION: ICD-10-CM

## 2025-04-08 PROCEDURE — RXMED WILLOW AMBULATORY MEDICATION CHARGE

## 2025-04-08 RX ORDER — DILTIAZEM HYDROCHLORIDE 300 MG/1
CAPSULE, COATED, EXTENDED RELEASE ORAL
Qty: 90 CAPSULE | Refills: 0 | Status: SHIPPED | OUTPATIENT
Start: 2025-04-08

## 2025-04-11 ENCOUNTER — PHARMACY VISIT (OUTPATIENT)
Dept: PHARMACY | Facility: MEDICAL CENTER | Age: 70
End: 2025-04-11
Payer: COMMERCIAL

## 2025-04-16 ENCOUNTER — PHARMACY VISIT (OUTPATIENT)
Dept: PHARMACY | Facility: MEDICAL CENTER | Age: 70
End: 2025-04-16
Payer: COMMERCIAL

## 2025-04-16 PROCEDURE — RXMED WILLOW AMBULATORY MEDICATION CHARGE: Performed by: PHYSICAL MEDICINE & REHABILITATION

## 2025-05-02 DIAGNOSIS — E78.2 MIXED HYPERLIPIDEMIA: ICD-10-CM

## 2025-05-02 PROCEDURE — RXMED WILLOW AMBULATORY MEDICATION CHARGE: Performed by: FAMILY MEDICINE

## 2025-05-07 PROCEDURE — RXMED WILLOW AMBULATORY MEDICATION CHARGE: Performed by: FAMILY MEDICINE

## 2025-05-07 RX ORDER — ATORVASTATIN CALCIUM 20 MG/1
20 TABLET, FILM COATED ORAL NIGHTLY
Qty: 90 TABLET | Refills: 3 | Status: SHIPPED | OUTPATIENT
Start: 2025-05-07

## 2025-05-07 NOTE — TELEPHONE ENCOUNTER
Pharmacy stating needing 100 day supply    Received request via: Pharmacy    Was the patient seen in the last year in this department? Yes    Does the patient have an active prescription (recently filled or refills available) for medication(s) requested? No    Pharmacy Name: [facility] [place]    Does the patient have assisted Plus and need 100-day supply? (This applies to ALL medications) Patient does not have SCP

## 2025-05-09 ENCOUNTER — PHARMACY VISIT (OUTPATIENT)
Dept: PHARMACY | Facility: MEDICAL CENTER | Age: 70
End: 2025-05-09
Payer: COMMERCIAL

## 2025-05-14 ENCOUNTER — PHARMACY VISIT (OUTPATIENT)
Dept: PHARMACY | Facility: MEDICAL CENTER | Age: 70
End: 2025-05-14
Payer: COMMERCIAL

## 2025-05-14 PROCEDURE — RXMED WILLOW AMBULATORY MEDICATION CHARGE: Performed by: PHYSICAL MEDICINE & REHABILITATION

## 2025-06-09 DIAGNOSIS — L30.9 DERMATITIS: ICD-10-CM

## 2025-06-09 PROCEDURE — RXMED WILLOW AMBULATORY MEDICATION CHARGE: Performed by: FAMILY MEDICINE

## 2025-06-09 RX ORDER — BETAMETHASONE DIPROPIONATE 0.5 MG/G
1 OINTMENT, AUGMENTED TOPICAL 2 TIMES DAILY
Qty: 50 G | Refills: 2 | Status: SHIPPED | OUTPATIENT
Start: 2025-06-09

## 2025-06-09 NOTE — TELEPHONE ENCOUNTER
Received request via: Patient    Was the patient seen in the last year in this department? Yes    Does the patient have an active prescription (recently filled or refills available) for medication(s) requested? No    Pharmacy Name: [facility] [place]    Does the patient have [facility] Plus and need 100-day supply? (This applies to ALL medications) Patient does not have SCP

## 2025-06-11 ENCOUNTER — PHARMACY VISIT (OUTPATIENT)
Dept: PHARMACY | Facility: MEDICAL CENTER | Age: 70
End: 2025-06-11
Payer: COMMERCIAL

## 2025-06-11 PROCEDURE — RXMED WILLOW AMBULATORY MEDICATION CHARGE: Performed by: PHYSICAL MEDICINE & REHABILITATION

## 2025-06-13 ENCOUNTER — PHARMACY VISIT (OUTPATIENT)
Dept: PHARMACY | Facility: MEDICAL CENTER | Age: 70
End: 2025-06-13
Payer: COMMERCIAL

## 2025-07-02 DIAGNOSIS — I10 ESSENTIAL HYPERTENSION: ICD-10-CM

## 2025-07-02 PROCEDURE — RXMED WILLOW AMBULATORY MEDICATION CHARGE: Performed by: FAMILY MEDICINE

## 2025-07-03 PROCEDURE — RXMED WILLOW AMBULATORY MEDICATION CHARGE: Performed by: FAMILY MEDICINE

## 2025-07-03 RX ORDER — DILTIAZEM HYDROCHLORIDE 300 MG/1
CAPSULE, COATED, EXTENDED RELEASE ORAL
Qty: 90 CAPSULE | Refills: 0 | Status: SHIPPED | OUTPATIENT
Start: 2025-07-03

## 2025-07-03 NOTE — TELEPHONE ENCOUNTER
Received request via: Pharmacy    Was the patient seen in the last year in this department? Yes    Does the patient have an active prescription (recently filled or refills available) for medication(s) requested? No    Pharmacy Name: [facility]     Does the patient have detention Plus and need 100-day supply? (This applies to ALL medications) Patient does not have SCP

## 2025-07-09 ENCOUNTER — PHARMACY VISIT (OUTPATIENT)
Dept: PHARMACY | Facility: MEDICAL CENTER | Age: 70
End: 2025-07-09
Payer: COMMERCIAL

## 2025-07-09 PROCEDURE — RXMED WILLOW AMBULATORY MEDICATION CHARGE: Performed by: PHYSICAL MEDICINE & REHABILITATION

## 2025-07-21 NOTE — TELEPHONE ENCOUNTER
Left voice mail message advising patient that the [facility] schedule is open for November and they are due to see pulmonary provider, Nicolas Nuñez. Requested they call back to schedule appointment [phone].    Received request via: Patient     Was the patient seen in the last year in this department? Yes    Does the patient have an active prescription (recently filled or refills available) for medication(s) requested? No

## 2025-08-05 PROCEDURE — RXMED WILLOW AMBULATORY MEDICATION CHARGE: Performed by: FAMILY MEDICINE

## 2025-08-06 ENCOUNTER — PHARMACY VISIT (OUTPATIENT)
Dept: PHARMACY | Facility: MEDICAL CENTER | Age: 70
End: 2025-08-06
Payer: COMMERCIAL

## 2025-08-06 PROCEDURE — RXMED WILLOW AMBULATORY MEDICATION CHARGE: Performed by: PHYSICAL MEDICINE & REHABILITATION

## 2025-08-08 ENCOUNTER — PHARMACY VISIT (OUTPATIENT)
Dept: PHARMACY | Facility: MEDICAL CENTER | Age: 70
End: 2025-08-08

## 2025-08-13 ENCOUNTER — HOSPITAL ENCOUNTER (OUTPATIENT)
Facility: MEDICAL CENTER | Age: 70
End: 2025-08-13
Attending: FAMILY MEDICINE
Payer: MEDICARE

## 2025-08-13 ENCOUNTER — PHARMACY VISIT (OUTPATIENT)
Dept: PHARMACY | Facility: MEDICAL CENTER | Age: 70
End: 2025-08-13
Payer: COMMERCIAL

## 2025-08-13 ENCOUNTER — OFFICE VISIT (OUTPATIENT)
Dept: MEDICAL GROUP | Facility: MEDICAL CENTER | Age: 70
End: 2025-08-13
Attending: FAMILY MEDICINE
Payer: MEDICARE

## 2025-08-13 VITALS
OXYGEN SATURATION: 96 % | RESPIRATION RATE: 16 BRPM | BODY MASS INDEX: 31.67 KG/M2 | DIASTOLIC BLOOD PRESSURE: 78 MMHG | HEIGHT: 68 IN | HEART RATE: 76 BPM | WEIGHT: 209 LBS | TEMPERATURE: 98.5 F | SYSTOLIC BLOOD PRESSURE: 138 MMHG

## 2025-08-13 DIAGNOSIS — L03.116 CELLULITIS OF LEFT LOWER EXTREMITY: ICD-10-CM

## 2025-08-13 DIAGNOSIS — L03.116 CELLULITIS OF LEFT LOWER EXTREMITY: Primary | ICD-10-CM

## 2025-08-13 DIAGNOSIS — Z23 NEED FOR VACCINATION: ICD-10-CM

## 2025-08-13 PROCEDURE — 99213 OFFICE O/P EST LOW 20 MIN: CPT | Mod: 25 | Performed by: FAMILY MEDICINE

## 2025-08-13 PROCEDURE — 87077 CULTURE AEROBIC IDENTIFY: CPT

## 2025-08-13 PROCEDURE — 3075F SYST BP GE 130 - 139MM HG: CPT | Performed by: FAMILY MEDICINE

## 2025-08-13 PROCEDURE — 87186 SC STD MICRODIL/AGAR DIL: CPT

## 2025-08-13 PROCEDURE — 87205 SMEAR GRAM STAIN: CPT

## 2025-08-13 PROCEDURE — 90471 IMMUNIZATION ADMIN: CPT

## 2025-08-13 PROCEDURE — 87070 CULTURE OTHR SPECIMN AEROBIC: CPT

## 2025-08-13 PROCEDURE — RXMED WILLOW AMBULATORY MEDICATION CHARGE: Performed by: FAMILY MEDICINE

## 2025-08-13 PROCEDURE — 99214 OFFICE O/P EST MOD 30 MIN: CPT | Performed by: FAMILY MEDICINE

## 2025-08-13 PROCEDURE — 3078F DIAST BP <80 MM HG: CPT | Performed by: FAMILY MEDICINE

## 2025-08-13 RX ORDER — SULFAMETHOXAZOLE AND TRIMETHOPRIM 800; 160 MG/1; MG/1
1 TABLET ORAL 2 TIMES DAILY
Qty: 14 TABLET | Refills: 0 | Status: SHIPPED | OUTPATIENT
Start: 2025-08-13 | End: 2025-08-18 | Stop reason: SINTOL

## 2025-08-13 ASSESSMENT — PATIENT HEALTH QUESTIONNAIRE - PHQ9: CLINICAL INTERPRETATION OF PHQ2 SCORE: 0

## 2025-08-14 LAB
GRAM STN SPEC: NORMAL
SIGNIFICANT IND 70042: NORMAL
SITE SITE: NORMAL
SOURCE SOURCE: NORMAL

## 2025-08-16 LAB
BACTERIA WND AEROBE CULT: ABNORMAL
BACTERIA WND AEROBE CULT: ABNORMAL
GRAM STN SPEC: ABNORMAL
SIGNIFICANT IND 70042: ABNORMAL
SITE SITE: ABNORMAL
SOURCE SOURCE: ABNORMAL

## 2025-08-18 ENCOUNTER — RESULTS FOLLOW-UP (OUTPATIENT)
Dept: MEDICAL GROUP | Facility: MEDICAL CENTER | Age: 70
End: 2025-08-18
Payer: MEDICARE

## 2025-08-18 ENCOUNTER — OFFICE VISIT (OUTPATIENT)
Dept: MEDICAL GROUP | Facility: MEDICAL CENTER | Age: 70
End: 2025-08-18
Attending: STUDENT IN AN ORGANIZED HEALTH CARE EDUCATION/TRAINING PROGRAM
Payer: MEDICARE

## 2025-08-18 ENCOUNTER — PHARMACY VISIT (OUTPATIENT)
Dept: PHARMACY | Facility: MEDICAL CENTER | Age: 70
End: 2025-08-18
Payer: COMMERCIAL

## 2025-08-18 VITALS
DIASTOLIC BLOOD PRESSURE: 82 MMHG | RESPIRATION RATE: 16 BRPM | HEART RATE: 75 BPM | SYSTOLIC BLOOD PRESSURE: 134 MMHG | BODY MASS INDEX: 31.16 KG/M2 | TEMPERATURE: 99.2 F | WEIGHT: 205.6 LBS | OXYGEN SATURATION: 95 % | HEIGHT: 68 IN

## 2025-08-18 DIAGNOSIS — L03.116 CELLULITIS OF LEFT LOWER EXTREMITY: Primary | ICD-10-CM

## 2025-08-18 PROCEDURE — 3075F SYST BP GE 130 - 139MM HG: CPT | Performed by: STUDENT IN AN ORGANIZED HEALTH CARE EDUCATION/TRAINING PROGRAM

## 2025-08-18 PROCEDURE — RXMED WILLOW AMBULATORY MEDICATION CHARGE: Performed by: STUDENT IN AN ORGANIZED HEALTH CARE EDUCATION/TRAINING PROGRAM

## 2025-08-18 PROCEDURE — 3079F DIAST BP 80-89 MM HG: CPT | Performed by: STUDENT IN AN ORGANIZED HEALTH CARE EDUCATION/TRAINING PROGRAM

## 2025-08-18 PROCEDURE — 99212 OFFICE O/P EST SF 10 MIN: CPT | Performed by: STUDENT IN AN ORGANIZED HEALTH CARE EDUCATION/TRAINING PROGRAM

## 2025-08-18 PROCEDURE — 99214 OFFICE O/P EST MOD 30 MIN: CPT | Performed by: STUDENT IN AN ORGANIZED HEALTH CARE EDUCATION/TRAINING PROGRAM

## 2025-08-18 RX ORDER — CLINDAMYCIN HYDROCHLORIDE 300 MG/1
300 CAPSULE ORAL 4 TIMES DAILY
Qty: 20 CAPSULE | Refills: 0 | Status: SHIPPED | OUTPATIENT
Start: 2025-08-18 | End: 2025-08-23

## 2025-08-19 ENCOUNTER — PHARMACY VISIT (OUTPATIENT)
Dept: PHARMACY | Facility: MEDICAL CENTER | Age: 70
End: 2025-08-19
Payer: COMMERCIAL

## 2025-08-19 DIAGNOSIS — L03.116 CELLULITIS OF LEFT LOWER EXTREMITY: Primary | ICD-10-CM

## 2025-08-19 PROCEDURE — RXMED WILLOW AMBULATORY MEDICATION CHARGE: Performed by: STUDENT IN AN ORGANIZED HEALTH CARE EDUCATION/TRAINING PROGRAM

## 2025-08-19 RX ORDER — CEPHALEXIN 500 MG/1
500 CAPSULE ORAL 4 TIMES DAILY
Qty: 20 CAPSULE | Refills: 0 | Status: SHIPPED | OUTPATIENT
Start: 2025-08-19 | End: 2025-08-24

## 2025-08-20 ENCOUNTER — PHARMACY VISIT (OUTPATIENT)
Dept: PHARMACY | Facility: MEDICAL CENTER | Age: 70
End: 2025-08-20
Payer: COMMERCIAL

## 2025-08-20 ENCOUNTER — HOSPITAL ENCOUNTER (EMERGENCY)
Facility: MEDICAL CENTER | Age: 70
End: 2025-08-20
Attending: EMERGENCY MEDICINE
Payer: MEDICARE

## 2025-08-20 VITALS
HEIGHT: 68 IN | TEMPERATURE: 98 F | SYSTOLIC BLOOD PRESSURE: 162 MMHG | RESPIRATION RATE: 18 BRPM | HEART RATE: 88 BPM | WEIGHT: 201.94 LBS | DIASTOLIC BLOOD PRESSURE: 117 MMHG | OXYGEN SATURATION: 94 % | BODY MASS INDEX: 30.61 KG/M2

## 2025-08-20 DIAGNOSIS — I77.6 VASCULITIS (HCC): Primary | ICD-10-CM

## 2025-08-20 DIAGNOSIS — Z51.89 ENCOUNTER FOR WOUND RE-CHECK: ICD-10-CM

## 2025-08-20 LAB
ALBUMIN SERPL BCP-MCNC: 3.8 G/DL (ref 3.2–4.9)
ALBUMIN/GLOB SERPL: 1.3 G/DL
ALP SERPL-CCNC: 76 U/L (ref 30–99)
ALT SERPL-CCNC: 22 U/L (ref 2–50)
ANION GAP SERPL CALC-SCNC: 14 MMOL/L (ref 7–16)
ANISOCYTOSIS BLD QL SMEAR: ABNORMAL
APTT PPP: 26.9 SEC (ref 24.7–36)
AST SERPL-CCNC: 32 U/L (ref 12–45)
BASOPHILS # BLD AUTO: 0 % (ref 0–1.8)
BASOPHILS # BLD: 0 K/UL (ref 0–0.12)
BILIRUB SERPL-MCNC: 0.5 MG/DL (ref 0.1–1.5)
BUN SERPL-MCNC: 21 MG/DL (ref 8–22)
BURR CELLS BLD QL SMEAR: NORMAL
CALCIUM ALBUM COR SERPL-MCNC: 9.2 MG/DL (ref 8.5–10.5)
CALCIUM SERPL-MCNC: 9 MG/DL (ref 8.5–10.5)
CHLORIDE SERPL-SCNC: 93 MMOL/L (ref 96–112)
CO2 SERPL-SCNC: 23 MMOL/L (ref 20–33)
COMMENT NL1176: NORMAL
CREAT SERPL-MCNC: 0.84 MG/DL (ref 0.5–1.4)
CRP SERPL HS-MCNC: 4.3 MG/DL (ref 0–0.75)
EOSINOPHIL # BLD AUTO: 0.08 K/UL (ref 0–0.51)
EOSINOPHIL NFR BLD: 1.7 % (ref 0–6.9)
ERYTHROCYTE [DISTWIDTH] IN BLOOD BY AUTOMATED COUNT: 36.3 FL (ref 35.9–50)
ERYTHROCYTE [SEDIMENTATION RATE] IN BLOOD BY WESTERGREN METHOD: 19 MM/HOUR (ref 0–20)
GFR SERPLBLD CREATININE-BSD FMLA CKD-EPI: 94 ML/MIN/1.73 M 2
GLOBULIN SER CALC-MCNC: 3 G/DL (ref 1.9–3.5)
GLUCOSE SERPL-MCNC: 90 MG/DL (ref 65–99)
HCT VFR BLD AUTO: 43 % (ref 42–52)
HGB BLD-MCNC: 14.7 G/DL (ref 14–18)
INR PPP: 0.97 (ref 0.87–1.13)
LACTATE SERPL-SCNC: 1.5 MMOL/L (ref 0.5–2)
LYMPHOCYTES # BLD AUTO: 1.59 K/UL (ref 1–4.8)
LYMPHOCYTES NFR BLD: 32.5 % (ref 22–41)
MANUAL DIFF BLD: NORMAL
MCH RBC QN AUTO: 28.8 PG (ref 27–33)
MCHC RBC AUTO-ENTMCNC: 34.2 G/DL (ref 32.3–36.5)
MCV RBC AUTO: 84.3 FL (ref 81.4–97.8)
MICROCYTES BLD QL SMEAR: ABNORMAL
MONOCYTES # BLD AUTO: 0.5 K/UL (ref 0–0.85)
MONOCYTES NFR BLD AUTO: 10.5 % (ref 0–13.4)
MORPHOLOGY BLD-IMP: NORMAL
NEUTROPHILS # BLD AUTO: 2.71 K/UL (ref 1.82–7.42)
NEUTROPHILS NFR BLD: 54.4 % (ref 44–72)
NEUTS BAND NFR BLD MANUAL: 0.9 % (ref 0–10)
NRBC # BLD AUTO: 0 K/UL
NRBC BLD-RTO: 0 /100 WBC (ref 0–0.2)
PLATELET # BLD AUTO: 338 K/UL (ref 164–446)
PLATELET BLD QL SMEAR: NORMAL
PMV BLD AUTO: 9.3 FL (ref 9–12.9)
POIKILOCYTOSIS BLD QL SMEAR: NORMAL
POTASSIUM SERPL-SCNC: 3.3 MMOL/L (ref 3.6–5.5)
PROCALCITONIN SERPL-MCNC: 0.19 NG/ML
PROT SERPL-MCNC: 6.8 G/DL (ref 6–8.2)
PROTHROMBIN TIME: 13 SEC (ref 12–14.6)
RBC # BLD AUTO: 5.1 M/UL (ref 4.7–6.1)
RBC BLD AUTO: PRESENT
SODIUM SERPL-SCNC: 130 MMOL/L (ref 135–145)
VARIANT LYMPHS BLD QL SMEAR: NORMAL
WBC # BLD AUTO: 4.9 K/UL (ref 4.8–10.8)
WBC TOXIC VACUOLES BLD QL SMEAR: NORMAL

## 2025-08-20 PROCEDURE — 84145 PROCALCITONIN (PCT): CPT

## 2025-08-20 PROCEDURE — 85730 THROMBOPLASTIN TIME PARTIAL: CPT

## 2025-08-20 PROCEDURE — 80053 COMPREHEN METABOLIC PANEL: CPT

## 2025-08-20 PROCEDURE — 85652 RBC SED RATE AUTOMATED: CPT

## 2025-08-20 PROCEDURE — 700111 HCHG RX REV CODE 636 W/ 250 OVERRIDE (IP): Mod: JZ,UD,TB | Performed by: EMERGENCY MEDICINE

## 2025-08-20 PROCEDURE — 85610 PROTHROMBIN TIME: CPT

## 2025-08-20 PROCEDURE — 85027 COMPLETE CBC AUTOMATED: CPT

## 2025-08-20 PROCEDURE — 83605 ASSAY OF LACTIC ACID: CPT

## 2025-08-20 PROCEDURE — 86140 C-REACTIVE PROTEIN: CPT

## 2025-08-20 PROCEDURE — 36415 COLL VENOUS BLD VENIPUNCTURE: CPT

## 2025-08-20 PROCEDURE — 85007 BL SMEAR W/DIFF WBC COUNT: CPT

## 2025-08-20 PROCEDURE — RXMED WILLOW AMBULATORY MEDICATION CHARGE: Performed by: EMERGENCY MEDICINE

## 2025-08-20 PROCEDURE — 96375 TX/PRO/DX INJ NEW DRUG ADDON: CPT

## 2025-08-20 PROCEDURE — 96374 THER/PROPH/DIAG INJ IV PUSH: CPT

## 2025-08-20 PROCEDURE — 99284 EMERGENCY DEPT VISIT MOD MDM: CPT

## 2025-08-20 PROCEDURE — 87040 BLOOD CULTURE FOR BACTERIA: CPT | Mod: 91

## 2025-08-20 RX ORDER — PREDNISONE 10 MG/1
TABLET ORAL
Qty: 32 TABLET | Refills: 0 | Status: SHIPPED | OUTPATIENT
Start: 2025-08-20

## 2025-08-20 RX ORDER — DIPHENHYDRAMINE HYDROCHLORIDE 50 MG/ML
50 INJECTION, SOLUTION INTRAMUSCULAR; INTRAVENOUS ONCE
Status: COMPLETED | OUTPATIENT
Start: 2025-08-20 | End: 2025-08-20

## 2025-08-20 RX ORDER — PROCHLORPERAZINE EDISYLATE 5 MG/ML
10 INJECTION INTRAMUSCULAR; INTRAVENOUS ONCE
Status: COMPLETED | OUTPATIENT
Start: 2025-08-20 | End: 2025-08-20

## 2025-08-20 RX ORDER — METHYLPREDNISOLONE SODIUM SUCCINATE 125 MG/2ML
125 INJECTION, POWDER, LYOPHILIZED, FOR SOLUTION INTRAMUSCULAR; INTRAVENOUS ONCE
Status: COMPLETED | OUTPATIENT
Start: 2025-08-20 | End: 2025-08-20

## 2025-08-20 RX ADMIN — METHYLPREDNISOLONE SODIUM SUCCINATE 125 MG: 125 INJECTION, POWDER, FOR SOLUTION INTRAMUSCULAR; INTRAVENOUS at 12:11

## 2025-08-20 RX ADMIN — DIPHENHYDRAMINE HYDROCHLORIDE 50 MG: 50 INJECTION, SOLUTION INTRAMUSCULAR; INTRAVENOUS at 11:49

## 2025-08-20 RX ADMIN — PROCHLORPERAZINE EDISYLATE 10 MG: 5 INJECTION INTRAMUSCULAR; INTRAVENOUS at 11:50

## 2025-08-20 ASSESSMENT — LIFESTYLE VARIABLES: HOW OFTEN DO YOU HAVE A DRINK CONTAINING ALCOHOL: NEVER

## 2025-08-25 DIAGNOSIS — K21.9 GASTROESOPHAGEAL REFLUX DISEASE WITHOUT ESOPHAGITIS: ICD-10-CM

## 2025-08-25 DIAGNOSIS — M54.2 NECK PAIN OF OVER 3 MONTHS DURATION: ICD-10-CM

## 2025-08-25 LAB
BACTERIA BLD CULT: NORMAL
BACTERIA BLD CULT: NORMAL
SIGNIFICANT IND 70042: NORMAL
SIGNIFICANT IND 70042: NORMAL
SITE SITE: NORMAL
SITE SITE: NORMAL
SOURCE SOURCE: NORMAL
SOURCE SOURCE: NORMAL

## 2025-08-25 PROCEDURE — RXMED WILLOW AMBULATORY MEDICATION CHARGE: Performed by: FAMILY MEDICINE

## 2025-08-26 PROCEDURE — RXMED WILLOW AMBULATORY MEDICATION CHARGE: Performed by: FAMILY MEDICINE

## 2025-08-26 RX ORDER — OMEPRAZOLE 20 MG/1
20 CAPSULE, DELAYED RELEASE ORAL DAILY
Qty: 90 CAPSULE | Refills: 3 | Status: SHIPPED | OUTPATIENT
Start: 2025-08-26

## 2025-08-27 PROCEDURE — RXMED WILLOW AMBULATORY MEDICATION CHARGE: Performed by: FAMILY MEDICINE

## 2025-08-29 ENCOUNTER — PHARMACY VISIT (OUTPATIENT)
Dept: PHARMACY | Facility: MEDICAL CENTER | Age: 70
End: 2025-08-29
Payer: COMMERCIAL

## 2025-09-03 ENCOUNTER — APPOINTMENT (OUTPATIENT)
Dept: MEDICAL GROUP | Facility: MEDICAL CENTER | Age: 70
End: 2025-09-03
Payer: MEDICARE